# Patient Record
Sex: MALE | Race: BLACK OR AFRICAN AMERICAN | NOT HISPANIC OR LATINO | Employment: PART TIME | URBAN - METROPOLITAN AREA
[De-identification: names, ages, dates, MRNs, and addresses within clinical notes are randomized per-mention and may not be internally consistent; named-entity substitution may affect disease eponyms.]

---

## 2017-04-19 ENCOUNTER — TRANSFERRED RECORDS (OUTPATIENT)
Dept: HEALTH INFORMATION MANAGEMENT | Facility: CLINIC | Age: 47
End: 2017-04-19

## 2017-05-05 ENCOUNTER — TRANSFERRED RECORDS (OUTPATIENT)
Dept: HEALTH INFORMATION MANAGEMENT | Facility: CLINIC | Age: 47
End: 2017-05-05

## 2017-06-12 ENCOUNTER — TRANSFERRED RECORDS (OUTPATIENT)
Dept: HEALTH INFORMATION MANAGEMENT | Facility: CLINIC | Age: 47
End: 2017-06-12

## 2017-06-21 ENCOUNTER — TRANSFERRED RECORDS (OUTPATIENT)
Dept: HEALTH INFORMATION MANAGEMENT | Facility: CLINIC | Age: 47
End: 2017-06-21

## 2017-07-05 ENCOUNTER — TRANSFERRED RECORDS (OUTPATIENT)
Dept: HEALTH INFORMATION MANAGEMENT | Facility: CLINIC | Age: 47
End: 2017-07-05

## 2018-02-19 ENCOUNTER — CHARTING TRANS (OUTPATIENT)
Dept: OTHER | Age: 48
End: 2018-02-19

## 2018-02-19 ASSESSMENT — PAIN SCALES - GENERAL: PAINLEVEL_OUTOF10: 8

## 2018-04-11 ENCOUNTER — CHARTING TRANS (OUTPATIENT)
Dept: OTHER | Age: 48
End: 2018-04-11

## 2018-04-19 ENCOUNTER — IMAGING SERVICES (OUTPATIENT)
Dept: OTHER | Age: 48
End: 2018-04-19

## 2018-04-19 ENCOUNTER — LAB SERVICES (OUTPATIENT)
Dept: OTHER | Age: 48
End: 2018-04-19

## 2018-04-19 ENCOUNTER — CHARTING TRANS (OUTPATIENT)
Dept: OTHER | Age: 48
End: 2018-04-19

## 2018-04-19 LAB
ANALYZER ANC (IANC): ABNORMAL
ANALYZER ANC (IANC): ABNORMAL
ANION GAP SERPL CALC-SCNC: 14 MMOL/L (ref 10–20)
ANION GAP SERPL CALC-SCNC: 14 MMOL/L (ref 10–20)
BASOPHILS # BLD: 0.1 K/MCL (ref 0–0.3)
BASOPHILS # BLD: 0.1 THOUSAND/MCL (ref 0–0.3)
BASOPHILS NFR BLD: 0 %
BASOPHILS NFR BLD: 0 %
BUN SERPL-MCNC: 20 MG/DL (ref 6–20)
BUN SERPL-MCNC: 20 MG/DL (ref 6–20)
BUN/CREAT SERPL: 17 (ref 7–25)
BUN/CREAT SERPL: 17 (ref 7–25)
CALCIUM SERPL-MCNC: 9.6 MG/DL (ref 8.4–10.2)
CALCIUM SERPL-MCNC: 9.6 MG/DL (ref 8.4–10.2)
CHLORIDE SERPL-SCNC: 106 MMOL/L (ref 98–107)
CHLORIDE: 106 MMOL/L (ref 98–107)
CO2 SERPL-SCNC: 27 MMOL/L (ref 21–32)
CO2 SERPL-SCNC: 27 MMOL/L (ref 21–32)
CREAT SERPL-MCNC: 1.18 MG/DL (ref 0.67–1.17)
CREAT SERPL-MCNC: 1.18 MG/DL (ref 0.67–1.17)
D DIMER PPP FEU-MCNC: <0.19 MG/L (FEU)
D DIMER PPP FEU-MCNC: <0.19 MG/L FEU
DIFFERENTIAL METHOD BLD: ABNORMAL
DIFFERENTIAL METHOD BLD: ABNORMAL
EOSINOPHIL # BLD: 0.3 K/MCL (ref 0.1–0.5)
EOSINOPHIL # BLD: 0.3 THOUSAND/MCL (ref 0.1–0.5)
EOSINOPHIL NFR BLD: 2 %
EOSINOPHIL NFR BLD: 2 %
ERYTHROCYTE [DISTWIDTH] IN BLOOD: 14.6 % (ref 11–15)
ERYTHROCYTE [DISTWIDTH] IN BLOOD: 14.6 % (ref 11–15)
GLUCOSE SERPL-MCNC: 94 MG/DL (ref 65–99)
GLUCOSE SERPL-MCNC: 94 MG/DL (ref 65–99)
HEMATOCRIT: 49.3 % (ref 39–51)
HEMATOCRIT: 49.3 % (ref 39–51)
HEMOGLOBIN: 16.2 G/DL (ref 13–17)
HGB BLD-MCNC: 16.2 GM/DL (ref 13–17)
LYMPHOCYTES # BLD: 3.1 K/MCL (ref 1–4.8)
LYMPHOCYTES # BLD: 3.1 THOUSAND/MCL (ref 1–4.8)
LYMPHOCYTES NFR BLD: 23 %
LYMPHOCYTES NFR BLD: 23 %
MCH RBC QN AUTO: 28.4 PG (ref 26–34)
MCHC RBC AUTO-ENTMCNC: 32.9 GM/DL (ref 32–36.5)
MCV RBC AUTO: 86.3 FL (ref 78–100)
MEAN CORPUSCULAR HEMOGLOBIN: 28.4 PG (ref 26–34)
MEAN CORPUSCULAR HGB CONC: 32.9 G/DL (ref 32–36.5)
MEAN CORPUSCULAR VOLUME: 86.3 FL (ref 78–100)
MONOCYTES # BLD: 1.1 K/MCL (ref 0.3–0.9)
MONOCYTES # BLD: 1.1 THOUSAND/MCL (ref 0.3–0.9)
MONOCYTES NFR BLD: 8 %
MONOCYTES NFR BLD: 8 %
NEUTROPHILS # BLD: 9 K/MCL (ref 1.8–7.7)
NEUTROPHILS # BLD: 9 THOUSAND/MCL (ref 1.8–7.7)
NEUTROPHILS NFR BLD: 67 %
NEUTROPHILS NFR BLD: 67 %
NEUTS SEG NFR BLD: ABNORMAL
NEUTS SEG NFR BLD: ABNORMAL %
NRBC (NRBCRE): ABNORMAL
PERCENT NRBC: ABNORMAL
PLATELET # BLD: 295 THOUSAND/MCL (ref 140–450)
PLATELET COUNT: 295 K/MCL (ref 140–450)
POTASSIUM SERPL-SCNC: 4.7 MMOL/L (ref 3.4–5.1)
POTASSIUM SERPL-SCNC: 4.7 MMOL/L (ref 3.4–5.1)
RBC # BLD: 5.71 MILLION/MCL (ref 4.5–5.9)
RED CELL COUNT: 5.71 MIL/MCL (ref 4.5–5.9)
SODIUM SERPL-SCNC: 142 MMOL/L (ref 135–145)
SODIUM SERPL-SCNC: 142 MMOL/L (ref 135–145)
TROPONIN I SERPL HS-MCNC: <0.02 NG/ML
WBC # BLD: 13.5 THOUSAND/MCL (ref 4.2–11)
WHITE BLOOD COUNT: 13.5 K/MCL (ref 4.2–11)

## 2018-04-20 ENCOUNTER — HOSPITAL (OUTPATIENT)
Dept: OTHER | Age: 48
End: 2018-04-20
Attending: INTERNAL MEDICINE

## 2018-04-20 ENCOUNTER — IMAGING SERVICES (OUTPATIENT)
Dept: OTHER | Age: 48
End: 2018-04-20

## 2018-04-20 ENCOUNTER — DIAGNOSTIC TRANS (OUTPATIENT)
Dept: OTHER | Age: 48
End: 2018-04-20

## 2018-04-20 LAB
ALBUMIN SERPL-MCNC: 3.6 GM/DL (ref 3.6–5.1)
ALBUMIN/GLOB SERPL: 1 {RATIO} (ref 1–2.4)
ALP SERPL-CCNC: 60 UNIT/L (ref 45–117)
ALT SERPL-CCNC: 35 UNIT/L
ANALYZER ANC (IANC): ABNORMAL
ANION GAP SERPL CALC-SCNC: 16 MMOL/L (ref 10–20)
AST SERPL-CCNC: 13 UNIT/L
BASOPHILS # BLD: 0 THOUSAND/MCL (ref 0–0.3)
BASOPHILS NFR BLD: 0 %
BILIRUB SERPL-MCNC: 0.2 MG/DL (ref 0.2–1)
BUN SERPL-MCNC: 20 MG/DL (ref 6–20)
BUN/CREAT SERPL: 19 (ref 7–25)
CALCIUM SERPL-MCNC: 9.1 MG/DL (ref 8.4–10.2)
CHLORIDE: 105 MMOL/L (ref 98–107)
CO2 SERPL-SCNC: 24 MMOL/L (ref 21–32)
CREAT SERPL-MCNC: 1.03 MG/DL (ref 0.67–1.17)
DIFFERENTIAL METHOD BLD: ABNORMAL
EOSINOPHIL # BLD: 0 THOUSAND/MCL (ref 0.1–0.5)
EOSINOPHIL NFR BLD: 0 %
ERYTHROCYTE [DISTWIDTH] IN BLOOD: 14.7 % (ref 11–15)
GLOBULIN SER-MCNC: 3.5 GM/DL (ref 2–4)
GLUCOSE SERPL-MCNC: 221 MG/DL (ref 65–99)
HEMATOCRIT: 47 % (ref 39–51)
HGB BLD-MCNC: 15.3 GM/DL (ref 13–17)
LYMPHOCYTES # BLD: 0.7 THOUSAND/MCL (ref 1–4.8)
LYMPHOCYTES NFR BLD: 6 %
MCH RBC QN AUTO: 28 PG (ref 26–34)
MCHC RBC AUTO-ENTMCNC: 32.6 GM/DL (ref 32–36.5)
MCV RBC AUTO: 85.9 FL (ref 78–100)
MONOCYTES # BLD: 0.2 THOUSAND/MCL (ref 0.3–0.9)
MONOCYTES NFR BLD: 2 %
NEUTROPHILS # BLD: 10.2 THOUSAND/MCL (ref 1.8–7.7)
NEUTROPHILS NFR BLD: 92 %
NEUTS SEG NFR BLD: ABNORMAL %
PERCENT NRBC: ABNORMAL
PLATELET # BLD: 282 THOUSAND/MCL (ref 140–450)
POTASSIUM SERPL-SCNC: 4.2 MMOL/L (ref 3.4–5.1)
PROT SERPL-MCNC: 7.1 GM/DL (ref 6.4–8.2)
RBC # BLD: 5.47 MILLION/MCL (ref 4.5–5.9)
SODIUM SERPL-SCNC: 141 MMOL/L (ref 135–145)
TROPONIN I SERPL HS-MCNC: <0.02 NG/ML
WBC # BLD: 11.1 THOUSAND/MCL (ref 4.2–11)

## 2018-04-21 LAB
ANALYZER ANC (IANC): ABNORMAL
ANION GAP SERPL CALC-SCNC: 14 MMOL/L (ref 10–20)
BASOPHILS # BLD: 0 THOUSAND/MCL (ref 0–0.3)
BASOPHILS NFR BLD: 0 %
BUN SERPL-MCNC: 18 MG/DL (ref 6–20)
BUN/CREAT SERPL: 19 (ref 7–25)
CALCIUM SERPL-MCNC: 8.8 MG/DL (ref 8.4–10.2)
CHLORIDE: 106 MMOL/L (ref 98–107)
CO2 SERPL-SCNC: 24 MMOL/L (ref 21–32)
CREAT SERPL-MCNC: 0.95 MG/DL (ref 0.67–1.17)
DIFFERENTIAL METHOD BLD: ABNORMAL
EOSINOPHIL # BLD: 0 THOUSAND/MCL (ref 0.1–0.5)
EOSINOPHIL NFR BLD: 0 %
ERYTHROCYTE [DISTWIDTH] IN BLOOD: 14.7 % (ref 11–15)
GLUCOSE SERPL-MCNC: 288 MG/DL (ref 65–99)
HEMATOCRIT: 45.3 % (ref 39–51)
HGB BLD-MCNC: 15.1 GM/DL (ref 13–17)
LYMPHOCYTES # BLD: 0.8 THOUSAND/MCL (ref 1–4.8)
LYMPHOCYTES NFR BLD: 4 %
MCH RBC QN AUTO: 28.4 PG (ref 26–34)
MCHC RBC AUTO-ENTMCNC: 33.3 GM/DL (ref 32–36.5)
MCV RBC AUTO: 85.3 FL (ref 78–100)
MONOCYTES # BLD: 0.7 THOUSAND/MCL (ref 0.3–0.9)
MONOCYTES NFR BLD: 4 %
NEUTROPHILS # BLD: 17.8 THOUSAND/MCL (ref 1.8–7.7)
NEUTROPHILS NFR BLD: 92 %
NEUTS SEG NFR BLD: ABNORMAL %
PERCENT NRBC: ABNORMAL
PLATELET # BLD: 294 THOUSAND/MCL (ref 140–450)
POTASSIUM SERPL-SCNC: 4.3 MMOL/L (ref 3.4–5.1)
RBC # BLD: 5.31 MILLION/MCL (ref 4.5–5.9)
SODIUM SERPL-SCNC: 140 MMOL/L (ref 135–145)
WBC # BLD: 19.2 THOUSAND/MCL (ref 4.2–11)

## 2018-04-22 LAB
ANION GAP SERPL CALC-SCNC: 12 MMOL/L (ref 10–20)
BUN SERPL-MCNC: 17 MG/DL (ref 6–20)
BUN/CREAT SERPL: 19 (ref 7–25)
CALCIUM SERPL-MCNC: 8.9 MG/DL (ref 8.4–10.2)
CHLORIDE: 108 MMOL/L (ref 98–107)
CO2 SERPL-SCNC: 26 MMOL/L (ref 21–32)
CREAT SERPL-MCNC: 0.88 MG/DL (ref 0.67–1.17)
GLUCOSE BLDC GLUCOMTR-MCNC: 108 MG/DL (ref 65–99)
GLUCOSE BLDC GLUCOMTR-MCNC: 141 MG/DL (ref 65–99)
GLUCOSE BLDC GLUCOMTR-MCNC: 213 MG/DL (ref 65–99)
GLUCOSE SERPL-MCNC: 158 MG/DL (ref 65–99)
POTASSIUM SERPL-SCNC: 4.4 MMOL/L (ref 3.4–5.1)
SODIUM SERPL-SCNC: 142 MMOL/L (ref 135–145)

## 2018-05-24 ENCOUNTER — CHARTING TRANS (OUTPATIENT)
Dept: OTHER | Age: 48
End: 2018-05-24

## 2018-05-24 ASSESSMENT — PAIN SCALES - GENERAL: PAINLEVEL_OUTOF10: 7

## 2018-06-19 ENCOUNTER — CHARTING TRANS (OUTPATIENT)
Dept: OTHER | Age: 48
End: 2018-06-19

## 2018-06-19 ASSESSMENT — PAIN SCALES - GENERAL: PAINLEVEL_OUTOF10: 7

## 2018-07-31 ENCOUNTER — CHARTING TRANS (OUTPATIENT)
Dept: OTHER | Age: 48
End: 2018-07-31

## 2018-11-01 VITALS
BODY MASS INDEX: 41.04 KG/M2 | SYSTOLIC BLOOD PRESSURE: 126 MMHG | RESPIRATION RATE: 16 BRPM | WEIGHT: 261.46 LBS | TEMPERATURE: 97.6 F | HEART RATE: 66 BPM | DIASTOLIC BLOOD PRESSURE: 72 MMHG | HEIGHT: 67 IN | OXYGEN SATURATION: 96 %

## 2018-11-01 VITALS
TEMPERATURE: 98.1 F | DIASTOLIC BLOOD PRESSURE: 60 MMHG | SYSTOLIC BLOOD PRESSURE: 116 MMHG | WEIGHT: 259.48 LBS | HEART RATE: 70 BPM | RESPIRATION RATE: 16 BRPM | BODY MASS INDEX: 40.73 KG/M2 | HEIGHT: 67 IN

## 2018-11-01 VITALS
DIASTOLIC BLOOD PRESSURE: 64 MMHG | BODY MASS INDEX: 40.59 KG/M2 | SYSTOLIC BLOOD PRESSURE: 118 MMHG | TEMPERATURE: 97.8 F | RESPIRATION RATE: 16 BRPM | HEIGHT: 67 IN | HEART RATE: 74 BPM | WEIGHT: 258.6 LBS

## 2019-05-09 ENCOUNTER — TRANSFERRED RECORDS (OUTPATIENT)
Dept: HEALTH INFORMATION MANAGEMENT | Facility: CLINIC | Age: 49
End: 2019-05-09

## 2019-05-30 ENCOUNTER — DOCUMENTATION ONLY (OUTPATIENT)
Dept: CARE COORDINATION | Facility: CLINIC | Age: 49
End: 2019-05-30

## 2019-06-05 ENCOUNTER — PATIENT OUTREACH (OUTPATIENT)
Dept: CARE COORDINATION | Facility: CLINIC | Age: 49
End: 2019-06-05

## 2019-06-05 ENCOUNTER — OFFICE VISIT (OUTPATIENT)
Dept: INTERNAL MEDICINE | Facility: CLINIC | Age: 49
End: 2019-06-05

## 2019-06-05 VITALS
BODY MASS INDEX: 44.25 KG/M2 | SYSTOLIC BLOOD PRESSURE: 112 MMHG | OXYGEN SATURATION: 96 % | WEIGHT: 274 LBS | DIASTOLIC BLOOD PRESSURE: 77 MMHG | HEART RATE: 65 BPM

## 2019-06-05 DIAGNOSIS — M54.41 CHRONIC BILATERAL LOW BACK PAIN WITH RIGHT-SIDED SCIATICA: Primary | ICD-10-CM

## 2019-06-05 DIAGNOSIS — G89.29 CHRONIC BILATERAL LOW BACK PAIN WITH RIGHT-SIDED SCIATICA: Primary | ICD-10-CM

## 2019-06-05 DIAGNOSIS — M54.2 NECK PAIN: ICD-10-CM

## 2019-06-05 DIAGNOSIS — J41.0 SIMPLE CHRONIC BRONCHITIS (H): ICD-10-CM

## 2019-06-05 RX ORDER — ALBUTEROL SULFATE 90 UG/1
2 AEROSOL, METERED RESPIRATORY (INHALATION) EVERY 6 HOURS PRN
Qty: 8.5 G | Refills: 3 | Status: SHIPPED | OUTPATIENT
Start: 2019-06-05 | End: 2021-07-09

## 2019-06-05 RX ORDER — NAPROXEN 500 MG/1
500 TABLET ORAL 2 TIMES DAILY PRN
Qty: 60 TABLET | Refills: 1 | Status: SHIPPED | OUTPATIENT
Start: 2019-06-05 | End: 2020-03-02

## 2019-06-05 RX ORDER — ONDANSETRON 4 MG/1
TABLET, ORALLY DISINTEGRATING ORAL
COMMUNITY
Start: 2019-05-09 | End: 2019-11-20

## 2019-06-05 RX ORDER — ALBUTEROL SULFATE 1.25 MG/3ML
1.25 SOLUTION RESPIRATORY (INHALATION) EVERY 6 HOURS PRN
COMMUNITY
End: 2019-11-08

## 2019-06-05 RX ORDER — ALBUTEROL SULFATE 90 UG/1
2 AEROSOL, METERED RESPIRATORY (INHALATION)
COMMUNITY
Start: 2019-04-04 | End: 2019-06-05

## 2019-06-05 RX ORDER — ACETAMINOPHEN 500 MG
500-1000 TABLET ORAL EVERY 6 HOURS PRN
Qty: 90 TABLET | Refills: 1 | Status: SHIPPED | OUTPATIENT
Start: 2019-06-05 | End: 2020-03-17

## 2019-06-05 RX ORDER — FAMOTIDINE 20 MG/1
20 TABLET, FILM COATED ORAL
COMMUNITY
Start: 2019-05-09 | End: 2019-11-20

## 2019-06-05 ASSESSMENT — PAIN SCALES - GENERAL: PAINLEVEL: SEVERE PAIN (6)

## 2019-06-05 NOTE — PROGRESS NOTES
[facility]  Primary Care Center   JUNIOR Cabello CNP  06/05/2019      Chief Complaint:   No chief complaint on file.     History of Present Illness:   Bill Wisdom is a 49 year old male with a history of esophageal reflux who presents for establishment of primary care.    Other concerns discussed:  ***     Review of Systems:   Pertinent items are noted in HPI, remainder of complete ROS is negative.      Active Medications:     Current Outpatient Medications:      CRANBERRY, , Disp: , Rfl:      FISH OIL, , Disp: , Rfl:      LANsoprazole (PREVACID) 15 MG capsule, Take 1 capsule by mouth daily., Disp: 30 capsule, Rfl: 3     MILK THISTLE, , Disp: , Rfl:       Allergies:   Patient has no known allergies.      Past Medical History:  Low back pain  Spermatocele  Obesity  Esophageal reflux  Family history of diabetes     Past Surgical History:  Scrotal hydrocelectomy  Spermatoceletomy    Family History:   Mother - coronary artery disease with triple bypass  Father - hypertension, cerebrovascular disease  Sister - alcohol/drug abuse     Social History:   Presents to clinic ***  Tobacco Use: Former smoker only while drinking  Alcohol Use: No alcohol use.   PCP: Physician No Ref-Primary      Physical Exam:   There were no vitals taken for this visit.   ***      Assessment and Plan:  There are no diagnoses linked to this encounter.     Follow-up: No follow-ups on file.         Scribe Disclosure:  Mae LEIGH, am serving as a scribe to document services personally performed by JUNIOR Cabello CNP at this visit, based upon the provider's statements to me. All documentation has been reviewed by the aforementioned provider prior to being entered into the official medical record.    Scribe Preparation Attestation:  Mae LEIGH, a scribe, prepared the chart for today's encounter. ***     Portions of this medical record were completed by a scribe. UPON MY REVIEW AND AUTHENTICATION BY ELECTRONIC  SIGNATURE, this confirms (a) I performed the applicable clinical services, and (b) the record is accurate.

## 2019-06-05 NOTE — NURSING NOTE
Chief Complaint   Patient presents with     Establish Care     Pt is here establish a new PCP.      Dolly Corrigan LPN at 11:29 AM on 6/5/2019.

## 2019-06-05 NOTE — PROGRESS NOTES
"[facility]  Primary Care [place]   Amalia CORRALBro Hernandez, JUNIOR ROJAS  06/05/2019      Chief Complaint: Establish Care     History of Present Illness:   Bill Wisdom is a 49 year old male with a history of CHANDLER on CPAP and COPD who presents to hospitals with primary care. He is currently living at the [facility]. He is working with [employer] and the shelter regarding finding new housing, and has a form for work restrictions. He moved here from [place] and was previously being seen at the [facility].     COPD:  The patient endorses a history of COPD for which he has used nebs in the past. He does not currently have an inhaler. He reports intermittent wheezing and coughing, especially at night. He is not currently using his nebs or CPAP due to not wanting to keep his machines at the shelter, so his nebulizer equipment and CPAP machine are at his daughter's house. He denies current shortness of breath. He reports he is a former smoker.    Back pain:  The patient reports a history of neck pain radiating down his right arm. He also has lower back pain with occasional right-sided sciatica. He states his low back pain is worse than his neck. He requests to be connected with a pain specialist for continued C5-C6 and lower back injections. He requests Ibuprofen for pain, as this has been helpful in the past. He reports another pain medication that has been helpful his old doctor called a \"fancy Ibuprofen.\" He states Naproxen sounds right. His pain is exacerbated by sitting or standing for too long. He reports having completed some physical therapy in the past.     Spermatocele/hydrocele:  The patient reports a past surgery back in 2012 for a spermatocele/hydrocele. He reports that this has recurred in his left testicle. He describes this as fullness that is pushing on one side and it is uncomfortable. He is urinating and having bowel movements without difficulty.     Mental health:  The patient reports " intermittent depression, hopelessness, and anxiety, especially regarding his current living situation. He is working with [facility]. He denies known history of bipolar or schizophrenia.     Other concerns discussed:   The patient is not currently working. He worked in construction in the past. He notes being overweight and a prediabetic. He is trying to avoid fried foods. He plans to get a weight loss plan put together once he gets settled.      Review of Systems:   Pertinent items are noted in HPI, remainder of complete ROS is negative.      Active Medications:      albuterol (ACCUNEB) 1.25 MG/3ML neb solution, Take 1.25 mg by nebulization every 6 hours as needed for shortness of breath / dyspnea or wheezing, Disp: , Rfl:      albuterol (PROVENTIL HFA) 108 (90 Base) MCG/ACT inhaler, Inhale 2 puffs into the lungs, Disp: , Rfl:      CRANBERRY, , Disp: , Rfl:      famotidine (PEPCID) 20 MG tablet, Take 20 mg by mouth, Disp: , Rfl:      FISH OIL, , Disp: , Rfl:      MILK THISTLE, , Disp: , Rfl:      ondansetron (ZOFRAN ODT) 4 MG ODT tab, 1-2 tab dissolve on tongue 4 times daily as needed for nausea, max daily dose 4 tab, Disp: , Rfl:       Allergies:   Patient has no known allergies.      Past Medical History:  Family history of arteriosclerotic cardiovascular disease  Family history of diabetes mellitus  Low back pain  Spermatocele  Obesity  Esophageal reflux     Past Surgical History:  Hydrocelectomy scrotal  Spermatocelectomy    Family History:   Mother - coronary artery disease, triple bypass  Father - hypertension, cerebrovascular disease  Sister - alcohol/drug disease     Social History:   Presents to clinic alone.   Tobacco Use: Former smoker, quit 90 days ago  Alcohol Use: No alcohol use. Quit 90 days ago.   The patient recently moved back to [place] from [place]. The patient has 4 daughter and a son. Three of his daughters live in the [facility] [place] area. One daughter and son live in [place]. He  has 8 grandchildren. He is currently unemployed.      Physical Exam:   /77   Pulse 65   Wt 124.3 kg (274 lb)   SpO2 96%   BMI 44.25 kg/m       Constitutional: Alert, oriented, pleasant, no acute distress  Head: Normocephalic, atraumatic  Eyes: Extra-ocular movements intact, pupils equally round and reactive bilaterally, no scleral icterus  ENT: Oropharynx clear, moist mucus membranes, good dentition  Neck: Supple, no lymphadenopathy, no thyromegaly  Cardiovascular: Regular rate and rhythm, no murmurs, rubs or gallops  Respiratory: Good air movement bilaterally, lungs clear, no wheezes/rales/rhonchi  GI: Abdomen soft, bowel sounds present, nondistended, nontender, no organomegaly or masses, no rebound/guarding  Musculoskeletal: No edema, normal muscle tone, normal gait, tenderness over C7-T1 and lumbar spine, tender right lumbar paraspinal muscles, pain elicited wit spinal rotation to the left  Neurologic: Alert and oriented, cranial nerves grossly intact, strength 5/5 throughout, sensation to light touch intact, reflexes 2+ bilaterally  Psychiatric: normal mentation, affect and mood      Assessment and Plan:  Simple chronic bronchitis - Normal breathing on exam. Endorses history of COPD and CHANDLER. Not currently using neb or CPAP due to fear of having his machines in the shelter. He is working with housing assistance to find a place of his own. Refilled albuterol inhaler in the meantime, encouraged him to keep this in his pocket/with personal belongings to avoid having it stolen.    - albuterol (PROVENTIL HFA) 108 (90 Base) MCG/ACT inhaler  Dispense: 8.5 g; Refill: 3    Neck pain - Chronic. Reports he has had neck and low back injections in the past. Requests to see the pain clinic for further pain management. Started on Tylenol and Naproxen prn. Will request records for prior imaging studies.  - [facility] REFERRAL  - acetaminophen (TYLENOL) 500 MG tablet  Dispense: 90 tablet;  Refill: 1  - naproxen (NAPROSYN) 500 MG tablet  Dispense: 60 tablet; Refill: 1    Chronic bilateral low back pain with right-sided sciatica - See above.   - [facility] REFERRAL  - acetaminophen (TYLENOL) 500 MG tablet  Dispense: 90 tablet; Refill: 1  - naproxen (NAPROSYN) 500 MG tablet  Dispense: 60 tablet; Refill: 1    Follow-up: Return in about 1 month (around 7/3/2019). Discussed can review his form at that time.        Scribe Disclosure:  I, Mae Garcia, am serving as a scribe to document services personally performed by JUNIOR Cabello CNP at this visit, based upon the provider's statements to me. All documentation has been reviewed by the aforementioned provider prior to being entered into the official medical record.    Portions of this medical record were completed by a scribe. UPON MY REVIEW AND AUTHENTICATION BY ELECTRONIC SIGNATURE, this confirms (a) I performed the applicable clinical services, and (b) the record is accurate.     JUNIOR Cabello CNP

## 2019-06-05 NOTE — PATIENT INSTRUCTIONS
[facility] Medication Refill Request Information:  * Please contact your pharmacy regarding ANY request for medication refills.  ** [facility] Prescription Fax = [phone]  * Please allow 3 business days for routine medication refills.  * Please allow 5 business days for controlled substance medication refills.     [facility] Test Result notification information:  *You will be notified with in 7-10 days of your appointment day regarding the results of your test.  If you are on MyChart you will be notified as soon as the provider has reviewed the results and signed off on them.    [facility]: [phone]

## 2019-06-10 ENCOUNTER — MEDICAL CORRESPONDENCE (OUTPATIENT)
Dept: HEALTH INFORMATION MANAGEMENT | Facility: CLINIC | Age: 49
End: 2019-06-10

## 2019-06-12 ENCOUNTER — TELEPHONE (OUTPATIENT)
Dept: INTERNAL MEDICINE | Facility: CLINIC | Age: 49
End: 2019-06-12

## 2019-06-12 NOTE — TELEPHONE ENCOUNTER
[facility] Call Center    Phone Message    May a detailed message be left on voicemail: yes    Reason for Call: pt needs name and number of the  so that he can get give that name to his case management for [employer] So that they can all work together to help the pt. Pt saw Amalia on June 5, 2019.  Action Taken: Message routed to:  [facility] ([facility] primary care

## 2019-06-12 NOTE — TELEPHONE ENCOUNTER
Pt looking for social workers name and number he spoke with at visit. I informed pt of the clinic social workers contact information via a voicemail [phone] per their request. Miroslava Love CMA at 10:38 AM on 6/12/2019

## 2019-06-13 DIAGNOSIS — M54.2 NECK PAIN: ICD-10-CM

## 2019-06-13 DIAGNOSIS — G89.29 CHRONIC BILATERAL LOW BACK PAIN WITH RIGHT-SIDED SCIATICA: ICD-10-CM

## 2019-06-13 DIAGNOSIS — M54.41 CHRONIC BILATERAL LOW BACK PAIN WITH RIGHT-SIDED SCIATICA: ICD-10-CM

## 2019-06-13 NOTE — PROGRESS NOTES
Records from [facility] received and reviewed. Derek is prescribed Trazodone 100-200 mg at bedtime prn, Fluoxetine 20 mg daily, and Lorazepam 0.5 mg BID prn. All were prescribed on 5/22/19.

## 2019-06-26 NOTE — PROGRESS NOTES
Social Work Intervention  [facility]    Data/Intervention:    Patient Name:  Bill Wisdom  /Age:  1970 (49 year old)    Visit Type: in person  Referral Source: PCC  Reason for Referral:  Housing resources    Collaborated With:    -Patient    Patient Concerns/Issues:   Patient reported he is currently homeless and stays at [facility] in [facility]. Patient reported he currently has an income through [employer]. Patient reported he has had intakes at [employer], [employer], [employer], and [facility].     Intervention/Education/Resources Provided:   provided supportive listening and validation.  encouraged Patient to continue to work with the programs he has done intakes with.  also showed Patient, on the computer, how to find openings using [employer]. Patient asked  to email him the link for [employer] to [email or web address].  sent email.    Assessment/Plan:  Patient to continue to pursue housing through agencies he has already completed intakes with.  emailed [employer] information to Patient to use at his leisure.    Provided patient/family with contact information and availability.    Mae Bravo [facility]  Outpatient Specialty Clinics  Direct Phone: [phone]  Pager:  [phone]

## 2019-07-22 ENCOUNTER — HOSPITAL ENCOUNTER (EMERGENCY)
Facility: CLINIC | Age: 49
Discharge: HOME | End: 2019-07-22
Attending: FAMILY MEDICINE | Admitting: FAMILY MEDICINE
Payer: COMMERCIAL

## 2019-07-22 ENCOUNTER — APPOINTMENT (OUTPATIENT)
Dept: GENERAL RADIOLOGY | Facility: CLINIC | Age: 49
End: 2019-07-22
Attending: FAMILY MEDICINE
Payer: COMMERCIAL

## 2019-07-22 VITALS
RESPIRATION RATE: 16 BRPM | TEMPERATURE: 97.9 F | DIASTOLIC BLOOD PRESSURE: 77 MMHG | SYSTOLIC BLOOD PRESSURE: 121 MMHG | HEART RATE: 63 BPM | OXYGEN SATURATION: 100 %

## 2019-07-22 DIAGNOSIS — M10.071 ACUTE IDIOPATHIC GOUT INVOLVING TOE OF RIGHT FOOT: ICD-10-CM

## 2019-07-22 LAB
ANION GAP SERPL CALCULATED.3IONS-SCNC: 5 MMOL/L (ref 3–14)
BASOPHILS # BLD AUTO: 0 10E9/L (ref 0–0.2)
BASOPHILS NFR BLD AUTO: 0.2 %
BUN SERPL-MCNC: 12 MG/DL (ref 7–30)
CALCIUM SERPL-MCNC: 8.9 MG/DL (ref 8.5–10.1)
CHLORIDE SERPL-SCNC: 110 MMOL/L (ref 94–109)
CO2 SERPL-SCNC: 29 MMOL/L (ref 20–32)
CREAT SERPL-MCNC: 0.85 MG/DL (ref 0.66–1.25)
DIFFERENTIAL METHOD BLD: ABNORMAL
EOSINOPHIL # BLD AUTO: 0.4 10E9/L (ref 0–0.7)
EOSINOPHIL NFR BLD AUTO: 3.2 %
ERYTHROCYTE [DISTWIDTH] IN BLOOD BY AUTOMATED COUNT: 14 % (ref 10–15)
GFR SERPL CREATININE-BSD FRML MDRD: >90 ML/MIN/{1.73_M2}
GLUCOSE SERPL-MCNC: 88 MG/DL (ref 70–99)
HCT VFR BLD AUTO: 42.9 % (ref 40–53)
HGB BLD-MCNC: 13.6 G/DL (ref 13.3–17.7)
IMM GRANULOCYTES # BLD: 0.1 10E9/L (ref 0–0.4)
IMM GRANULOCYTES NFR BLD: 0.4 %
LYMPHOCYTES # BLD AUTO: 2.1 10E9/L (ref 0.8–5.3)
LYMPHOCYTES NFR BLD AUTO: 18.6 %
MCH RBC QN AUTO: 27.2 PG (ref 26.5–33)
MCHC RBC AUTO-ENTMCNC: 31.7 G/DL (ref 31.5–36.5)
MCV RBC AUTO: 86 FL (ref 78–100)
MONOCYTES # BLD AUTO: 1.1 10E9/L (ref 0–1.3)
MONOCYTES NFR BLD AUTO: 10 %
NEUTROPHILS # BLD AUTO: 7.6 10E9/L (ref 1.6–8.3)
NEUTROPHILS NFR BLD AUTO: 67.6 %
NRBC # BLD AUTO: 0 10*3/UL
NRBC BLD AUTO-RTO: 0 /100
PLATELET # BLD AUTO: 250 10E9/L (ref 150–450)
POTASSIUM SERPL-SCNC: 4.1 MMOL/L (ref 3.4–5.3)
RBC # BLD AUTO: 5 10E12/L (ref 4.4–5.9)
SODIUM SERPL-SCNC: 144 MMOL/L (ref 133–144)
URATE SERPL-MCNC: 7.5 MG/DL (ref 3.5–7.2)
WBC # BLD AUTO: 11.2 10E9/L (ref 4–11)

## 2019-07-22 PROCEDURE — 80048 BASIC METABOLIC PNL TOTAL CA: CPT | Performed by: FAMILY MEDICINE

## 2019-07-22 PROCEDURE — 73660 X-RAY EXAM OF TOE(S): CPT | Mod: RT

## 2019-07-22 PROCEDURE — 84550 ASSAY OF BLOOD/URIC ACID: CPT | Performed by: FAMILY MEDICINE

## 2019-07-22 PROCEDURE — 99284 EMERGENCY DEPT VISIT MOD MDM: CPT | Mod: Z6 | Performed by: FAMILY MEDICINE

## 2019-07-22 PROCEDURE — 85025 COMPLETE CBC W/AUTO DIFF WBC: CPT | Performed by: FAMILY MEDICINE

## 2019-07-22 PROCEDURE — 99284 EMERGENCY DEPT VISIT MOD MDM: CPT | Performed by: FAMILY MEDICINE

## 2019-07-22 PROCEDURE — 25000132 ZZH RX MED GY IP 250 OP 250 PS 637: Performed by: FAMILY MEDICINE

## 2019-07-22 PROCEDURE — 36415 COLL VENOUS BLD VENIPUNCTURE: CPT | Performed by: FAMILY MEDICINE

## 2019-07-22 RX ORDER — IBUPROFEN 600 MG/1
600 TABLET, FILM COATED ORAL ONCE
Status: COMPLETED | OUTPATIENT
Start: 2019-07-22 | End: 2019-07-22

## 2019-07-22 RX ORDER — INDOMETHACIN 50 MG/1
50 CAPSULE ORAL 3 TIMES DAILY
Qty: 20 CAPSULE | Refills: 0 | Status: SHIPPED | OUTPATIENT
Start: 2019-07-22 | End: 2019-09-16

## 2019-07-22 RX ADMIN — IBUPROFEN 600 MG: 600 TABLET ORAL at 14:11

## 2019-07-22 ASSESSMENT — ENCOUNTER SYMPTOMS
BACK PAIN: 1
ABDOMINAL PAIN: 0
SHORTNESS OF BREATH: 0
FEVER: 0

## 2019-07-22 NOTE — ED PROVIDER NOTES
"    [facility] EMERGENCY DEPARTMENT ([facility])    7/22/19       History     Chief Complaint   Patient presents with     Toe Pain     R great toe pain and swelling for past few weeks, feels like toe is \"turning dark\"     The history is provided by the patient.     Bill Wisdom is a 49 year old male with a medical history significant for COPD and CHANDLER (on CPAP) who presents to the Emergency Department for evaluation of right great toe pain and swelling.  The patient reports that he first began having pain a week ago.  He has tried different kinds of socks and even bought new shoes, but he has not had improvement of the pain.  He reports that the pain starts in his right great toe and does radiate into the foot and into the ankle.  He has began noticing swelling in the right great toe as well.  He does not believe that he has had any traumas or injuries to the toe.  He does report that he is doing work with [employer] and he is on his feet a lot of the day.  The patient denies any alcohol use.  He denies any history of gout.  The patient does note a history of chronic back pain for which he receives steroid injections, but he denies any change in this back pain and denies any new back pain today.    I have reviewed the Medications, Allergies, Past Medical and Surgical History, and Social History in the [facility] system.    Past Medical History:   Diagnosis Date     COPD (chronic obstructive pulmonary disease) (H)      Gastro-oesophageal reflux disease      History of tobacco abuse      Prediabetes        Past Surgical History:   Procedure Laterality Date     HYDROCELECTOMY SCROTAL  2006     SPERMATOCELECTOMY  3/20/2012    Procedure:SPERMATOCELECTOMY; Left Spermatocelectomy; Surgeon:DELLA CROW; Location:[facility] OR       Family History   Problem Relation Age of Onset     C.A.D. Mother         triple bypass     Hypertension Father      Cerebrovascular Disease Father      Alcohol/Drug Sister        Social History "     Tobacco Use     Smoking status: Former Smoker     Types: Cigarettes     Smokeless tobacco: Never Used   Substance Use Topics     Alcohol use: No       No current facility-administered medications for this encounter.      Current Outpatient Medications   Medication     indomethacin (INDOCIN) 50 MG capsule     acetaminophen (TYLENOL) 500 MG tablet     albuterol (ACCUNEB) 1.25 MG/3ML neb solution     albuterol (PROVENTIL HFA) 108 (90 Base) MCG/ACT inhaler     CRANBERRY     famotidine (PEPCID) 20 MG tablet     FISH OIL     MILK THISTLE     naproxen (NAPROSYN) 500 MG tablet     ondansetron (ZOFRAN ODT) 4 MG ODT tab      No Known Allergies      Review of Systems   Constitutional: Negative for fever.   Respiratory: Negative for shortness of breath.    Cardiovascular: Negative for chest pain.   Gastrointestinal: Negative for abdominal pain.   Musculoskeletal: Positive for back pain (chronic; unchanged).        Positive for right great toe pain with radiation into the foot/ankle  Positive for right great toe swelling   All other systems reviewed and are negative.      Physical Exam   BP: 124/66  Pulse: 61  Temp: 97  F (36.1  C)  Resp: 16  SpO2: 95 %      Physical Exam   Constitutional: No distress.   HENT:   Head: Atraumatic.   Mouth/Throat: Oropharynx is clear and moist.   Eyes: Pupils are equal, round, and reactive to light. No scleral icterus.   Cardiovascular: Normal heart sounds and intact distal pulses.   Pulmonary/Chest: Breath sounds normal. No respiratory distress.   Abdominal: Soft. Bowel sounds are normal. There is no tenderness.   Musculoskeletal: He exhibits no edema.        Right foot: There is decreased range of motion, tenderness and swelling.        Feet:    No evidence of trauma and pedal pulses normal.  Capillary refill normal in the toe.   Skin: Skin is warm. No rash noted. He is not diaphoretic.       ED Course   2:00 PM  The patient was seen and examined by Nathan Valdes MD in Room ED20.         Procedures             Critical Care time:  none             Labs Ordered and Resulted from Time of ED Arrival Up to the Time of Departure from the ED   CBC WITH PLATELETS DIFFERENTIAL - Abnormal; Notable for the following components:       Result Value    WBC 11.2 (*)     All other components within normal limits   BASIC METABOLIC PANEL - Abnormal; Notable for the following components:    Chloride 110 (*)     All other components within normal limits   URIC ACID - Abnormal; Notable for the following components:    Uric Acid 7.5 (*)     All other components within normal limits            Assessments & Plan (with Medical Decision Making)   Patient presented with nontraumatic swelling and pain of the right great toe IP and MTP joints.  Differential diagnosis includes septic joint, gout, pseudogout, autoimmune inflammatory arthritis such as rheumatoid arthritis, hemarthrosis, inflammatory effusion or pain due to osteoarthritis, viral synovitis, bursitis, tendinitis.  On exam vitals normal.  There is warmth and tenderness of the joints but no definite effusion or cellulitis.  There is no sign of septic joint.  Neurovascular examination of the foot normal.  X-ray unremarkable.  Labs reveal elevated uric acid otherwise normal.  Clinically this scenario is most consistent with gout.  Will treat for acute gout with close outpatient follow-up.  We discussed the indications for emergency department return and follow-up.  Stable for discharge.      I have reviewed the nursing notes.    I have reviewed the findings, diagnosis, plan and need for follow up with the patient.       Medication List      Started    indomethacin 50 MG capsule  Commonly known as:  INDOCIN  50 mg, Oral, 3 TIMES DAILY, 1 cap three times daily for 3 days, then 1 cap two times daily for two days, them 1 cap daily for 2 day, then stop            Final diagnoses:   Acute idiopathic gout involving toe of right foot     IJustin, am serving as a  trained medical scribe to document services personally performed by Nathan Valdes MD, based on the provider's statements to me.   I, Nathan Valdes MD, was physically present and have reviewed and verified the accuracy of this note documented by Justin Bullard.    7/22/2019   [facility] [place], EMERGENCY DEPARTMENT     Nathan Valdes MD  07/22/19 [id number]

## 2019-07-22 NOTE — ED AVS SNAPSHOT
[facility], [place], Emergency Department  [address]  [facility] [place] [address]  Phone:  [phone]  Fax:  [phone]                                    Bill Wisdom   MRN: [id number]    Department:  [facility], Emergency Department   Date of Visit:  7/22/2019           After Visit Summary Signature Page    I have received my discharge instructions, and my questions have been answered. I have discussed any challenges I see with this plan with the nurse or doctor.    ..........................................................................................................................................  Patient/Patient Representative Signature      ..........................................................................................................................................  Patient Representative Print Name and Relationship to Patient    ..................................................               ................................................  Date                                   Time    ..........................................................................................................................................  Reviewed by Signature/Title    ...................................................              ..............................................  Date                                               Time          22EPIC Rev 08/18

## 2019-07-22 NOTE — DISCHARGE INSTRUCTIONS
Thank you for choosing [facility].     Please closely monitor for further symptoms. Return to the Emergency Department if you develop any new or worsening signs or symptoms.    If you received any opiate pain medications or sedatives during your visit, please do not drive for at least 8 hours.     Labs, cultures or final xray interpretations may still need to be reviewed.  We will call you if your plan of care needs to be changed.    Please follow up with your primary care physician or clinic in 7 to 10 days for recheck.

## 2019-07-22 NOTE — ED NOTES
Patient alert/oriented, stable on feet. VSS on RA. AVS reviewed, prescription provided for indocin. Patient safe to discharge home.

## 2019-08-05 NOTE — PROGRESS NOTES
I had the pleasure of meeting Mr. Bill Wisdom on 8/6/2019 in [facility] in consult for Dr. Hernandez with regards to his ***  Subjective:  The patient is a 49 year old male with past medical history of GERD, CHANDLER, COPD, chronic neck and back pain who presents for evaluation of ***.  He reports pain in his ***.  The patient's pain began *** following *** and has been *** since that time.  He reports that his pain is located primarily in *** and radiates to the ***.  The patient describes the pain as ***.  He reports that the pain is made worse by ***.  His pain is improved with ***.  In addition, he reports *** associated with the pain.  ***He denies any new problems with falls or balance, any new numbness or weakness of the arms or legs, any new bowel or bladder incontinence, or any sudden or unexpected weight loss.  The patient's pain is most severe during ***.  He rates his average pain score at ***/10, but it can be as low as ***/10 or as severe as ***/10.   He has previously tried *** which reduced the pain.  He has also tried *** which did not provide significant relief of pain.    Bill Wisdom {[id number]} been seen at a pain clinic in the past.  ***    Location:  Quality:  Severity:  Timing:  Duration:  Context:  Modifying Factors:  Associated Signs/Symptoms:    Current treatments include:  ***    Previous medication treatments included:  Anti-convulsants: ***  Muscle relaxors: ***  Anti-depressants: ***  NSAIDs: ***  Acetaminophen: ***  Topicals: ***  Headache abortives: ***  Headache prophylactics: ***  Opioids: ***    Other treatments have included:  Physical therapy: ***  Occupational therapy: ***  Chiropractic: ***  Acupuncture: ***  TENs Unit: ***  Injections: ***  Surgeries: ***    Past Medical History:  Past medical history reviewed with patient. ***  Past Medical History:   Diagnosis Date     COPD (chronic obstructive pulmonary disease) (H)      Gastro-oesophageal reflux disease       History of tobacco abuse      Prediabetes       Patient Active Problem List   Diagnosis     CARDIOVASCULAR SCREENING; LDL GOAL LESS THAN 160     Family history of arteriosclerotic cardiovascular disease     Family history of diabetes mellitus     Chronic bilateral low back pain with right-sided sciatica     Spermatocele     Obesity     Esophageal reflux     Simple chronic bronchitis (H)     Neck pain       Past Surgical History:  past surgical history reviewed with patient. ***  Past Surgical History:   Procedure Laterality Date     HYDROCELECTOMY SCROTAL  2006     SPERMATOCELECTOMY  3/20/2012    Procedure:SPERMATOCELECTOMY; Left Spermatocelectomy; Surgeon:DELLA CROW; Location:[facility]      Past Surgical History:   Procedure Laterality Date     HYDROCELECTOMY SCROTAL  2006     SPERMATOCELECTOMY  3/20/2012    Procedure:SPERMATOCELECTOMY; Left Spermatocelectomy; Surgeon:DELLA CROW; Location:[facility]       Medications: ***  Current Outpatient Medications   Medication     acetaminophen (TYLENOL) 500 MG tablet     albuterol (ACCUNEB) 1.25 MG/3ML neb solution     albuterol (PROVENTIL HFA) 108 (90 Base) MCG/ACT inhaler     CRANBERRY     famotidine (PEPCID) 20 MG tablet     FISH OIL     indomethacin (INDOCIN) 50 MG capsule     MILK THISTLE     naproxen (NAPROSYN) 500 MG tablet     ondansetron (ZOFRAN ODT) 4 MG ODT tab     No current facility-administered medications for this visit.        [place] and [place] Prescription Monitoring Program reviewed ***    Allergies:   No Known Allergies    Family History:  family history includes Alcohol/Drug in his sister; C.A.D. in his mother; Cerebrovascular Disease in his father; Hypertension in his father.    Social history:   Social History     Socioeconomic History     Marital status: Single     Spouse name: Not on file     Number of children: Not on file     Years of education: Not on file     Highest education level: Not on file   Occupational History     Not on file   Social Needs      Financial resource strain: Not on file     Food insecurity:     Worry: Not on file     Inability: Not on file     Transportation needs:     Medical: Not on file     Non-medical: Not on file   Tobacco Use     Smoking status: Former Smoker     Types: Cigarettes     Smokeless tobacco: Never Used   Substance and Sexual Activity     Alcohol use: No     Drug use: No     Sexual activity: Not Currently     Partners: Female     Comment: Past history of cocaine use   Lifestyle     Physical activity:     Days per week: Not on file     Minutes per session: Not on file     Stress: Not on file   Relationships     Social connections:     Talks on phone: Not on file     Gets together: Not on file     Attends Christianity service: Not on file     Active member of club or organization: Not on file     Attends meetings of clubs or organizations: Not on file     Relationship status: Not on file     Intimate partner violence:     Fear of current or ex partner: Not on file     Emotionally abused: Not on file     Physically abused: Not on file     Forced sexual activity: Not on file   Other Topics Concern     Parent/sibling w/ CABG, MI or angioplasty before 65F 55M? No   Social History Narrative     Not on file     he lives in ***. he {IS/NOT:[id number]} currently working. He work***.  Smoking: ***. Alcohol: ***. Street drugs: ***.     Review of Systems:  The 14 system ROS was reviewed from the intake questionnaire; results listed at end of note.    Physical Exam:  There were no vitals taken for this visit.  There is no height or weight on file to calculate BMI.  Physical Exam   Constitutional: he is oriented to person, place, and time.  he appears well-developed and well-nourished. No distress.   HENT:   Head: Normocephalic and atraumatic.   Eyes: Pupils are equal, round, and reactive to light. EOM are normal. No scleral icterus.   Neck: Normal range of motion. Neck supple.   Cardiovascular: Normal rate and regular rhythm.   Pulmonary/Chest:  Effort normal and breath sounds normal. No respiratory distress.   Abdominal: deferred  Genitourinary: deferred  Neuromuscular: See below  Neurological: he is alert and oriented to person, place, and time. CN II-XII grossly intact, coordination grossly normal.  Romberg test negative.  Skin: Skin is warm and dry. he is not diaphoretic.   Psychiatric: he has a normal mood and affect. his behavior is normal. Judgment and thought content normal.    Neuromuscular: ***    Imaging:  None available    EMG:  ***    Laboratory Results:  ***    Assessment:    The patient is a 49 year old male with PMHx of GERD, CHANDLER, COPD, and chronic neck and back pain who was referred by Dr. Hernandez for evaluation of his chronic cervical and lumbar axial pain.    Plan:  1) ***:    Work up:    -***  Medications:    -***  Therapies:    -***  Interventions:    -***    2) ***:    Work up:    -***  Medications:    -***  Therapies:    -***  Interventions:    -***    3) ***:    Work up:    -***  Medications:    -***  Therapies:    -***  Interventions:    -***    Follow up: ***    Thank you for the consult.    Sina Rojas MD    Department of Anesthesiology  Pain Management Division

## 2019-08-06 ENCOUNTER — TELEPHONE (OUTPATIENT)
Dept: ANESTHESIOLOGY | Facility: CLINIC | Age: 49
End: 2019-08-06

## 2019-08-06 ENCOUNTER — OFFICE VISIT (OUTPATIENT)
Dept: ANESTHESIOLOGY | Facility: CLINIC | Age: 49
End: 2019-08-06
Attending: NURSE PRACTITIONER

## 2019-08-06 ENCOUNTER — HOSPITAL ENCOUNTER (OUTPATIENT)
Facility: AMBULATORY SURGERY CENTER | Age: 49
End: 2019-08-06
Attending: ANESTHESIOLOGY

## 2019-08-06 ENCOUNTER — PATIENT OUTREACH (OUTPATIENT)
Dept: CARE COORDINATION | Facility: CLINIC | Age: 49
End: 2019-08-06

## 2019-08-06 VITALS
DIASTOLIC BLOOD PRESSURE: 71 MMHG | HEART RATE: 87 BPM | RESPIRATION RATE: 16 BRPM | HEIGHT: 67 IN | BODY MASS INDEX: 43.56 KG/M2 | SYSTOLIC BLOOD PRESSURE: 115 MMHG

## 2019-08-06 DIAGNOSIS — M53.3 SACROILIAC JOINT DYSFUNCTION OF RIGHT SIDE: ICD-10-CM

## 2019-08-06 DIAGNOSIS — M54.41 CHRONIC BILATERAL LOW BACK PAIN WITH RIGHT-SIDED SCIATICA: Primary | ICD-10-CM

## 2019-08-06 DIAGNOSIS — G89.29 CHRONIC BILATERAL LOW BACK PAIN WITH RIGHT-SIDED SCIATICA: Primary | ICD-10-CM

## 2019-08-06 RX ORDER — DULOXETIN HYDROCHLORIDE 30 MG/1
30 CAPSULE, DELAYED RELEASE ORAL DAILY
Qty: 30 CAPSULE | Refills: 1 | Status: SHIPPED | OUTPATIENT
Start: 2019-08-06 | End: 2019-11-20

## 2019-08-06 RX ORDER — TIZANIDINE HYDROCHLORIDE 4 MG/1
4 CAPSULE, GELATIN COATED ORAL 2 TIMES DAILY PRN
Qty: 30 CAPSULE | Refills: 1 | Status: SHIPPED | OUTPATIENT
Start: 2019-08-06 | End: 2019-11-08

## 2019-08-06 RX ORDER — IBUPROFEN 600 MG/1
600 TABLET, FILM COATED ORAL 4 TIMES DAILY PRN
Qty: 120 TABLET | Refills: 1 | Status: SHIPPED | OUTPATIENT
Start: 2019-08-06 | End: 2020-01-09

## 2019-08-06 ASSESSMENT — ENCOUNTER SYMPTOMS
DEPRESSION: 1
STIFFNESS: 1
NERVOUS/ANXIOUS: 1
ARTHRALGIAS: 1
TROUBLE SWALLOWING: 0
SMELL DISTURBANCE: 0
MUSCLE WEAKNESS: 1
SINUS CONGESTION: 1
EYE PAIN: 0
NECK PAIN: 1
NECK MASS: 0
PANIC: 0
HEMATURIA: 0
HOARSE VOICE: 0
EYE WATERING: 1
SINUS PAIN: 1
SORE THROAT: 1
FLANK PAIN: 1
DYSURIA: 1
MUSCLE CRAMPS: 1
DECREASED CONCENTRATION: 0
BACK PAIN: 1
JOINT SWELLING: 0
DOUBLE VISION: 0
INSOMNIA: 1
TASTE DISTURBANCE: 0
MYALGIAS: 1

## 2019-08-06 ASSESSMENT — ANXIETY QUESTIONNAIRES
6. BECOMING EASILY ANNOYED OR IRRITABLE: SEVERAL DAYS
2. NOT BEING ABLE TO STOP OR CONTROL WORRYING: SEVERAL DAYS
1. FEELING NERVOUS, ANXIOUS, OR ON EDGE: SEVERAL DAYS
GAD7 TOTAL SCORE: 7
GAD7 TOTAL SCORE: 7
4. TROUBLE RELAXING: MORE THAN HALF THE DAYS
3. WORRYING TOO MUCH ABOUT DIFFERENT THINGS: SEVERAL DAYS
7. FEELING AFRAID AS IF SOMETHING AWFUL MIGHT HAPPEN: NOT AT ALL
7. FEELING AFRAID AS IF SOMETHING AWFUL MIGHT HAPPEN: NOT AT ALL
5. BEING SO RESTLESS THAT IT IS HARD TO SIT STILL: SEVERAL DAYS

## 2019-08-06 ASSESSMENT — PAIN SCALES - GENERAL: PAINLEVEL: SEVERE PAIN (7)

## 2019-08-06 NOTE — TELEPHONE ENCOUNTER
Spoke with: Bill     Date Scheduled: 8/19/19 @ 7AM with 6AM arrival     Provider: Dr. Julian     : yes     F/U Appointment: 9/18/19     Patient was educated on pre-op nurse call: yes      Direct procedure: no

## 2019-08-06 NOTE — PATIENT INSTRUCTIONS
1. Start taking tizanidine 4mg twice daily as needed.    2. Start taking duloxetine 30mg once daily.      3. Referral to physical therapy.  Please call [phone] to make an appointment.     4. Release of information for the clinic you were seen at in [place].     5. Schedule procedure.     Follow up: 6 to 8 weeks         To speak with a nurse, schedule/reschedule/cancel a clinic appointment, or request a medication refill call: [phone]     You can also reach us by Rising: [email or web address]    For refills, please call on Monday, 1 week before your medication runs out. The doctors are not always in clinic, so this gives us time to get your prescriptions ready.  Please let us know the name of the medication you are requesting a refill of.                 When calling to schedule your procedure appointment, also make your follow up clinic appointment 4 weeks after the procedure.    Please call [phone] to schedule, reschedule, or cancel your procedure appointment.   Phones are answered Monday - Friday from 7:30 - 4:00pm.  Leave a voicemail with your name, birth date, and phone number if no one is available to take your call.     Your procedure: Right sacroiliac joint injetion     On the day of the procedure  1. Arrive 1 hour earlier than your scheduled time, [facility]  Address: [address] [place] [id number]  2. Check in on the 5th floor for your procedure    If you must reschedule your procedure more than two times, you must follow up in clinic before rescheduling again.        Preparing for your procedure    CAUTION - FAILURE TO FOLLOW THESE PRE-PROCEDURE INSTRUCTIONS WILL RESULT IN YOUR PROCEDURE BEING RESCHEDULED.            You must have a  take you home after your procedure. Transportation by taxi or para-transit is okay as long as you have a responsible adult accompany you. You must provide your 's full name and contact number  at time of check in.     Fasting Protocol You may have NOTHING SOLID TO EAT 8 HOURS prior to arrival at the procedure area.     You may have CLEAR LIQUIDS UP TO 2 HOURS prior to arrival.    Broth and candy are considered solid food and require an eight hour fast.     Clear liquids include water, clear fruit juice (no pulp), carbonated beverages, ice, black coffee, black tea, clear jello. No alcohol containing beverages.   Medications If you take any medications, DO NOT STOP. Take your medications as usual the day of your procedure with a sip of water AT LEAST 2 HOURS PRIOR TO ARRIVAL.    Antibiotics If you are currently taking antibiotics, you must complete the entire dose 7 days prior to your scheduled procedure. You must be clear of any signs or symptoms of infection. If you begin antibiotics, please contact our clinic for instructions.     Fever, Chills, or Rash If you experience a fever of higher than 100 degrees, chills, rash, or open wounds during the one week before your procedure, please call the clinic to see if you may proceed with your procedure.      Medication Hold List  **Patients under Cardiology/Neurology care should consult their provider prior to the pain procedure to verify pre-procedure medication instructions. The information below contains general guidelines.**    Blood Thinners If you are taking daily ASPIRIN, PLAVIX, OR OTHER BLOOD THINNERS SUCH AS COUMADIN/WARFARIN, we will need your prescribing doctor to sign a release permitting you to stop these medications. Once approved by your prescribing doctor - STOP ALL BLOOD THINNERS BASED ON THE TIME TABLE BELOW PRIOR TO YOUR PROCEDURE. If you have been instructed to stop WARFARIN(COUMADIN), you must have an INR lab drawn the day before your procedure. . Your INR must be within normal limits before we can perform your injection. MEDICATIONS CAN BE RESTARTED AFTER YOUR PROCEDURE.    14 DAY HOLD  Ticlid (ticlopidine)    10 DAY HOLD  Effient  (Prasugel)    3 DAY HOLD  Xarelto (rivaroxaban) 7 DAY HOLD  Anacin, Bufferin, Ecotrin, Excedrin, Aggrenox (Aspirin)  Brilinta (ticagrelor)  Coumadin (Warfarin)  Pradexa (Dabigatran)  Elmiron (Pentosan)  Plavix (Clopidogrel Bisulfate)  Pletal (Cilostazol)    24 HOUR HOLD  Lovenox (enoxaparin)  Agrylin (Anagrelide)                To speak with a nurse, schedule/reschedule/cancel a clinic appointment, or request a medication refill call: [phone]     You can also reach us by [employer]: [email or web address]

## 2019-08-06 NOTE — PROGRESS NOTES
"I had the pleasure of meeting Mr. Bill Wisdom on 8/6/2019 in the [facility] in consult for Dr. Hernandez with regards to his multiple site pain  Subjective:  The patient is a 49 year old male with past medical history of GERD, CHANDLER, COPD, chronic neck and back pain who presents for evaluation of low back, neck, and shoulder pain.  He reports pain in his low back primarily radiating down the legs.  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  It acutely worsened after a particularly hard day and has been fluctuating, but has not significantly improved since that time.  He reports that his pain is located primarily in the low lumbar region worse on the right and radiates to the upper buttocks and lateral thighs.  The patient describes the pain as burning and aching.  he reports that the pain is made worse by going up and down stairs, prolonged sitting, and prolonged standing.  His pain is improved with rest.  In addition, he reports intermittent subjective leg weakness associated with the pain.  He denies any new problems with falls or balance, any new numbness or weakness of the arms or legs, any new bowel or bladder incontinence, or any sudden or unexpected weight loss.  The patient's pain is progressively worse throughout the day.  He rates his average pain score at 8/10, but it can be as low as 7/10 or as severe as 9/10.   He also has burning pain in the right foot associated with his recently diagnosed gout.  He does report that he feels good with walking, but only after he has been for a walk.    Bill Wisdom has been seen at a pain clinic in the past.  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  Modifying Factors: most activities  Associated Signs/Symptoms: subjective " weakness    Current treatments include:  Acetaminophen    Previous medication treatments included:  Anti-convulsants: Gabapentin  Muscle relaxors: Flexeril  Anti-depressants: Duloxetine  NSAIDs: ibuprofen, some benefit  Acetaminophen: some benefit  Topicals: Not tried    Other treatments have included:  Physical therapy: reports multiple courses  Occupational therapy: not tried  Chiropractic: not tried  Acupuncture: not tried  TENs Unit: not tried  Injections: Unspecified type  Surgeries: not tried    Past Medical History:  Past medical history reviewed with patient.   Past Medical History:   Diagnosis Date     COPD (chronic obstructive pulmonary disease) (H)      Gastro-oesophageal reflux disease      History of tobacco abuse      Prediabetes       Patient Active Problem List   Diagnosis     CARDIOVASCULAR SCREENING; LDL GOAL LESS THAN 160     Family history of arteriosclerotic cardiovascular disease     Family history of diabetes mellitus     Chronic bilateral low back pain with right-sided sciatica     Spermatocele     Obesity     Esophageal reflux     Simple chronic bronchitis (H)     Neck pain       Past Surgical History:  past surgical history reviewed with patient.   Past Surgical History:   Procedure Laterality Date     HYDROCELECTOMY SCROTAL  2006     SPERMATOCELECTOMY  3/20/2012    Procedure:SPERMATOCELECTOMY; Left Spermatocelectomy; Surgeon:DELLA CROW; [facility]      Past Surgical History:   Procedure Laterality Date     HYDROCELECTOMY SCROTAL  2006     SPERMATOCELECTOMY  3/20/2012    Procedure:SPERMATOCELECTOMY; Left Spermatocelectomy; Surgeon:DELLA CROW; Location:[facility]       Medications:   Current Outpatient Medications   Medication     acetaminophen (TYLENOL) 500 MG tablet     albuterol (ACCUNEB) 1.25 MG/3ML neb solution     albuterol (PROVENTIL HFA) 108 (90 Base) MCG/ACT inhaler     CRANBERRY     famotidine (PEPCID) 20 MG tablet     FISH OIL     MILK THISTLE     naproxen (NAPROSYN) 500  MG tablet     ondansetron (ZOFRAN ODT) 4 MG ODT tab     indomethacin (INDOCIN) 50 MG capsule     No current facility-administered medications for this visit.        [place] and [place] Prescription Monitoring Program reviewed     Allergies:   No Known Allergies    Family History:  family history includes Alcohol/Drug in his sister; C.A.D. in his mother; Cerebrovascular Disease in his father; Hypertension in his father.    Social history:   Social History     Socioeconomic History     Marital status: Single     Spouse name: Not on file     Number of children: Not on file     Years of education: Not on file     Highest education level: Not on file   Occupational History     Not on file   Social Needs     Financial resource strain: Not on file     Food insecurity:     Worry: Not on file     Inability: Not on file     Transportation needs:     Medical: Not on file     Non-medical: Not on file   Tobacco Use     Smoking status: Former Smoker     Types: Cigarettes     Smokeless tobacco: Never Used   Substance and Sexual Activity     Alcohol use: No     Drug use: No     Sexual activity: Not Currently     Partners: Female     Comment: Past history of cocaine use   Lifestyle     Physical activity:     Days per week: Not on file     Minutes per session: Not on file     Stress: Not on file   Relationships     Social connections:     Talks on phone: Not on file     Gets together: Not on file     Attends Alevism service: Not on file     Active member of club or organization: Not on file     Attends meetings of clubs or organizations: Not on file     Relationship status: Not on file     Intimate partner violence:     Fear of current or ex partner: Not on file     Emotionally abused: Not on file     Physically abused: Not on file     Forced sexual activity: Not on file   Other Topics Concern     Parent/sibling w/ CABG, MI or angioplasty before 65F 55M? No   Social History Narrative     Not on file     He lives in [place]. He is  "currently working. 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  Smoking: former smoker Alcohol: denies Street drugs: denies     Review of Systems:  The 14 system ROS was reviewed from the intake questionnaire; results listed at end of note.    Physical Exam:  /71   Pulse 87   Resp 16   Ht 1.689 m (5' 6.5\")   BMI 43.56 kg/m    Body mass index is 43.56 kg/m .  Physical Exam   Constitutional: he is oriented to person, place, and time.  he appears well-developed and well-nourished. No distress.   HENT:   Head: Normocephalic and atraumatic.   Eyes: Pupils are equal, round, and reactive to light. EOM are normal. No scleral icterus.   Neck: Normal range of motion. Neck supple.   Cardiovascular: Normal rate and regular rhythm.   Pulmonary/Chest: Effort normal and breath sounds normal. No respiratory distress.   Abdominal: deferred  Genitourinary: deferred  Neuromuscular: Gait antalgic  Neurological: he is alert and oriented to person, place, and time. CN II-XII grossly intact, coordination grossly normal.  Romberg test negative.  Skin: Skin is warm and dry. he is not diaphoretic.   Psychiatric: he has a normal mood and affect. his behavior is normal. Judgment and thought content normal.    Lumbar Spine Exam:    There are tender points identified in the lumbar paraspinal musculature  There are not trigger points identified in the lumbar paraspinal musculature    Range of Motion Flexion: normal     Extension: reduced     Rotation: normal     Side-bending: normal    There IS exacerbation of the patient's typical pain with rotation and extension of the lumbar spine to the right side       Left  Right  Straight leg raise Negative Negative  Stinchfield test  Negative Positive (for low back pain)    Lower Extremity Manual Muscle Testing    Motor (0 to 5 scale):        Right     Left    Hip flexion (L1/2)            5       5    Knee extension (L2,3,4)  5              5    Ankle dorsiflexion (L4/5)             5      "         5    Long toe extension (L5)              5              5    Ankle plantar flexion(S1)            5              5    Reflexes (0 to 4 scale):    Right    Left    Patellar (L4)              1            1    Achilles (S1)              1      1    Sensation:    Light touch: intact    Upper Extremity Manual Muscle Testing    Motor (0 to 5 scale):       Right  Left      Shoulder Abd (C5/6)       5                  5    Elbow flexion (C5/6)      5                  5    Wrist extension (C6)      5                  5    Finger flexion (C8/T1)    5                  5    Finger ABduction (C8/T1)  5             5                                                                   Reflexes (0 to 4 scale):   Right      Left      Biceps (C5)               1            1    Brachioradialis (C6)     1                     1    Tricep (C7)               1  1    Sensation:    Light touch: intact    SI Joint Exam:    There IS: IS tenderness over his right  PSIS/sacroiliac joint    SIJ Maneuvers  LEFT   RIGHT      IRNA  Negative  Positive    Distraction  Negative  Negative    Compression Negative  Negative    Sacral Thrust Negative  Positive    Yeoman's Test Negative  Positive    Imaging:  None available    Assessment:    The patient is a 49 year old male with PMHx of GERD, CHANDLER, COPD, chronic neck and back pain who was referred by Dr. Hernandez for evaluation of chronic neck, shoulder, low back and buttock pain.  He likely has multifactorial neck and back pain without evidence of central spinal canal or neuroforaminal impingement.  On exam, he appears to have components of myofascial pain in both his cervical and lumbar regions.   He also has right sided SIJ pain on exam, as well as likely pain related to the piriformis muscles and possible right deep hip flexors.  He will likely require continued therapy with multiple modalities to make significant improvements    Plan:  1) Low back pain:  Multifactorial in origin with  components of myofascial pain, SI joint dysfunction and possible facet joint pain.  Will start with PT to target myofascial pain and SI joint injection for right SIJ dysfunction.  Depending on the result of these interventions, will consider lumbar MBB in the future.  Request sent for outside records, however if records are not obtained, will order lumbar spine XR.    Work up:    - Patient will sign KASANDRA for imaging and records from his previous pain clinic  Medications:    - Trial Cymbalta 30mg daily    - Tizanidine 4mg BID PRN #30    - Ibuprofen 600mg QID PRN  Therapies:    -  Referral PT for low back myofascial pain  Interventions:    -  Right SIJ    2) Neck and shoulder pain:  Likely multifactorial with components of myofascial pain and possibly cervical facet pain.  Will defer interventions until outside records are obtained.  If outside records cannot be obtained, will order cervical spine Xray.    Work up:    -  KASANDRA signed, await records  Medications:    -  Medication trials as above  Therapies:    -  PT as above  Interventions:    -  Deferred until outside records obtained    Follow up: 6-8 weeks    Thank you for the consult.    Sina Rojas MD    Department of Anesthesiology  Pain Management Division    Answers for HPI/ROS submitted by the patient on 8/6/2019   MATTHEW 7 TOTAL SCORE: 7  General Symptoms: No  Skin Symptoms: No  HENT Symptoms: Yes  EYE SYMPTOMS: Yes  HEART SYMPTOMS: No  LUNG SYMPTOMS: No  INTESTINAL SYMPTOMS: No  URINARY SYMPTOMS: Yes  REPRODUCTIVE SYMPTOMS: No  SKELETAL SYMPTOMS: Yes  BLOOD SYMPTOMS: No  NERVOUS SYSTEM SYMPTOMS: No  MENTAL HEALTH SYMPTOMS: Yes  Ear pain: No  Ear discharge: No  Hearing loss: No  Tinnitus: No  Nosebleeds: No  Congestion: Yes  Sinus pain: Yes  Trouble swallowing: No   Voice hoarseness: No  Mouth sores: No  Sore throat: Yes  Tooth pain: Yes  Gum tenderness: Yes  Bleeding gums: Yes  Change in taste: No  Change in sense of smell: No  Dry mouth:  Yes  Hearing aid used: No  Neck lump: No  Eye pain: No  Vision loss: No  Dry eyes: No  Watery eyes: Yes  Eye bulging: No  Double vision: No  Flashing of lights: No  Spots: No  Trouble holding urine or incontinence: Yes  Pain or burning: Yes  Trouble starting or stopping: No  Increased frequency of urination: Yes  Blood in urine: No  Decreased frequency of urination: No  Frequent nighttime urination: Yes  Flank pain: Yes  Back pain: Yes  Muscle aches: Yes  Neck pain: Yes  Swollen joints: No  Joint pain: Yes  Bone pain: Yes  Muscle cramps: Yes  Muscle weakness: Yes  Joint stiffness: Yes  Bone fracture: No  Nervous or Anxious: Yes  Depression: Yes  Trouble sleeping: Yes  Trouble thinking or concentrating: No  Mood changes: Yes  Panic attacks: No

## 2019-08-06 NOTE — LETTER
8/6/2019       RE: Bill Wisdom  [address]  [place] [address]     Dear Colleague,    Thank you for referring your patient, Bill Wisdom, to the [facility] at [facility]. Please see a copy of my visit note below.    I had the pleasure of meeting Mr. Bill Wisdom on 8/6/2019 in the [facility] in consult for Dr. Hernandez with regards to his multiple site pain    Subjective:  The patient is a 49 year old male with past medical history of GERD, CHANDLER, COPD, chronic neck and back pain  who presents for evaluation of low back, neck, and shoulder pain.  He reports pain in his low back primarily radiating down the legs.  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  It acutely worsened after a particularly hard day and has been fluctuating, but has not significantly improved since that time.  He reports that his pain is located primarily in the low lumbar region worse on the right and radiates to the upper buttocks and lateral thighs.  The patient describes the pain as burning and aching.  he reports that the pain is made worse by going up and down stairs, prolonged sitting, and prolonged standing.  His pain is improved with rest.  In addition, he reports intermittent subjective leg weakness associated with the pain.  He denies any new problems with falls or balance, any new numbness or weakness of the arms or legs, any new bowel or bladder incontinence, or any sudden or unexpected weight loss.  The patient's pain is progressively worse throughout the day.  He rates his average pain score at 8/10, but it can be as low as 7/10 or as severe as 9/10.   He also has burning pain in the right foot associated with his recently diagnosed gout.  He does report that he feels good with walking, but only after he has been for a walk.    Bill Wisdom has been seen at a pain clinic in the past.  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  Modifying Factors: most activities  Associated Signs/Symptoms: subjective weakness    Current treatments include:  Acetaminophen    Previous medication treatments included:  Anti-convulsants: Gabapentin  Muscle relaxors: Flexeril  Anti-depressants: Duloxetine  NSAIDs: ibuprofen, some benefit  Acetaminophen: some benefit  Topicals: Not tried    Other treatments have included:  Physical therapy: reports multiple courses  Occupational therapy: not tried  Chiropractic: not tried  Acupuncture: not tried  TENs Unit: not tried  Injections: Unspecified type  Surgeries: not tried    Past Medical History:  Past medical history reviewed with patient.   Past Medical History:   Diagnosis Date     COPD (chronic obstructive pulmonary disease) (H)      Gastro-oesophageal reflux disease      History of tobacco abuse      Prediabetes       Patient Active Problem List   Diagnosis     CARDIOVASCULAR SCREENING; LDL GOAL LESS THAN 160     Family history of arteriosclerotic cardiovascular disease     Family history of diabetes mellitus     Chronic bilateral low back pain with right-sided sciatica     Spermatocele     Obesity     Esophageal reflux     Simple chronic bronchitis (H)     Neck pain       Past Surgical History:  past surgical history reviewed with patient.   Past Surgical History:   Procedure Laterality Date     HYDROCELECTOMY SCROTAL  2006     SPERMATOCELECTOMY  3/20/2012    Procedure:SPERMATOCELECTOMY; Left Spermatocelectomy; Surgeon:DELLA CROW; [facility]      Past Surgical History:   Procedure Laterality Date     HYDROCELECTOMY SCROTAL  2006     SPERMATOCELECTOMY  3/20/2012    Procedure:SPERMATOCELECTOMY; Left Spermatocelectomy; Surgeon:DELLA CROW; Location[facility] "       Medications:   Current Outpatient Medications   Medication     acetaminophen (TYLENOL) 500 MG tablet     albuterol (ACCUNEB) 1.25 MG/3ML neb solution     albuterol (PROVENTIL HFA) 108 (90 Base) MCG/ACT inhaler     CRANBERRY     famotidine (PEPCID) 20 MG tablet     FISH OIL     MILK THISTLE     naproxen (NAPROSYN) 500 MG tablet     ondansetron (ZOFRAN ODT) 4 MG ODT tab     indomethacin (INDOCIN) 50 MG capsule     No current facility-administered medications for this visit.        [place] and [place] Prescription Monitoring Program reviewed     Allergies:   No Known Allergies    Family History:  family history includes Alcohol/Drug in his sister; C.A.D. in his mother; Cerebrovascular Disease in his father; Hypertension in his father.    Social history:   Social History     Socioeconomic History     Marital status: Single     Spouse name: Not on file     Number of children: Not on file     Years of education: Not on file     Highest education level: Not on file   Occupational History     Not on file   Social Needs     Financial resource strain: Not on file     Food insecurity:     Worry: Not on file     Inability: Not on file     Transportation needs:     Medical: Not on file     Non-medical: Not on file   Tobacco Use     Smoking status: Former Smoker     Types: Cigarettes     Smokeless tobacco: Never Used   Substance and Sexual Activity     Alcohol use: No     Drug use: No     Sexual activity: Not Currently     Partners: Female     Comment: Past history of cocaine use   Lifestyle     Physical activity:     Days per week: Not on file     Minutes per session: Not on file     Stress: Not on file   Relationships     Social connections:     Talks on phone: Not on file     Gets together: Not on file     Attends Spiritism service: Not on file     Active member of club or organization: Not on file     Attends meetings of clubs or organizations: Not on file     Relationship status: Not on file     Intimate partner violence:      "Fear of current or ex partner: Not on file     Emotionally abused: Not on file     Physically abused: Not on file     Forced sexual activity: Not on file   Other Topics Concern     Parent/sibling w/ CABG, MI or angioplasty before 65F 55M? No   Social History Narrative     Not on file     He lives in [place]. He is currently working. 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  Smoking: former smoker Alcohol: denies Street drugs: denies     Review of Systems:  The 14 system ROS was reviewed from the intake questionnaire; results listed at end of note.    Physical Exam:  /71   Pulse 87   Resp 16   Ht 1.689 m (5' 6.5\")   BMI 43.56 kg/m     Body mass index is 43.56 kg/m .  Physical Exam   Constitutional: he is oriented to person, place, and time.  he appears well-developed and well-nourished. No distress.   HENT:   Head: Normocephalic and atraumatic.   Eyes: Pupils are equal, round, and reactive to light. EOM are normal. No scleral icterus.   Neck: Normal range of motion. Neck supple.   Cardiovascular: Normal rate and regular rhythm.   Pulmonary/Chest: Effort normal and breath sounds normal. No respiratory distress.   Abdominal: deferred  Genitourinary: deferred  Neuromuscular: Gait antalgic  Neurological: he is alert and oriented to person, place, and time. CN II-XII grossly intact, coordination grossly normal.  Romberg test negative.  Skin: Skin is warm and dry. he is not diaphoretic.   Psychiatric: he has a normal mood and affect. his behavior is normal. Judgment and thought content normal.    Lumbar Spine Exam:    There are tender points identified in the lumbar paraspinal musculature  There are not trigger points identified in the lumbar paraspinal musculature    Range of Motion Flexion: normal     Extension: reduced     Rotation: normal     Side-bending: normal    There IS exacerbation of the patient's typical pain with rotation and extension of the lumbar spine to the right " side       Left  Right  Straight leg raise Negative Negative  Stinchfield test  Negative Positive (for low back pain)    Lower Extremity Manual Muscle Testing    Motor (0 to 5 scale):        Right     Left    Hip flexion (L1/2)            5       5    Knee extension (L2,3,4)  5              5    Ankle dorsiflexion (L4/5)             5              5    Long toe extension (L5)              5              5    Ankle plantar flexion(S1)            5              5    Reflexes (0 to 4 scale):    Right    Left    Patellar (L4)              1            1    Achilles (S1)              1      1    Sensation:    Light touch: intact    Upper Extremity Manual Muscle Testing    Motor (0 to 5 scale):       Right  Left      Shoulder Abd (C5/6)       5                  5    Elbow flexion (C5/6)      5                  5    Wrist extension (C6)      5                  5    Finger flexion (C8/T1)    5                  5    Finger ABduction (C8/T1)  5             5                                                                   Reflexes (0 to 4 scale):   Right      Left      Biceps (C5)               1            1    Brachioradialis (C6)     1                     1    Tricep (C7)               1  1    Sensation:    Light touch: intact    SI Joint Exam:    There IS: IS tenderness over his right  PSIS/sacroiliac joint    SIJ Maneuvers  LEFT   RIGHT      RINA  Negative  Positive    Distraction  Negative  Negative    Compression Negative  Negative    Sacral Thrust Negative  Positive    Yeoman's Test Negative  Positive    Imaging:  None available    Assessment:    The patient is a 49 year old male with PMHx of GERD, CHANDLER, COPD, chronic neck and back pain who was referred by Dr. Hernandez for evaluation of chronic neck, shoulder, low back and buttock pain.  He likely has multifactorial neck and back pain without evidence of central spinal canal or neuroforaminal impingement.  On exam, he appears to have components of myofascial pain in  both his cervical and lumbar regions.   He also has right sided SIJ pain on exam, as well as likely pain related to the piriformis muscles and possible right deep hip flexors.  He will likely require continued therapy with multiple modalities to make significant improvements    Plan:  1) Low back pain:  Multifactorial in origin with components of myofascial pain, SI joint dysfunction and possible facet joint pain.  Will start with PT to target myofascial pain and SI joint injection for right SIJ dysfunction.  Depending on the result of these interventions, will consider lumbar MBB in the future.  Request sent for outside records, however if records are not obtained, will order lumbar spine XR.    Work up:    - Patient will sign KASANDRA for imaging and records from his previous pain clinic  Medications:    - Trial Cymbalta 30mg daily    - Tizanidine 4mg BID PRN #30    - Ibuprofen 600mg QID PRN  Therapies:    -  Referral PT for low back myofascial pain  Interventions:    -  Right SIJ    2) Neck and shoulder pain:  Likely multifactorial with components of myofascial pain and possibly cervical facet pain.  Will defer interventions until outside records are obtained.  If outside records cannot be obtained, will order cervical spine Xray.    Work up:    -  KASANDRA signed, await records  Medications:    -  Medication trials as above  Therapies:    -  PT as above  Interventions:    -  Deferred until outside records obtained    Follow up: 6-8 weeks    Thank you for the consult.    Sina Rojas MD    Department of Anesthesiology  Pain Management Division

## 2019-08-06 NOTE — NURSING NOTE
AVS reviewed with pt and given copy.  Pre-procedure instructions reviewed, including NPO orders,  needed, and medications to hold.  Pt verbalized understanding and declined having questions at this time.     RNCC requested procedure  contact pt for assistance with appointments.     Ellen Nicholas, RN, BSN

## 2019-08-07 ASSESSMENT — ANXIETY QUESTIONNAIRES: GAD7 TOTAL SCORE: 7

## 2019-08-16 ENCOUNTER — TELEPHONE (OUTPATIENT)
Dept: INTERNAL MEDICINE | Facility: CLINIC | Age: 49
End: 2019-08-16

## 2019-08-16 DIAGNOSIS — R26.81 UNSTABLE GAIT: Primary | ICD-10-CM

## 2019-08-16 DIAGNOSIS — G89.29 CHRONIC BILATERAL LOW BACK PAIN WITH RIGHT-SIDED SCIATICA: ICD-10-CM

## 2019-08-16 DIAGNOSIS — M54.41 CHRONIC BILATERAL LOW BACK PAIN WITH RIGHT-SIDED SCIATICA: ICD-10-CM

## 2019-08-16 NOTE — TELEPHONE ENCOUNTER
[facility] Call Center    Phone Message    May a detailed message be left on voicemail: yes    Reason for Call: Other: Pt states he broke his walking cane, and wanted to ask if he could get orders for a new one. Insurance will cover the cost, per pt. Please call to discuss, pt not sure where he should  the cane/where clinic should send orders.    Action Taken: Message routed to:  [facility] SALMA

## 2019-08-27 ENCOUNTER — TELEPHONE (OUTPATIENT)
Dept: ANESTHESIOLOGY | Facility: CLINIC | Age: 49
End: 2019-08-27

## 2019-08-27 NOTE — TELEPHONE ENCOUNTER
Spoke with: Bill     Date Scheduled: 9/16/19    Procedure Time: 8:10    Arrival Time: 7:10     Provider: Dr. Laboy     : Yes     F/U Appointment: n/a     Patient was educated on pre-op nurse call: yes      Direct procedure:  no

## 2019-08-27 NOTE — TELEPHONE ENCOUNTER
I called Bill to update regarding the signed order for a cane. There was no answer an no option to leave a message.    Sujey Walsh RN

## 2019-08-28 NOTE — TELEPHONE ENCOUNTER
[facility] Call Center    Phone Message    May a detailed message be left on voicemail: yes    Reason for Call: Other: Patient will be coming to the clinic to  his prescription/order for a new cane.  He did not know of a home supply company to send it to.  He will probably come 8/29/19       Action Taken: Message routed to:  [facility] Primary Care

## 2019-08-29 ENCOUNTER — TELEPHONE (OUTPATIENT)
Dept: INTERNAL MEDICINE | Facility: CLINIC | Age: 49
End: 2019-08-29

## 2019-08-29 NOTE — TELEPHONE ENCOUNTER
Looked as patient came to  DME, RN had it and it was delivered by staff. Nadine Cary Paramedic on 8/29/2019 at 10:30 AM

## 2019-09-16 ENCOUNTER — HOSPITAL ENCOUNTER (OUTPATIENT)
Facility: AMBULATORY SURGERY CENTER | Age: 49
End: 2019-09-16
Attending: ANESTHESIOLOGY
Payer: COMMERCIAL

## 2019-09-16 ENCOUNTER — ANCILLARY PROCEDURE (OUTPATIENT)
Dept: RADIOLOGY | Facility: AMBULATORY SURGERY CENTER | Age: 49
End: 2019-09-16
Attending: ANESTHESIOLOGY
Payer: COMMERCIAL

## 2019-09-16 VITALS
HEART RATE: 62 BPM | RESPIRATION RATE: 16 BRPM | SYSTOLIC BLOOD PRESSURE: 116 MMHG | HEIGHT: 67 IN | OXYGEN SATURATION: 96 % | BODY MASS INDEX: 42.38 KG/M2 | DIASTOLIC BLOOD PRESSURE: 72 MMHG | WEIGHT: 270 LBS | TEMPERATURE: 97.7 F

## 2019-09-16 DIAGNOSIS — R52 PAIN: ICD-10-CM

## 2019-09-16 RX ORDER — METHYLPREDNISOLONE ACETATE 40 MG/ML
INJECTION, SUSPENSION INTRA-ARTICULAR; INTRALESIONAL; INTRAMUSCULAR; SOFT TISSUE PRN
Status: DISCONTINUED | OUTPATIENT
Start: 2019-09-16 | End: 2019-09-16 | Stop reason: HOSPADM

## 2019-09-16 RX ORDER — IOPAMIDOL 408 MG/ML
INJECTION, SOLUTION INTRAVASCULAR PRN
Status: DISCONTINUED | OUTPATIENT
Start: 2019-09-16 | End: 2019-09-16 | Stop reason: HOSPADM

## 2019-09-16 RX ORDER — LIDOCAINE HYDROCHLORIDE 10 MG/ML
INJECTION, SOLUTION EPIDURAL; INFILTRATION; INTRACAUDAL; PERINEURAL PRN
Status: DISCONTINUED | OUTPATIENT
Start: 2019-09-16 | End: 2019-09-16 | Stop reason: HOSPADM

## 2019-09-16 RX ORDER — BUPIVACAINE HYDROCHLORIDE 2.5 MG/ML
INJECTION, SOLUTION EPIDURAL; INFILTRATION; INTRACAUDAL PRN
Status: DISCONTINUED | OUTPATIENT
Start: 2019-09-16 | End: 2019-09-16 | Stop reason: HOSPADM

## 2019-09-16 ASSESSMENT — MIFFLIN-ST. JEOR: SCORE: 2038.34

## 2019-09-16 NOTE — DISCHARGE INSTRUCTIONS
Home Care Instructions after a Sacroiliac Joint Injection        Activity  -You may resume most normal activity levels with the exception of strenuous activity. It is important for us to know if your pain with normal activity is relieved after this injection.  -DO NOT shower for 24 hours  -DO NOT remove bandaid for 24 hours    Pain  -You may experience soreness at the injection site for one or two days  -You may use an ice pack for 20 minutes every 2 hours for the first 24 hours  -You may use a heating pad after the first 24 hours  -You may use Tylenol (acetaminophen) every 4 hours or other pain medicines as directed by your physician    You may experience numbness radiating into your legs or arms (depending on the procedure location). This numbness may last several hours. Until sensation returns to normal; please use caution in walking, climbing stairs, and stepping out of your vehicle, etc.    DID YOU RECEIVE SEDATION TODAY?  No      DID YOU RECEIVE STEROIDS TODAY?  Yes    Common side effects of steroids:  Not everyone will experience corticosteroid side effects. If side effects are experienced, they will gradually subside in the 7-10 day period following an injection. Most common side effects include:  -Flushed face and/or chest  -Feeling of warmth, particularly in the face but could be an overall feeling of warmth  -Increased blood sugar in diabetic patients  -Menstrual irregularities my occur. If taking hormone-based birth control an alternate method of birth control is recommended  -Sleep disturbances and/or mood swings are possible  -Leg cramps      PLEASE KEEP TRACK OF YOUR SYMPTOMS AND NOTE YOUR IMPROVEMENT FOR YOUR DOCTOR.     Please contact us if you have:  -Severe pain  -Fever more than 101.5 degrees Fahrenheit  -Signs of infection at the injection site (redness, swelling, or drainage)    If you have questions, please contact our office at [phone] between the hours of 7:00 am and 3:00 pm Monday  through Friday. After office hours you can contact the on call provider by dialing [phone]. If you need immediate attention, we recommend that you go to a hospital emergency room or dial [phone].

## 2019-09-16 NOTE — H&P
"Abbreviated History and Physical    Patient Name: Bill Wisdom  YOB: 1970    Reason for Procedure: Right sacroiliac joint pain    History: Bill Wisdom is a 49 year old year old male who presents today for right sacroiliac joint injection with steroid.  He has a history of right sided low back and buttock pain for which the aforementioned procedure will likely provide significant therapeutic benefit and/or diagnostic value.  We discussed the procedure at length, including the risks, benefits, and alternatives, and he wishes to proceed with the procedure.  he denies any recent anticoagulant use, recent steroid exposure, recent or upcoming surgeries, or recent signs of infection or systemic illness.    Past Medical History:   Diagnosis Date     COPD (chronic obstructive pulmonary disease) (H)      Gastro-oesophageal reflux disease      History of tobacco abuse      Obese      Prediabetes        History of obstructive sleep apnea? NA    History of problems with sedation? NA    Physical exam:  /72   Pulse 62   Temp 97.7  F (36.5  C) (Temporal)   Resp 16   Ht 1.702 m (5' 7\")   Wt 122.5 kg (270 lb)   SpO2 96%   BMI 42.29 kg/m    General: in no apparent distress   Neuro: At least antigravity strength noted in all 4 extremities  Respiratory: Clear to ausculation bilaterally   Cardiac: Regular rate and rhythm  Skin: No rashes or lesions on exposed areas of skin    Medications:   Current Outpatient Medications   Medication     acetaminophen (TYLENOL) 500 MG tablet     albuterol (ACCUNEB) 1.25 MG/3ML neb solution     albuterol (PROVENTIL HFA) 108 (90 Base) MCG/ACT inhaler     CRANBERRY     FISH OIL     MILK THISTLE     naproxen (NAPROSYN) 500 MG tablet     DULoxetine (CYMBALTA) 30 MG capsule     famotidine (PEPCID) 20 MG tablet     ibuprofen (ADVIL/MOTRIN) 600 MG tablet     ondansetron (ZOFRAN ODT) 4 MG ODT tab     order for DME     No current facility-administered medications for this encounter.  "       Allergies:   No Known Allergies    ASA Classification: 2    OK for local anesthetic use.     Sina Rojas MD    Department of Anesthesiology  Pain Management Division

## 2019-09-16 NOTE — PROCEDURES
Patient: Bill Wisdom Age: 49 year old   MRN: [id number] Attending: Dr. Rojas     Date of Visit: September 16, 2019    PAIN MEDICINE CLINIC PROCEDURE NOTE    ATTENDING CLINICIAN:    Sina Rojas MD    ASSISTANT CLINICIAN:  Roman Isaac MD    PREPROCEDURE DIAGNOSES:  1.  right low back pain   2.  Sacroiliitis - right    POSTPROCEDURE DIAGONSES:  Same as above    PROCEDURE(S) PERFORMED:  1.  right sacroiliac joint injection  2.  Fluoroscopic guidance for the above-named procedure(s)    ANESTHESIA:  Local    MEDICATIONS ADMINISTERED:  Depomedrol 40mg/mL : 1 mL  Bupivacaine 0.25% : 1mL    COMPLICATIONS:  None.    INDICATIONS:  Bill Wisdom is a 49 year old male with a history of chronic low back pain secondary to sacroiliitis .  The patient stated that he was in his usual state of health and denied recent anticoagulant use or recent infections.  Therefore, the plan is to perform above mentioned procedures.     Procedure Details:  Written informed consent was obtained and saved in the electronic medical record, after the risks, benefits, and alternatives were discussed with the patient.  All of the patient s questions were answered.  The patient was brought to the procedure room, where he was identified by name, medical record number and date of birth.      The patient was placed in the prone position on the procedure room table.  All pressure points were checked and comfortably padded.  Routine monitors were placed.  Vital signs were stable.  A formal time-out procedure was performed, as per protocol, including patient name, title of procedure, and site of procedure, and all in the room concurred.    A chlorhexidine prep was completed followed by sterile draping per standard procedure.     AP fluoroscopic guidance was used to identify the right SI joint, with slight contralateral oblique tilt, until the joint was maximally visualized.   After 1% lidocaine infiltration using a 25 gauge 1.5 inch needle, a 3.5 inch  spinal needle was introduced and advanced through the anesthetized plane and into the joint space.  After negative aspiration for heme, we injected 0.5-1 ml of Isovue contrast into SI joint.  Dye spread was equivocal with regard to location within the SI joint.  A lateral fluoroscopic image was taken to confirm depth and appropriate dye spread..    After negative aspiration for heme, 2 mL of a treatment mixture containing containing the above listed injection solution was injected into the SI joint, The needle was then removed.  A contralateral procedure WAS NOT performed.    Light pressure was held at the puncture site(s) to prevent ecchymosis and oozing.  The patient's skin was cleansed, and hemostasis was confirmed.  Band-aids were applied to the needle injection site(s).    Condition:    The patient remained awake and alert throughout the procedure.  The patient tolerated the procedure well and was monitored for approximately 15 minutes afterward in the post procedure area.  There were no immediate post procedure complications noted.  The patient was then discharged to home as per protocol.      Pre-procedure pain score: 9/10  Post-procedure pain score: 4/10    Sina Rojas MD    Department of Anesthesiology  Pain Management Division

## 2019-09-23 ENCOUNTER — TELEPHONE (OUTPATIENT)
Dept: INTERNAL MEDICINE | Facility: CLINIC | Age: 49
End: 2019-09-23

## 2019-09-23 DIAGNOSIS — E66.9 OBESITY, UNSPECIFIED CLASSIFICATION, UNSPECIFIED OBESITY TYPE, UNSPECIFIED WHETHER SERIOUS COMORBIDITY PRESENT: Primary | ICD-10-CM

## 2019-09-23 NOTE — TELEPHONE ENCOUNTER
Patient would like a referral to Weight Management. Referral pended for provider. Reena Enrique LPN 9/23/2019 10:51 AM

## 2019-09-23 NOTE — TELEPHONE ENCOUNTER
[facility] Call Center    Phone Message    May a detailed message be left on voicemail: yes    Reason for Call: Other: PT requesting a referral to the weight loss program at the .  Please follow up.      Action Taken: Message routed to:  [facility] [place]

## 2019-09-24 NOTE — TELEPHONE ENCOUNTER
Tried calling patient, no answered. Left message with Weight clinic number for patient to call to schedule referral.

## 2019-09-30 ENCOUNTER — TELEPHONE (OUTPATIENT)
Dept: SURGERY | Facility: CLINIC | Age: 49
End: 2019-09-30

## 2019-09-30 NOTE — TELEPHONE ENCOUNTER
Patient interested in weight loss surgery     Bill Wisdom         Instructed to check with insurance company regarding exclusions prior to first appt.     Scheduled to see Anna Marie Bejarano PA-C at 7:00am, followed by an appt with a dietitian Bee Walsh at 8:00am.     Talked to pt regarding appointment on 10/02/2019.     Can find information on bariatrix products at: [email or web address]    Instructed to view seminar and complete [email or web address]     [phone] contact center phone number

## 2019-10-02 ENCOUNTER — OFFICE VISIT (OUTPATIENT)
Dept: SURGERY | Facility: CLINIC | Age: 49
End: 2019-10-02
Payer: COMMERCIAL

## 2019-10-02 ENCOUNTER — TELEPHONE (OUTPATIENT)
Dept: SURGERY | Facility: CLINIC | Age: 49
End: 2019-10-02

## 2019-10-02 VITALS
TEMPERATURE: 97.8 F | OXYGEN SATURATION: 97 % | BODY MASS INDEX: 45.96 KG/M2 | HEART RATE: 58 BPM | HEIGHT: 67 IN | DIASTOLIC BLOOD PRESSURE: 71 MMHG | SYSTOLIC BLOOD PRESSURE: 130 MMHG | WEIGHT: 292.8 LBS

## 2019-10-02 DIAGNOSIS — R73.03 PREDIABETES: ICD-10-CM

## 2019-10-02 DIAGNOSIS — G47.30 SLEEP APNEA, UNSPECIFIED TYPE: ICD-10-CM

## 2019-10-02 DIAGNOSIS — Z87.891 HISTORY OF TOBACCO ABUSE: ICD-10-CM

## 2019-10-02 DIAGNOSIS — E66.01 MORBID OBESITY (H): ICD-10-CM

## 2019-10-02 DIAGNOSIS — E66.01 MORBID OBESITY (H): Primary | ICD-10-CM

## 2019-10-02 LAB
ALBUMIN SERPL-MCNC: 3.9 G/DL (ref 3.4–5)
ALP SERPL-CCNC: 77 U/L (ref 40–150)
ALT SERPL W P-5'-P-CCNC: 55 U/L (ref 0–70)
ANION GAP SERPL CALCULATED.3IONS-SCNC: 4 MMOL/L (ref 3–14)
AST SERPL W P-5'-P-CCNC: 21 U/L (ref 0–45)
BILIRUB SERPL-MCNC: 0.4 MG/DL (ref 0.2–1.3)
BUN SERPL-MCNC: 15 MG/DL (ref 7–30)
CALCIUM SERPL-MCNC: 9 MG/DL (ref 8.5–10.1)
CHLORIDE SERPL-SCNC: 106 MMOL/L (ref 94–109)
CHOLEST SERPL-MCNC: 203 MG/DL
CO2 SERPL-SCNC: 30 MMOL/L (ref 20–32)
CREAT SERPL-MCNC: 0.84 MG/DL (ref 0.66–1.25)
DEPRECATED CALCIDIOL+CALCIFEROL SERPL-MC: 8 UG/L (ref 20–75)
ERYTHROCYTE [DISTWIDTH] IN BLOOD BY AUTOMATED COUNT: 14.5 % (ref 10–15)
GFR SERPL CREATININE-BSD FRML MDRD: >90 ML/MIN/{1.73_M2}
GLUCOSE SERPL-MCNC: 94 MG/DL (ref 70–99)
HBA1C MFR BLD: 6.5 % (ref 0–5.6)
HCT VFR BLD AUTO: 46.2 % (ref 40–53)
HDLC SERPL-MCNC: 45 MG/DL
HGB BLD-MCNC: 14.2 G/DL (ref 13.3–17.7)
LDLC SERPL CALC-MCNC: 139 MG/DL
MCH RBC QN AUTO: 26.9 PG (ref 26.5–33)
MCHC RBC AUTO-ENTMCNC: 30.7 G/DL (ref 31.5–36.5)
MCV RBC AUTO: 88 FL (ref 78–100)
NONHDLC SERPL-MCNC: 158 MG/DL
PLATELET # BLD AUTO: 261 10E9/L (ref 150–450)
POTASSIUM SERPL-SCNC: 4 MMOL/L (ref 3.4–5.3)
PROT SERPL-MCNC: 7.8 G/DL (ref 6.8–8.8)
PTH-INTACT SERPL-MCNC: 53 PG/ML (ref 18–80)
RBC # BLD AUTO: 5.28 10E12/L (ref 4.4–5.9)
SODIUM SERPL-SCNC: 140 MMOL/L (ref 133–144)
TRIGL SERPL-MCNC: 93 MG/DL
WBC # BLD AUTO: 9.8 10E9/L (ref 4–11)

## 2019-10-02 RX ORDER — TOPIRAMATE 25 MG/1
TABLET, FILM COATED ORAL
Qty: 90 TABLET | Refills: 3 | Status: SHIPPED | OUTPATIENT
Start: 2019-10-02 | End: 2019-10-02

## 2019-10-02 RX ORDER — BIOTIN 10 MG
1 TABLET ORAL DAILY
Qty: 60 TABLET | Refills: 11 | Status: SHIPPED | OUTPATIENT
Start: 2019-10-02 | End: 2020-05-26

## 2019-10-02 RX ORDER — TOPIRAMATE 25 MG/1
TABLET, FILM COATED ORAL
Qty: 90 TABLET | Refills: 3 | Status: SHIPPED | OUTPATIENT
Start: 2019-10-02 | End: 2019-11-04

## 2019-10-02 ASSESSMENT — MIFFLIN-ST. JEOR: SCORE: 2141.76

## 2019-10-02 ASSESSMENT — PAIN SCALES - GENERAL: PAINLEVEL: EXTREME PAIN (8)

## 2019-10-02 NOTE — LETTER
We received and reviewed your test results done on 09/30/2019.  You may receive more than one letter if we receive the results on multiple days.  Please share all lab and test results with your primary care provider and keep a copy for your own records.        Your test results are normal except for the following addressed below:  Your  hemoglobin A1C is > or = 6.5 which suggests diabetes mellitus.    If you have any questions, feel free contact us at the Call Center [phone].      Resulted Orders   Lipid panel reflex to direct LDL Fasting   Result Value Ref Range    Cholesterol 203 (H) <200 mg/dL      Comment:      Desirable:       <200 mg/dl    Triglycerides 93 <150 mg/dL    HDL Cholesterol 45 >39 mg/dL    LDL Cholesterol Calculated 139 (H) <100 mg/dL      Comment:      Above desirable:  100-129 mg/dl  Borderline High:  130-159 mg/dL  High:             160-189 mg/dL  Very high:       >189 mg/dl      Non HDL Cholesterol 158 (H) <130 mg/dL      Comment:      Above Desirable:  130-159 mg/dl  Borderline high:  160-189 mg/dl  High:             190-219 mg/dl  Very high:       >219 mg/dl     Parathyroid Hormone Intact   Result Value Ref Range    Parathyroid Hormone Intact 53 18 - 80 pg/mL   Vitamin D Deficiency   Result Value Ref Range    Vitamin D Deficiency screening 8 (L) 20 - 75 ug/L      Comment:      Season, race, dietary intake, and treatment affect the concentration of   25-hydroxy-Vitamin D. Values may decrease during winter months and increase   during summer months. Values 20-29 ug/L may indicate Vitamin D insufficiency   and values <20 ug/L may indicate Vitamin D deficiency.  Vitamin D determination is routinely performed by an immunoassay specific for   25 hydroxyvitamin D3.  If an individual is on vitamin D2 (ergocalciferol)   supplementation, please specify 25 OH vitamin D2 and D3 level determination by   LCMSMS test VITD23.     Hemoglobin A1c   Result Value Ref Range    Hemoglobin A1C 6.5 (H) 0 - 5.6  %      Comment:      Normal <5.7% Prediabetes 5.7-6.4%  Diabetes 6.5% or higher - adopted from ADA   consensus guidelines.     Comprehensive metabolic panel   Result Value Ref Range    Sodium 140 133 - 144 mmol/L    Potassium 4.0 3.4 - 5.3 mmol/L    Chloride 106 94 - 109 mmol/L    Carbon Dioxide 30 20 - 32 mmol/L    Anion Gap 4 3 - 14 mmol/L    Glucose 94 70 - 99 mg/dL    Urea Nitrogen 15 7 - 30 mg/dL    Creatinine 0.84 0.66 - 1.25 mg/dL    GFR Estimate >90 >60 mL/min/[1.73_m2]      Comment:      Non  GFR Calc  Starting 12/18/2018, serum creatinine based estimated GFR (eGFR) will be   calculated using the Chronic Kidney Disease Epidemiology Collaboration   (CKD-EPI) equation.      GFR Estimate If Black >90 >60 mL/min/[1.73_m2]      Comment:       GFR Calc  Starting 12/18/2018, serum creatinine based estimated GFR (eGFR) will be   calculated using the Chronic Kidney Disease Epidemiology Collaboration   (CKD-EPI) equation.      Calcium 9.0 8.5 - 10.1 mg/dL    Bilirubin Total 0.4 0.2 - 1.3 mg/dL    Albumin 3.9 3.4 - 5.0 g/dL    Protein Total 7.8 6.8 - 8.8 g/dL    Alkaline Phosphatase 77 40 - 150 U/L    ALT 55 0 - 70 U/L    AST 21 0 - 45 U/L   CBC with platelets   Result Value Ref Range    WBC 9.8 4.0 - 11.0 10e9/L    RBC Count 5.28 4.4 - 5.9 10e12/L    Hemoglobin 14.2 13.3 - 17.7 g/dL    Hematocrit 46.2 40.0 - 53.0 %    MCV 88 78 - 100 fl    MCH 26.9 26.5 - 33.0 pg    MCHC 30.7 (L) 31.5 - 36.5 g/dL    RDW 14.5 10.0 - 15.0 %    Platelet Count 261 150 - 450 10e9/L     October 4, 2019      TO: Bill Wisdom  [address]  [place] [address]         Dear Mr. Bill Wisdom,    Here are the lab results you have requested.      Sincerely,    Jose Luis Cruz RN

## 2019-10-02 NOTE — NURSING NOTE
"Chief Complaint   Patient presents with     Consult     New bariatric consult.       Vitals:    10/02/19 0720   BP: 130/71   BP Location: Left arm   Patient Position: Sitting   Cuff Size: Adult Large   Pulse: 58   Temp: 97.8  F (36.6  C)   TempSrc: Oral   SpO2: 97%   Weight: 132.8 kg (292 lb 12.8 oz)   Height: 1.702 m (5' 7\")       Body mass index is 45.86 kg/m .             Brittany Stoner CMA    "

## 2019-10-02 NOTE — PATIENT INSTRUCTIONS
"Nutrition Goals  Relating To Eating:  Eat slowly (20-30 minutes per meal), chewing foods well (25 chews per bite/applesauce consistency)  9\" Plate method (1/2 plate non-starchy vegetables/fruit, 1/4 plate lean protein, 1/4 plate whole grain starch - no more than 1/2 cup carb/meal)  Eat 3 meals per day  Avoid snacking    Relating to beverages:  Separate fluids from meals by 30 minutes before, during, and after eating  Avoid calorie-containing beverages  Drink 48-64 ounces of fluid per day    Relating to dietary supplements:  Start a multivitamin containing iron daily    Relating to activity:  Increase activity as able    Relating to cravings:  Practice alternatives to emotion eating    Follow-up with RD in one month.    Lucretia Garay, ALTAF, LD  If you would like to schedule or reschedule an appointment with the RD, please call [phone]    "

## 2019-10-02 NOTE — PROGRESS NOTES
"New Bariatric Nutrition Consultation Note    Reason For Visit: Nutrition Assessment    Bill Wisdom is a 49 year old presenting today for new bariatric nutrition consult.   Pt is interested in laparoscopic sleeve gastrectomy with Dr. Billy expected surgery in Jan 2020.  Patient is accompanied by self.  This is pt's first of first of 3 required nutrition visits prior to surgery.     Pt referred by MEGA Guerra on October 2, 2019.  Patient with Co-morbidities of obesity including:   Dyslipidemia yes  Joint pain yes  Back pain yes  GERD yes     Support System Reviewed With Patient 10/2/2019   Who do you have in your support network that can be available to help you for the first 2 weeks after surgery? girlfriend   Who can you count on for support throughout your weight loss surgery journey? girlfriend   Reason for wanting weight loss surgery: \"Wants to be able to have energy, and exercise/move without pain\"    ANTHROPOMETRICS:  Estimated body mass index is 45.86 kg/m  as calculated from the following:    Height as of an earlier encounter on 10/2/19: 1.702 m (5' 7\").    Weight as of an earlier encounter on 10/2/19: 132.8 kg (292 lb 12.8 oz).    Required weight loss goal pre-op: 10 lbs from initial consult weight (goal weight 282 lbs or less before surgery)       10/2/2019   I have tried the following methods to lose weight Exercise, Atkins type diet (low carb/high protein), Physician directed program       Weight Loss Questions Reviewed With Patient 10/2/2019   How long have you been overweight? Since late 20's to early 40's       SUPPLEMENT INFORMATION:  Pt states he is not taking any vitamins/minerals at this time.     NUTRITION HISTORY:  Recall Diet Questions Reviewed With Patient 10/2/2019   Describe what you typically consume for breakfast (typical or most recent): Grits, hash browns,Turkey sausage,eggs, banana; cold cereal    Describe what you typically consume for lunch (typical or most recent): Hamburger; " "meat and potato/chips;   Describe what you typically consume for supper (typical or most recent): Chicken + potato;    Describe what you typically consume as snacks (typical or most recent): Chips, cookies - all day   How many ounces of water, or other low calorie drinks, do you drink daily (8 oz=1 glass)? 24 oz - water   How many ounces of caffeine (coffee, tea, pop) do you drink daily (8 oz=1 glass)? 8 oz   How many ounces of carbonated (pop, beer, sparkling water) drinks do you drinky daily (8 oz=1 glass)? 8 oz   How many ounces of juice, pop, sweet tea, sports drinks, protein drinks, other sweetened drinks, do you drink daily (8 oz=1 glass)? 8 oz    How many ounces of milk do you drink daily (8 oz=1 glass) 8 oz - 16oz   Please indicate the type of milk: 2% or skim   How often do you drink alcohol? Never       Eating Habits 10/2/2019   Do you have any dietary restrictions? No   Do you currently binge eat (eat a large amount of food in a short time)? Yes   Are you an emotional eater? Yes   Do you get up to eat after falling asleep? Yes   What foods do you crave? none       ADDITIONAL INFORMATION:  - 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 Since then pt has gained 50 lbs. Pt finds it difficult to go to grocery store and avoid fried foods in deli. Pt recognizes that he has developed emotional eating habits. Eating to find pleasure, and not necessarily hungry.   - Can receive \"Mom's meals,\" home-delivered meals.   - Works for [employer] - snacks abundant at work.   - Was trying to drink green smoothie with PB in the morning -  But felt like it wasn't enough.     Dining Out History Reviewed With Patient 10/2/2019   How often do you dine out? Nearly every day.   Where do you dine out? (select all that apply) fast food chains- [employer], [employer], chinese buffet   What types of food do you order when you dine out? chicken       Physical Activity Reviewed With Patient 10/2/2019   How often do " "you exercise? Less than 1 time per week   What is the duration of your exercise (in minutes)? 10 Minutes    What types of exercise do you do? walking, gym membership   What keeps you from being more active?  I am as active as I can possbily be, Pain, Shortness of breath, Lack of Time, Too tired     MALNUTRITION  % Intake: No decreased intake noted  % Weight Loss: None noted  Subcutaneous Fat Loss: None observed  Muscle Loss: None observed  Fluid Accumulation/Edema: None noted  Malnutrition Diagnosis: Patient does not meet two of the above criteria necessary for diagnosing malnutrition    NUTRITION DIAGNOSIS:  Obesity r/t long history of self-monitoring deficit and excessive energy intake aeb BMI >30 kg/m2.    INTERVENTION:  Intervention Provided/Education Provided on post-op diet guidelines, vitamins/minerals essential post-operatively, GI anatomy of bariatric surgeries, ways to help prepare for post-op diet guidelines pre-operatively, portion/calorie-control, mindful eating and sources of protein. Reviewed that surgery is a tool, and long-term success requires life-long dietary/behavioral changes. Pt is very eager to make positive changes. Provided pt with list of goals RD contact information.      Questions Reviewed With Patient 10/2/2019   How ready are you to make changes regarding your weight? Number 1 = Not ready at all to make changes up to 10 = very ready. 10   How confident are you that you can change? 1 = Not confident that you will be successful making changes up to 10 = very confident. 10       Patient Understanding: good  Expected Compliance: good    GOALS:  Relating To Eating:  - Eat slowly (20-30 minutes per meal), chewing foods well (25 chews per bite/applesauce consistency)  - Eat 3 meals per day. Use 9\" Plate method (1/2 plate non-starchy vegetables/fruit, 1/4 plate lean protein, 1/4 plate whole grain starch - no more than 1/2 cup carb/meal)  - Avoid snacking    Relating to beverages:  - Separate " fluids from meals by 30 minutes before, during, and after eating  - Avoid calorie-containing beverages  - Drink 48-64 ounces of fluid per day    Relating to dietary supplements:  - Start a multivitamin containing iron daily    Relating to activity:  - Increase activity as able    Relating to cravings:  - Practice alternatives to emotion eating      Time spent with patient: 30 minutes.  Lucretia Garay RD, LD

## 2019-10-02 NOTE — TELEPHONE ENCOUNTER
Patient called requesting an appointment with Dr. Major for psychological evaluation prior to bariatric surgery.   Scheduled Thursday, December 19 at 9 am. Instructed to take elevator to the 3rd floor of the clinic building and to check in at any of the freestanding check-in areas. They will direct him to the room where he will take the MMPI and when he is done with that Dr. Major will come in to discuss with him. I did tell him to plan on 2 hours for the visit.

## 2019-10-02 NOTE — PROGRESS NOTES
"New Bariatric Surgery Consultation Note    2019    RE: Bill Wisdom  MR#: [id number]  : 1970      Referring provider:       10/2/2019   Who referred you? my Doctor       Chief Complaint/Reason for visit: evaluation for possible weight loss surgery    Dear Mary, Amalia Greer, JUNIOR CNP (General),    I had the pleasure of seeing your patient, Bill Wisdom, to evaluate his obesity and consider him for possible weight loss surgery. As you know, Bill Wisdom is 49 year old.  He has a height of 5' 7\", a weight of 292 lbs 12.8 oz, and calculated Body mass index is 45.86 kg/m .    Injured back in  at work.  He just recently went back to work this year 4 hrs per day as a , [facility]    Saw Dr Billy for possible weight loss surgery but then moved to [place]. He moved back to [place] 5 months ago because family lives here.  Lived in a shelter for 2 months when he moved back to [place] 5 months ago.  Wants to lose weight to help with his back pain.   New PCP Amalia Hernandez  [place] [facility] [place] [facility]  COPD, quit smoking 2019  Quit drinking 2019  Hx of drug abuse cocaine over the years intermittently. Also stopped on his own completely 2019. No hx of requiring drug or alcohol treatment in the past      HISTORY OF PRESENT ILLNESS:  Weight Loss History Reviewed with Patient 10/2/2019   How long have you been overweight? Since late 20's to early 40's   What is the most that you have ever weighed? 293   What is the most weight you have lost? 20   I have tried the following methods to lose weight Exercise, Atkins type diet (low carb/high protein), Physician directed program   I have tried the following weight loss medications? (Check all that apply) None   Have you ever had weight loss surgery? No       CO-MORBIDITIES OF OBESITY INCLUDE:     10/2/2019   I have the following co-morbidities associated with obesity: Sleep Apnea, Weight Bearing Joint Pain   Do you use a CPAP? Yes   Tries to use " CPAP regularly but pulls it off in his sleep.  Hasn't been to sleep clinic since moving back to [place]  Heartburn with certain types of foods    PAST MEDICAL HISTORY:  Past Medical History:   Diagnosis Date     COPD (chronic obstructive pulmonary disease) (H)      Gastro-oesophageal reflux disease      History of tobacco abuse      Obese      Prediabetes        PAST SURGICAL HISTORY:  Past Surgical History:   Procedure Laterality Date     HYDROCELECTOMY SCROTAL  2006     INJECT SACROILIAC JOINT Right 9/16/2019    Procedure: Right Sacroiliac Joint Injection;  Surgeon: Sina Laboy MD;  Location: [facility] OR     SPERMATOCELECTOMY  3/20/2012    Procedure:SPERMATOCELECTOMY; Left Spermatocelectomy; Surgeon:DELLA CROW; Location:[facility] OR       FAMILY HISTORY:   Family History   Problem Relation Age of Onset     C.A.D. Mother         triple bypass     Hypertension Father      Cerebrovascular Disease Father      Alcohol/Drug Sister        SOCIAL HISTORY:   Social History Questions Reviewed With Patient 10/2/2019   Which best describes your employment status (select all that apply) I work part-time, I work evenings, I am disabled   If you work, what is your occupation?    Which best describes your marital status: in a relationship   Do you have children? Yes   Who do you have in your support network that can be available to help you for the first 2 weeks after surgery? girlfriend   Who can you count on for support throughout your weight loss surgery journey? girlfriend   Can you afford 3 meals a day?  Yes   Can you afford 50-60 dollars a month for vitamins? No       HABITS:     10/2/2019   How often do you drink alcohol? Never   Have you ever used any of the following nicotine products? Cigarettes   If you previously used any of these products, what year did you quit? 2019   Have you or are you currently using street drugs or prescription strength medication for which you do not have a prescription for? No   Do  you have a history of chemical dependency (alcohol or drug abuse)? No   Quit smoking, alcohol and cocaine in Jan 2019    PSYCHOLOGICAL HISTORY:   Psychological History Reviewed With Patient 10/2/2019   Have you ever attempted suicide? Never.   Have you had thoughts of suicide in the past year? No   Have you ever been hospitalized for mental illness or a suicide attempt? Never.   Do you have a history of chronic pain? Yes   Have you ever been diagnosed with fibromyalgia? No   Are you currently being treated for any of the following? (select all that apply) Depression, Anxiety   Are you currently seeing a therapist or counselor?  Yes   Are you currently seeing a psychiatrist? Yes       ROS:     10/2/2019   Skin:  Skin fold rashes (groin or other folds)   HEENT: Teeth, dentures, or bridges needing repairs   Musculoskeletal: Joint Pain, Back pain, Arthritis   Cardiovascular: Shortness of breath with activity   Pulmonary: Shortness of breath at rest, Shortness of breath with activity, Snoring   Gastrointestinal: Heartburn, Reflux   Genitourinary: Stress incontinence (losing urine when coughing, sneezing, etc.)   Hematological: None of the above   Neurological: None of the above       EATING BEHAVIORS:     10/2/2019   Have you or anyone else thought that you had an eating disorder? No   Do you currently binge eat (eat a large amount of food in a short time)? Yes   Are you an emotional eater? Yes   Do you get up to eat after falling asleep? Yes       EXERCISE:     10/2/2019   How often do you exercise? Less than 1 time per week   What is the duration of your exercise (in minutes)? 10 Minutes   What types of exercise do you do? walking, gym membership   What keeps you from being more active?  I am as active as I can possbily be, Pain, Shortness of breath, Lack of Time, Too tired       MEDICATIONS:  Current Outpatient Medications   Medication Sig Dispense Refill     acetaminophen (TYLENOL) 500 MG tablet Take 1-2 tablets  "(500-1,000 mg) by mouth every 6 hours as needed for mild pain or fever 90 tablet 1     albuterol (ACCUNEB) 1.25 MG/3ML neb solution Take 1.25 mg by nebulization every 6 hours as needed for shortness of breath / dyspnea or wheezing       albuterol (PROVENTIL HFA) 108 (90 Base) MCG/ACT inhaler Inhale 2 puffs into the lungs every 6 hours as needed for shortness of breath / dyspnea or wheezing 8.5 g 3     CRANBERRY        famotidine (PEPCID) 20 MG tablet Take 20 mg by mouth       FISH OIL        ibuprofen (ADVIL/MOTRIN) 600 MG tablet Take 1 tablet (600 mg) by mouth 4 times daily as needed for moderate pain 120 tablet 1     MILK THISTLE        naproxen (NAPROSYN) 500 MG tablet Take 1 tablet (500 mg) by mouth 2 times daily as needed for moderate pain 60 tablet 1     ondansetron (ZOFRAN ODT) 4 MG ODT tab 1-2 tab dissolve on tongue 4 times daily as needed for nausea, max daily dose 4 tab       order for DME Equipment being ordered: Cane 1 Device 0     DULoxetine (CYMBALTA) 30 MG capsule Take 1 capsule (30 mg) by mouth daily 30 capsule 1       ALLERGIES:  No Known Allergies    LABS/IMAGING/MEDICAL RECORDS REVIEW:         PHYSICAL EXAM:  /71 (BP Location: Left arm, Patient Position: Sitting, Cuff Size: Adult Large)   Pulse 58   Temp 97.8  F (36.6  C) (Oral)   Ht 1.702 m (5' 7\")   Wt 132.8 kg (292 lb 12.8 oz)   SpO2 97%   BMI 45.86 kg/m    General: NAD  Neurologic: A & O x 3, gait normal  Head: normocephalic, atraumatic  HEENT: PERRL, EOMI.   Respiratory: respirations unlabored  Abdomen: Obese, Soft NT ND   Extremities: No LE swelling   Skin: warm and dry.  No rashes on exposed skin  Psychiatric: Mentation and Affect appear normal      In summary, Bill Wisdom has Class III obesity with a body mass index of Body mass index is 45.86 kg/m . kg/m2 and the comorbidities stated above. He completed an informational seminar and is a candidate for the laparoscopic gastric sleeve.  He has a history of tobacco, alcohol and " drug abuse and sober completely since Jan 2019. We discussed concerns about reoccurance after surgery and plan to wait for 1 full year of sobriety (Jan 2020) prior to weight loss surgery. We will need to make sure that PCP, therapist, psychiatrist and psych evaluator are all supportive of him having weight loss surgery after Jan 2020 prior to going forward. He now has a support system in place of mental health and medical providers now that he is back living in [place].     He will have to complete the following pre-requisites:  If you have not already watched our online seminar please go to [email or web address]    See dietitian in 1 month and monthly for 3 months    See [facility] pharmacist in 1 month for follow up topiramate (same day as dietitian)    See Anna Marie in 3 months to follow up on preop weight loss and weight loss medications    Bariatric labs today first floor    See Dr Billy in 1 month for PBS meet and greet to discuss possible surgery     Schedule bariatric psych eval as soon as possible, call to schedule from list given today. Call [phone] to help with scheduling if needed.    Interested in studies?  Yes,  AA study, message sent to Jazlyn    Is patient currently taking narcotic/opioid medications? NO    Letter from therapist [facility] Domonique Ramos [facility]    Letter from mental health NP Juju Piper PMP    Bariatric Task List  Status:  Is patient a candidate for bariatric surgery?:  patient is a candidate for bariatric surgery -     Cleared to schedule surgeon consult?:    -     Status:  surgery evaluation in process -     Surgeon: Mariajose -     Tentative surgery month/year: Jan 2020 -        Insurance: Insurance:  [facility] [place] -        Patient Info: Initial Weight:  292 -     Date of Initial Weight/Height:  10/2/2019 -     Goal Weight (lbs):  282 -     Required Weight Loss:  10 -     Surgery Type:  sleeve gastrectomy -        Dietician Visits: Structured weight loss required by  "insurance?:  structured weight loss required -     Dietician Visit 1:  Completed -     Dietician Visit 2:  Needed -     Dietician Visit 3:  Needed -        Psychological Evaluation: Psych eval:  Needed -     Therapist letter of support:  Needed -     Psychiatrist letter of support:  Needed -        Lab Work: Complete Blood Count:  Needed -     Comprehensive Metabolic Panel:  Needed -     Vitamin D:  Needed -     Hgb A1c:  Needed -     PTH:  Needed -     Nicotine Testing:  Needed -        Consults/ Clearance: Sleep Medicine:  Needed -  Referral entered today   Medical Weight Management: Needed - started topiramate with Anna Marie 10/2/19 ramp to 75mg   Physical Therapy/Exercise:  Needed -        Testing:    PCP: Establish care with PCP:  Completed -     PCP letter of support:  Needed -        Smoking: Quit tobacco use (3 months smoke free)?:  Completed -     Quit date:  1/1/2019 -        Patient Education:  Information Session:  Completed -     Given \"Making your decision\" handout?:  Given \"\"Making your decision\"\" handout? -     Given support group information?:  support group contact information provided -     Attended support group?:    -     Support plan in place?:  Completed -     Research consents signed?:  research consent signed -        Final Tasks:  Before surgery online class:  Needed -     Before surgery online class website link:  [email or web address]   After surgery online class:  Needed -     After surgery online class website link:  [email or web address]   Nurse visit for weigh-in and information:  Needed -     Pre-assessment clinic visit with anesthesia team for H&P:  Needed -     Final labs (Hgb, plt, T&S, UA):  Needed -        Notes:   -         Today in the office we discussed gastric sleeve surgery. Preoperative, perioperative, and postoperative processes, management, and follow up were addressed.  Risks and benefits were outlined including the risk of death, staple " line leak (1-2%), PE, DVT, ulcer, worsening GERD, N/V, stricture, hernia, wound infection, weight regain, and vitamin deficiencies. I emphasized exercise and activity along with appropriate food choice as the main foundation for weight loss with surgery providing surgical reinforcement of this.  All questions were answered.  A goal sheet and support group handout were given to the patient.        Once the patient has completed the requirements in their task list and there are no further recommendations, the pt will be allowed to see the surgeon of her choice for consultation on the laparoscopic gastric sleeve surgery. Patient verbalizes understanding of the process to surgery and expectations for the postoperative period including the need for lifelong lifestyle changes, vitamin supplementation, and laboratory monitoring.      Sincerely,     Anna Marie Bejarano PA-C    I spent a total of 45 minutes face to face with the patient during today's office visit. Over 50% of this time was spent counseling the patient and/or coordinating care.

## 2019-10-02 NOTE — LETTER
"10/2/2019       RE: Bill Wisdom  [address]  [facility] [address]     Dear Colleague,    Thank you for referring your patient, Bill Wisdom, to the [facility] at [facility]. Please see a copy of my visit note below.    New Bariatric Nutrition Consultation Note    Reason For Visit: Nutrition Assessment    Bill Wisdom is a 49 year old presenting today for new bariatric nutrition consult.   Pt is interested in laparoscopic sleeve gastrectomy with Dr. Billy expected surgery in Jan 2020.  Patient is accompanied by self.  This is pt's first of first of 3 required nutrition visits prior to surgery.     Pt referred by MEGA Guerra on October 2, 2019.  Patient with Co-morbidities of obesity including:   Dyslipidemia yes  Joint pain yes  Back pain yes  GERD yes     Support System Reviewed With Patient 10/2/2019   Who do you have in your support network that can be available to help you for the first 2 weeks after surgery? girlfriend   Who can you count on for support throughout your weight loss surgery journey? girlfriend   Reason for wanting weight loss surgery: \"Wants to be able to have energy, and exercise/move without pain\"    ANTHROPOMETRICS:  Estimated body mass index is 45.86 kg/m  as calculated from the following:    Height as of an earlier encounter on 10/2/19: 1.702 m (5' 7\").    Weight as of an earlier encounter on 10/2/19: 132.8 kg (292 lb 12.8 oz).    Required weight loss goal pre-op: 10 lbs from initial consult weight (goal weight 282 lbs or less before surgery)       10/2/2019   I have tried the following methods to lose weight Exercise, Atkins type diet (low carb/high protein), Physician directed program       Weight Loss Questions Reviewed With Patient 10/2/2019   How long have you been overweight? Since late 20's to early 40's       SUPPLEMENT INFORMATION:  Pt states he is not taking any vitamins/minerals at this time. " "    NUTRITION HISTORY:  Recall Diet Questions Reviewed With Patient 10/2/2019   Describe what you typically consume for breakfast (typical or most recent): Grits, hash browns,Turkey sausage,eggs, banana; cold cereal    Describe what you typically consume for lunch (typical or most recent): Hamburger; meat and potato/chips;   Describe what you typically consume for supper (typical or most recent): Chicken + potato;    Describe what you typically consume as snacks (typical or most recent): Chips, cookies - all day   How many ounces of water, or other low calorie drinks, do you drink daily (8 oz=1 glass)? 24 oz - water   How many ounces of caffeine (coffee, tea, pop) do you drink daily (8 oz=1 glass)? 8 oz   How many ounces of carbonated (pop, beer, sparkling water) drinks do you drinky daily (8 oz=1 glass)? 8 oz   How many ounces of juice, pop, sweet tea, sports drinks, protein drinks, other sweetened drinks, do you drink daily (8 oz=1 glass)? 8 oz    How many ounces of milk do you drink daily (8 oz=1 glass) 8 oz - 16oz   Please indicate the type of milk: 2% or skim   How often do you drink alcohol? Never       Eating Habits 10/2/2019   Do you have any dietary restrictions? No   Do you currently binge eat (eat a large amount of food in a short time)? Yes   Are you an emotional eater? Yes   Do you get up to eat after falling asleep? Yes   What foods do you crave? none       ADDITIONAL INFORMATION:  - 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 Since then pt has gained 50 lbs. Pt finds it difficult to go to grocery store and avoid fried foods in deli. Pt recognizes that he has developed emotional eating habits. Eating to find pleasure, and not necessarily hungry.   - Can receive \"Mom's meals,\" home-delivered meals.   - Works for [employer] - snacks abundant at work.   - Was trying to drink green smoothie with PB in the morning -  But felt like it wasn't enough.     Dining Out History " Reviewed With Patient 10/2/2019   How often do you dine out? Nearly every day.   Where do you dine out? (select all that apply) fast food chains- [employer], [employer], chinese buffet   What types of food do you order when you dine out? chicken       Physical Activity Reviewed With Patient 10/2/2019   How often do you exercise? Less than 1 time per week   What is the duration of your exercise (in minutes)? 10 Minutes    What types of exercise do you do? walking, gym membership   What keeps you from being more active?  I am as active as I can possbily be, Pain, Shortness of breath, Lack of Time, Too tired     MALNUTRITION  % Intake: No decreased intake noted  % Weight Loss: None noted  Subcutaneous Fat Loss: None observed  Muscle Loss: None observed  Fluid Accumulation/Edema: None noted  Malnutrition Diagnosis: Patient does not meet two of the above criteria necessary for diagnosing malnutrition    NUTRITION DIAGNOSIS:  Obesity r/t long history of self-monitoring deficit and excessive energy intake aeb BMI >30 kg/m2.    INTERVENTION:  Intervention Provided/Education Provided on post-op diet guidelines, vitamins/minerals essential post-operatively, GI anatomy of bariatric surgeries, ways to help prepare for post-op diet guidelines pre-operatively, portion/calorie-control, mindful eating and sources of protein. Reviewed that surgery is a tool, and long-term success requires life-long dietary/behavioral changes. Pt is very eager to make positive changes. Provided pt with list of goals RD contact information.      Questions Reviewed With Patient 10/2/2019   How ready are you to make changes regarding your weight? Number 1 = Not ready at all to make changes up to 10 = very ready. 10   How confident are you that you can change? 1 = Not confident that you will be successful making changes up to 10 = very confident. 10       Patient Understanding: good  Expected Compliance: good    GOALS:  Relating To Eating:  - Eat slowly (20-30  "minutes per meal), chewing foods well (25 chews per bite/applesauce consistency)  - Eat 3 meals per day. Use 9\" Plate method (1/2 plate non-starchy vegetables/fruit, 1/4 plate lean protein, 1/4 plate whole grain starch - no more than 1/2 cup carb/meal)  - Avoid snacking    Relating to beverages:  - Separate fluids from meals by 30 minutes before, during, and after eating  - Avoid calorie-containing beverages  - Drink 48-64 ounces of fluid per day    Relating to dietary supplements:  - Start a multivitamin containing iron daily    Relating to activity:  - Increase activity as able    Relating to cravings:  - Practice alternatives to emotion eating    Time spent with patient: 30 minutes.  Lucretia Garay RD, LD    Again, thank you for allowing me to participate in the care of your patient.      Sincerely,    Bee Walsh RD        "

## 2019-10-02 NOTE — LETTER
"10/2/2019       RE: Bill Wisdom  [address]  [facility] [address]     Dear Colleague,    Thank you for referring your patient, Bill Wisdom, to the [facility] SURGICAL WEIGHT MANAGEMENT at [facility]. Please see a copy of my visit note below.    New Bariatric Surgery Consultation Note    2019    RE: Bill Wisdom  MR#: [id number]  : 1970      Referring provider:       10/2/2019   Who referred you? my Doctor       Chief Complaint/Reason for visit: evaluation for possible weight loss surgery    Dear Mary, Amalia Greer, JUNIOR CNP (General),    I had the pleasure of seeing your patient, Bill Wisdom, to evaluate his obesity and consider him for possible weight loss surgery. As you know, Bill Wisdom is 49 year old.  He has a height of 5' 7\", a weight of 292 lbs 12.8 oz, and calculated Body mass index is 45.86 kg/m .    Injured back in  at work.  He just recently went back to work this year 4 hrs per day as a , also gets SSDI    Saw Dr Billy for possible weight loss surgery but then moved to [place]. He moved back to [place] 5 months ago because family lives here.  Lived in a shelter for 2 months when he moved back to [place] 5 months ago.  Wants to lose weight to help with his back pain.   New PCP Amalia Hernandez  Sees Pain clinic at [facility]  COPD, quit smoking 2019  Quit drinking 2019  Hx of drug abuse cocaine over the years intermittently. Also stopped on his own completely 2019. No hx of requiring drug or alcohol treatment in the past      HISTORY OF PRESENT ILLNESS:  Weight Loss History Reviewed with Patient 10/2/2019   How long have you been overweight? Since late 20's to early 40's   What is the most that you have ever weighed? 293   What is the most weight you have lost? 20   I have tried the following methods to lose weight Exercise, Atkins type diet (low carb/high protein), Physician directed program   I have tried the " following weight loss medications? (Check all that apply) None   Have you ever had weight loss surgery? No       CO-MORBIDITIES OF OBESITY INCLUDE:     10/2/2019   I have the following co-morbidities associated with obesity: Sleep Apnea, Weight Bearing Joint Pain   Do you use a CPAP? Yes   Tries to use CPAP regularly but pulls it off in his sleep.  Hasn't been to sleep clinic since moving back to [place]  Heartburn with certain types of foods    PAST MEDICAL HISTORY:  Past Medical History:   Diagnosis Date     COPD (chronic obstructive pulmonary disease) (H)      Gastro-oesophageal reflux disease      History of tobacco abuse      Obese      Prediabetes        PAST SURGICAL HISTORY:  Past Surgical History:   Procedure Laterality Date     HYDROCELECTOMY SCROTAL  2006     INJECT SACROILIAC JOINT Right 9/16/2019    Procedure: Right Sacroiliac Joint Injection;  Surgeon: Sina Laboy MD;  Location: [facility] OR     SPERMATOCELECTOMY  3/20/2012    Procedure:SPERMATOCELECTOMY; Left Spermatocelectomy; Surgeon:DELLA CROW; Location: OR       FAMILY HISTORY:   Family History   Problem Relation Age of Onset     C.A.D. Mother         triple bypass     Hypertension Father      Cerebrovascular Disease Father      Alcohol/Drug Sister        SOCIAL HISTORY:   Social History Questions Reviewed With Patient 10/2/2019   Which best describes your employment status (select all that apply) I work part-time, I work evenings, I am disabled   If you work, what is your occupation?    Which best describes your marital status: in a relationship   Do you have children? Yes   Who do you have in your support network that can be available to help you for the first 2 weeks after surgery? girlfriend   Who can you count on for support throughout your weight loss surgery journey? girlfriend   Can you afford 3 meals a day?  Yes   Can you afford 50-60 dollars a month for vitamins? No       HABITS:     10/2/2019   How often do you drink  alcohol? Never   Have you ever used any of the following nicotine products? Cigarettes   If you previously used any of these products, what year did you quit? 2019   Have you or are you currently using street drugs or prescription strength medication for which you do not have a prescription for? No   Do you have a history of chemical dependency (alcohol or drug abuse)? No   Quit smoking, alcohol and cocaine in Jan 2019    PSYCHOLOGICAL HISTORY:   Psychological History Reviewed With Patient 10/2/2019   Have you ever attempted suicide? Never.   Have you had thoughts of suicide in the past year? No   Have you ever been hospitalized for mental illness or a suicide attempt? Never.   Do you have a history of chronic pain? Yes   Have you ever been diagnosed with fibromyalgia? No   Are you currently being treated for any of the following? (select all that apply) Depression, Anxiety   Are you currently seeing a therapist or counselor?  Yes   Are you currently seeing a psychiatrist? Yes       ROS:     10/2/2019   Skin:  Skin fold rashes (groin or other folds)   HEENT: Teeth, dentures, or bridges needing repairs   Musculoskeletal: Joint Pain, Back pain, Arthritis   Cardiovascular: Shortness of breath with activity   Pulmonary: Shortness of breath at rest, Shortness of breath with activity, Snoring   Gastrointestinal: Heartburn, Reflux   Genitourinary: Stress incontinence (losing urine when coughing, sneezing, etc.)   Hematological: None of the above   Neurological: None of the above       EATING BEHAVIORS:     10/2/2019   Have you or anyone else thought that you had an eating disorder? No   Do you currently binge eat (eat a large amount of food in a short time)? Yes   Are you an emotional eater? Yes   Do you get up to eat after falling asleep? Yes       EXERCISE:     10/2/2019   How often do you exercise? Less than 1 time per week   What is the duration of your exercise (in minutes)? 10 Minutes   What types of exercise do you  "do? walking, gym membership   What keeps you from being more active?  I am as active as I can possbily be, Pain, Shortness of breath, Lack of Time, Too tired       MEDICATIONS:  Current Outpatient Medications   Medication Sig Dispense Refill     acetaminophen (TYLENOL) 500 MG tablet Take 1-2 tablets (500-1,000 mg) by mouth every 6 hours as needed for mild pain or fever 90 tablet 1     albuterol (ACCUNEB) 1.25 MG/3ML neb solution Take 1.25 mg by nebulization every 6 hours as needed for shortness of breath / dyspnea or wheezing       albuterol (PROVENTIL HFA) 108 (90 Base) MCG/ACT inhaler Inhale 2 puffs into the lungs every 6 hours as needed for shortness of breath / dyspnea or wheezing 8.5 g 3     CRANBERRY        famotidine (PEPCID) 20 MG tablet Take 20 mg by mouth       FISH OIL        ibuprofen (ADVIL/MOTRIN) 600 MG tablet Take 1 tablet (600 mg) by mouth 4 times daily as needed for moderate pain 120 tablet 1     MILK THISTLE        naproxen (NAPROSYN) 500 MG tablet Take 1 tablet (500 mg) by mouth 2 times daily as needed for moderate pain 60 tablet 1     ondansetron (ZOFRAN ODT) 4 MG ODT tab 1-2 tab dissolve on tongue 4 times daily as needed for nausea, max daily dose 4 tab       order for DME Equipment being ordered: Cane 1 Device 0     DULoxetine (CYMBALTA) 30 MG capsule Take 1 capsule (30 mg) by mouth daily 30 capsule 1       ALLERGIES:  No Known Allergies    LABS/IMAGING/MEDICAL RECORDS REVIEW:         PHYSICAL EXAM:  /71 (BP Location: Left arm, Patient Position: Sitting, Cuff Size: Adult Large)   Pulse 58   Temp 97.8  F (36.6  C) (Oral)   Ht 1.702 m (5' 7\")   Wt 132.8 kg (292 lb 12.8 oz)   SpO2 97%   BMI 45.86 kg/m     General: NAD  Neurologic: A & O x 3, gait normal  Head: normocephalic, atraumatic  HEENT: PERRL, EOMI.   Respiratory: respirations unlabored  Abdomen: Obese, Soft NT ND   Extremities: No LE swelling   Skin: warm and dry.  No rashes on exposed skin  Psychiatric: Mentation and Affect " appear normal      In summary, Bill Wisdom has Class III obesity with a body mass index of Body mass index is 45.86 kg/m . kg/m2 and the comorbidities stated above. He completed an informational seminar and is a candidate for the laparoscopic gastric sleeve.  He has a history of tobacco, alcohol and drug abuse and sober completely since Jan 2019. We discussed concerns about reoccurance after surgery and plan to wait for 1 full year of sobriety (Jan 2020) prior to weight loss surgery. We will need to make sure that PCP, therapist, psychiatrist and psych evaluator are all supportive of him having weight loss surgery after Jan 2020 prior to going forward. He now has a support system in place of mental health and medical providers now that he is back living in [place].     He will have to complete the following pre-requisites:  If you have not already watched our online seminar please go to [email or web address]    See dietitian in 1 month and monthly for 3 months    See [facility]TM pharmacist in 1 month for follow up topiramate (same day as dietitian)    See Anna Marie in 3 months to follow up on preop weight loss and weight loss medications    Bariatric labs today first floor    See Dr Billy in 1 month for PBS meet and greet to discuss possible surgery     Schedule bariatric psych eval as soon as possible, call to schedule from list given today. Call [phone] to help with scheduling if needed.    Interested in studies?  Yes,  AA study, message sent to Jazlyn    Is patient currently taking narcotic/opioid medications? NO    Letter from therapist [facility] Domonique Ramos [facility]    Letter from mental health NP Juju Piper PMP    Bariatric Task List  Status:  Is patient a candidate for bariatric surgery?:  patient is a candidate for bariatric surgery -     Cleared to schedule surgeon consult?:    -     Status:  surgery evaluation in process -     Surgeon: Mariajose -     Tentative surgery month/year: Jan 2020 -    "     Insurance: Insurance:  [facility] [place] -        Patient Info: Initial Weight:  292 -     Date of Initial Weight/Height:  10/2/2019 -     Goal Weight (lbs):  282 -     Required Weight Loss:  10 -     Surgery Type:  sleeve gastrectomy -        Dietician Visits: Structured weight loss required by insurance?:  structured weight loss required -     Dietician Visit 1:  Completed -     Dietician Visit 2:  Needed -     Dietician Visit 3:  Needed -        Psychological Evaluation: Psych eval:  Needed -     Therapist letter of support:  Needed -     Psychiatrist letter of support:  Needed -        Lab Work: Complete Blood Count:  Needed -     Comprehensive Metabolic Panel:  Needed -     Vitamin D:  Needed -     Hgb A1c:  Needed -     PTH:  Needed -     Nicotine Testing:  Needed -        Consults/ Clearance: Sleep Medicine:  Needed -  Referral entered today   Medical Weight Management: Needed - started topiramate with Anna Marie 10/2/19 ramp to 75mg   Physical Therapy/Exercise:  Needed -        Testing:    PCP: Establish care with PCP:  Completed -     PCP letter of support:  Needed -        Smoking: Quit tobacco use (3 months smoke free)?:  Completed -     Quit date:  1/1/2019 -        Patient Education:  Information Session:  Completed -     Given \"Making your decision\" handout?:  Given \"\"Making your decision\"\" handout? -     Given support group information?:  support group contact information provided -     Attended support group?:    -     Support plan in place?:  Completed -     Research consents signed?:  research consent signed -        Final Tasks:  Before surgery online class:  Needed -     Before surgery online class website link:  [email or web address]   After surgery online class:  Needed -     After surgery online class website link:  [email or web address]   Nurse visit for weigh-in and information:  Needed -     Pre-assessment clinic visit with anesthesia team for H&P:  Needed -   "   Final labs (Hgb, plt, T&S, UA):  Needed -        Notes:   -         Today in the office we discussed gastric sleeve surgery. Preoperative, perioperative, and postoperative processes, management, and follow up were addressed.  Risks and benefits were outlined including the risk of death, staple line leak (1-2%), PE, DVT, ulcer, worsening GERD, N/V, stricture, hernia, wound infection, weight regain, and vitamin deficiencies. I emphasized exercise and activity along with appropriate food choice as the main foundation for weight loss with surgery providing surgical reinforcement of this.  All questions were answered.  A goal sheet and support group handout were given to the patient.      Once the patient has completed the requirements in their task list and there are no further recommendations, the pt will be allowed to see the surgeon of her choice for consultation on the laparoscopic gastric sleeve surgery. Patient verbalizes understanding of the process to surgery and expectations for the postoperative period including the need for lifelong lifestyle changes, vitamin supplementation, and laboratory monitoring.    Sincerely,     Anna Marie Bejarano PA-C    I spent a total of 45 minutes face to face with the patient during today's office visit. Over 50% of this time was spent counseling the patient and/or coordinating care.

## 2019-10-02 NOTE — PATIENT INSTRUCTIONS
If you have not already watched our online seminar please go to [email or web address]    See dietitian in 1 month and monthly for 3 months    See [facility] pharmacist in 1 month for follow up topiramate (same day as dietitian)    See Anna Marie in 3 months to follow up on preop weight loss and weight loss medications    See Dr Billy in 1 month for PBS meet and greet to discuss possible surgery     Bariatric labs today first floor    Schedule bariatric psych eval as soon as possible, call to schedule from list given today. Call [phone] to help with scheduling if needed.    Letter of support from therapist [facility] Domonique Ramos [facility]    Letter of support from mental health NP Juju Piper PMHNP    Bariatric Task List  Status:  Is patient a candidate for bariatric surgery?:  patient is a candidate for bariatric surgery -     Cleared to schedule surgeon consult?:    -     Status:  surgery evaluation in process -     Surgeon: Mariajose -     Tentative surgery month/year: Jan 2020 -        Insurance: Insurance:  [facility] [place] -        Patient Info: Initial Weight:  292 -     Date of Initial Weight/Height:  10/2/2019 -     Goal Weight (lbs):  282 -     Required Weight Loss:  10 -     Surgery Type:  sleeve gastrectomy -        Dietician Visits: Structured weight loss required by insurance?:  structured weight loss required -     Dietician Visit 1:  Completed -     Dietician Visit 2:  Needed -     Dietician Visit 3:  Needed -        Psychological Evaluation: Psych eval:  Needed -     Therapist letter of support:  Needed -     Psychiatrist letter of support:  Needed -        Lab Work: Complete Blood Count:  Needed -     Comprehensive Metabolic Panel:  Needed -     Vitamin D:  Needed -     Hgb A1c:  Needed -     PTH:  Needed -     Nicotine Testing:  Needed -        Consults/ Clearance: Sleep Medicine:  Needed -     Medical Weight Management: Needed - started topiramate with Anna Marie 10/2/19 ramp to 75mg   Physical  "Therapy/Exercise:  Needed -        Testing:    PCP: Establish care with PCP:  Completed -     PCP letter of support:  Needed -        Smoking: Quit tobacco use (3 months smoke free)?:  Completed -     Quit date:  1/1/2019 -        Patient Education:  Information Session:  Completed -     Given \"Making your decision\" handout?:  Given \"\"Making your decision\"\" handout? -     Given support group information?:  support group contact information provided -     Attended support group?:    -     Support plan in place?:  Completed -     Research consents signed?:  research consent signed -        Final Tasks:  Before surgery online class:  Needed -     Before surgery online class website link:  [email or web address]   After surgery online class:  Needed -     After surgery online class website link:  [email or web address]   Nurse visit for weigh-in and information:  Needed -     Pre-assessment clinic visit with anesthesia team for H&P:  Needed -     Final labs (Hgb, plt, T&S, UA):  Needed -        Notes:   -         MEDICATION STARTED AT THIS APPOINTMENT  We are starting topiramate at bedtime.  Start one tab, 25 mg, for a week. Go up to 50 mg (2 tabs) for the next week. At the third week, take   3 tabs (75 mg).  Stay at 3 tabs until you are seen again. Call the nurse at [phone] if you have any questions or concerns. (Do not stop taking it if you don't think it's working. For some people it works even though they do not feel much different.)    Topiramate (Topamax) is a medication that is used most often to treat migraine headaches or for seizures. It has also been found to help with weight loss. Although it's not currently FDA approved for weight loss, it has been used safely for a number of years to help people who are carrying extra weight.     Just how topiramate helps with weight loss has not been exactly determined. However it seems to work on areas of the brain to quiet down " signals related to eating.      Topiramate may make you:    >feel less interest in eating in between meals   >think less about food and eating   >find it easier to push the plate away   >find giving up pop easier    >have an easier time eating less    For some of our patients, the pills work right away. They feel and think quite differently about food. Other patients don't feel much of a change but find in fact they have lost weight! Like all weight loss medications, topiramate works best when you help it work.  This means:    1) Have less tempting high calorie (fattening) food around the house or office    2) Have lower calorie food (fruits, vegetables,low fat meats and dairy) for snacks    3) Eat out only one time or less each week.   4) Eat your meals at a table with the TV or computer off.    Side-effects. Topiramate is generally well tolerated. The main side-effects we see are:   Tingling in hands,feet, or face (usually not very troublesome)   Mental confusion and word finding trouble (about 10% of patients have this.)     Feeling sleepy or a bit dopey- this goes away very soon after starting.    One of the dangers of topiramate is the possibility of birth defects--if you get pregnant when you are on it, there is the risk that your baby will be born with a cleft lip or palate.  If you are on topiramate and of child bearing age, you need to be on a reliable form of birth control or refrain from sexual intercourse.     Please refer to the pharmacy insert for more information on side-effects. Since many pharmacists are not familiar with the use of topiramate in weight loss, calling the clinic will get you the most accurate information on the use of this medication for weight loss.     In order to get refills of this or any medication we prescribe you must be seen in the medical weight mgmt clinic every 2-3 months. Please have your pharmacy fax a refill request to [phone].

## 2019-10-02 NOTE — TELEPHONE ENCOUNTER
Called patient and left message to inform him of A1C result. Advised that Anna Marie Bejarano PA-C would like to add cardiology clearance to task list. Gave number to schedule and direct number back with questions. Sent clearance paperwork in the mail.

## 2019-10-04 NOTE — PROGRESS NOTES
Contacted patient via phone and relayed patient tests results. A1C came back in the diabetic range. Patient has hx of pre diabetes. He was told to continue to work on weight loss and when he meets with the pharmacist in 1 month both can discuss medications to address the diabetes and the desire to lose weight at that time. He was told that his lipids were elevated and he needed to follow up with his PCP on this results. He was also told that his Vitamin D level cam back at 8 and we wanted him to be between 20-75. It was recommended that he  Vitamin D3 5000 international units and he begin taking that daily. Patient asked for this information to be emailed to him as well as he was not in a position to write things down but wanted to make sure he picked up the right Vitmain D. These results along with the recommendations were emailed to his [employer] email after verification with patient. He was given my direct number to call in case he had questions or didn't receive the email.

## 2019-10-08 ENCOUNTER — APPOINTMENT (OUTPATIENT)
Dept: CT IMAGING | Facility: CLINIC | Age: 49
End: 2019-10-08
Attending: EMERGENCY MEDICINE
Payer: COMMERCIAL

## 2019-10-08 ENCOUNTER — HOSPITAL ENCOUNTER (EMERGENCY)
Facility: CLINIC | Age: 49
Discharge: HOME | End: 2019-10-08
Attending: EMERGENCY MEDICINE | Admitting: EMERGENCY MEDICINE
Payer: COMMERCIAL

## 2019-10-08 VITALS
TEMPERATURE: 98.1 F | RESPIRATION RATE: 16 BRPM | DIASTOLIC BLOOD PRESSURE: 88 MMHG | SYSTOLIC BLOOD PRESSURE: 148 MMHG | HEART RATE: 56 BPM | BODY MASS INDEX: 45.31 KG/M2 | OXYGEN SATURATION: 98 % | WEIGHT: 288.7 LBS | HEIGHT: 67 IN

## 2019-10-08 DIAGNOSIS — S09.90XA CLOSED HEAD INJURY, INITIAL ENCOUNTER: ICD-10-CM

## 2019-10-08 LAB — GLUCOSE BLDC GLUCOMTR-MCNC: 77 MG/DL (ref 70–99)

## 2019-10-08 PROCEDURE — 72125 CT NECK SPINE W/O DYE: CPT

## 2019-10-08 PROCEDURE — 99283 EMERGENCY DEPT VISIT LOW MDM: CPT | Mod: Z6 | Performed by: EMERGENCY MEDICINE

## 2019-10-08 PROCEDURE — 99284 EMERGENCY DEPT VISIT MOD MDM: CPT | Mod: 25 | Performed by: EMERGENCY MEDICINE

## 2019-10-08 PROCEDURE — 00000146 ZZHCL STATISTIC GLUCOSE BY METER IP

## 2019-10-08 PROCEDURE — 70450 CT HEAD/BRAIN W/O DYE: CPT

## 2019-10-08 RX ORDER — TRAZODONE HYDROCHLORIDE 100 MG/1
100 TABLET ORAL AT BEDTIME
COMMUNITY

## 2019-10-08 ASSESSMENT — ENCOUNTER SYMPTOMS
WEAKNESS: 0
FEVER: 0
DIZZINESS: 0
DIFFICULTY URINATING: 0
NECK PAIN: 1
COLOR CHANGE: 0
HEADACHES: 1
NUMBNESS: 0
LIGHT-HEADEDNESS: 0
PHOTOPHOBIA: 0
SHORTNESS OF BREATH: 0
EYE REDNESS: 0
CONFUSION: 0
SPEECH DIFFICULTY: 0
ARTHRALGIAS: 0
NECK STIFFNESS: 1
ABDOMINAL PAIN: 0

## 2019-10-08 ASSESSMENT — MIFFLIN-ST. JEOR: SCORE: 2123.16

## 2019-10-08 NOTE — ED PROVIDER NOTES
"  History     Chief Complaint   Patient presents with     Head Injury     Hit back head on wall last evening, does not remember blacking out, but had to stand there for awhile. Has been feeling dizzy and difficulty concentrating since. Newly diagnosed DM.      49-year-old male with history of newly diagnosed diabetes mellitus type 2, COPD, presents the emergency department for complaint of head injury.  He states he was in a rocking chair last night, he reclined too far in the chair tipped over.  He struck the back of his head on the wall and floor.  He denies any loss of consciousness.  He has since been having a diffuse headache that is worse in the posterior aspect of the head where it struck the ground.  Is also having diffuse neck pain.  He does comment that he has chronic neck pain and has had injections in his C5-C6 area in the past.  Symptoms have been constant since onset.  He rates the pain a 5 out of 10.  He presents the emergency department to make sure \"nothing is wrong\".  He denies any loss of consciousness, vision change, chest pain, shortness of breath, hallucinations, numbness/tingling, muscle weakness, and has no other medical complaints.            I have reviewed the Medications, Allergies, Past Medical and Surgical History, and Social History in the Epic system.    Review of Systems   Constitutional: Negative for fever.   HENT: Negative for congestion.    Eyes: Negative for photophobia, redness and visual disturbance.   Respiratory: Negative for shortness of breath.    Cardiovascular: Negative for chest pain.   Gastrointestinal: Negative for abdominal pain.   Genitourinary: Negative for difficulty urinating.   Musculoskeletal: Positive for neck pain and neck stiffness. Negative for arthralgias.   Skin: Negative for color change.   Neurological: Positive for headaches. Negative for dizziness, speech difficulty, weakness, light-headedness and numbness.   Psychiatric/Behavioral: Negative for " "confusion.       Physical Exam   BP: 137/77  Pulse: 56  Heart Rate: 58  Temp: 97.1  F (36.2  C)  Resp: 16  Height: 170.2 cm (5' 7\")  Weight: 131 kg (288 lb 11.2 oz)  SpO2: 97 %      Physical Exam  Constitutional:       General: He is not in acute distress.     Appearance: Normal appearance. He is obese. He is not ill-appearing, toxic-appearing or diaphoretic.   HENT:      Head: Normocephalic and atraumatic.      Right Ear: Tympanic membrane normal.      Mouth/Throat:      Mouth: Mucous membranes are moist.   Eyes:      General: No scleral icterus.     Pupils: Pupils are equal, round, and reactive to light.   Neck:      Musculoskeletal: Normal range of motion and neck supple. Muscular tenderness present.      Comments: Diffuse tenderness in the bilateral paraspinal musculature from C to to C6.  No obvious deformities.  No midline tenderness.  Does have full range of motion.  Cardiovascular:      Rate and Rhythm: Normal rate.      Heart sounds: Normal heart sounds.   Pulmonary:      Effort: No respiratory distress.      Breath sounds: Normal breath sounds.   Abdominal:      General: Bowel sounds are normal.      Palpations: Abdomen is soft.      Tenderness: There is no tenderness.   Musculoskeletal:         General: No tenderness.   Skin:     General: Skin is warm.      Findings: No rash.   Neurological:      General: No focal deficit present.      Mental Status: He is alert. He is disoriented.      Cranial Nerves: No cranial nerve deficit.      Sensory: No sensory deficit.      Motor: No weakness.   Psychiatric:         Mood and Affect: Mood normal.         Behavior: Behavior normal.         ED Course        Procedures               Labs Ordered and Resulted from Time of ED Arrival Up to the Time of Departure from the ED   GLUCOSE MONITOR NURSING POCT   GLUCOSE BY METER            Assessments & Plan (with Medical Decision Making)   Patient presented for evaluation of a head injury.  Is on a rocking chair last night, " "fell backwards and hit the back of his head and neck on the wall and floor.  On arrival, he has normal vital signs.  Is not particularly distressed.  His neurological exam is unremarkable.  He is having significant pain in the posterior of his head radiating down into his neck.  We obtain imaging of his head and C-spine which are negative.  His physical exam is not very impressive, I have a low suspicion for ligamentous injury requiring any further imaging.  He just wanted to \"make sure everything is okay\".  He also newly diagnosed diabetic so we did check his blood sugar which is in an acceptable range.  He is to follow-up with his PCP for management of his diabetes and will return to the emergency department if he develops any worsening head and neck symptoms.  The official reads of the CT scans will be followed up by the incoming provider who will disposition the patient accordingly.    I have reviewed the nursing notes.    I have reviewed the findings, diagnosis, plan and need for follow up with the patient.    Discharge Medication List as of 10/8/2019  9:07 PM          Final diagnoses:   Closed head injury, initial encounter       10/8/2019   [facility], [facility], [facility]     Jay Phillips,   10/09/19 0829    "

## 2019-10-08 NOTE — ED AVS SNAPSHOT
[facility], [place], Emergency Department  [address]  [place] [address]  Phone:  [phone]  Fax:  [phone]                                    Bill Wisdom   MRN: [id number]    Department:  [facility], Emergency Department   Date of Visit:  10/8/2019           After Visit Summary Signature Page    I have received my discharge instructions, and my questions have been answered. I have discussed any challenges I see with this plan with the nurse or doctor.    ..........................................................................................................................................  Patient/Patient Representative Signature      ..........................................................................................................................................  Patient Representative Print Name and Relationship to Patient    ..................................................               ................................................  Date                                   Time    ..........................................................................................................................................  Reviewed by Signature/Title    ...................................................              ..............................................  Date                                               Time          22EPIC Rev 08/18

## 2019-10-08 NOTE — ED TRIAGE NOTES
Hit back head on wall last evening, does not remember blacking out, but had to stand there for awhile. Has been feeling dizzy and difficulty concentrating since. Newly diagnosed DM.

## 2019-10-09 NOTE — DISCHARGE INSTRUCTIONS
Imaging negative for neck fracture or head injury.  Please return to the ER for new or worsening symptoms.

## 2019-10-09 NOTE — ED NOTES
SIGN-OUT:  - Assumed care of this patient from Dr. Goltz  - Pending at shift change: final reads and head CT and C spine CT  - Tentative plan from original EM Physician: discharge once final reads are back as long as they are negative (prelim negative)    RE-EVALUATION / REASSESSMENT:  - Results: Final reads negative for acute intracranial pathology and negative for acute cervical spine pathology  - No acute issues or interventions necessary while still in the emergency department.    DISPOSITION:  - Patient carried on with their original disposition plan.    MD Rafael Turcios, Jerrod Rider MD  10/08/19 6055

## 2019-10-10 ENCOUNTER — TELEPHONE (OUTPATIENT)
Dept: INTERNAL MEDICINE | Facility: CLINIC | Age: 49
End: 2019-10-10

## 2019-10-10 NOTE — TELEPHONE ENCOUNTER
[facility] Call [facility]    Phone Message    May a detailed message be left on voicemail: yes    Reason for Call: Other: the patient has been falling and went to the ER yesterday, he s having pain in his back and the pain is at 9 or 10 he s also not using his cpap machine please call Rylee to address the patients concerns      Action Taken: Message routed to:  [facility] pcc

## 2019-10-11 ENCOUNTER — TELEPHONE (OUTPATIENT)
Dept: INTERNAL MEDICINE | Facility: CLINIC | Age: 49
End: 2019-10-11

## 2019-10-11 NOTE — TELEPHONE ENCOUNTER
[facility]    Phone Message    May a detailed message be left on voicemail: yes    Reason for Call: Other: .  megan is recommending we contact pt to schedule appt due to some issues - cpap not working - recent fall (has already been in ER for that) - suspected gout - COPD but not taking anything for that.     Action Taken: Message routed to:  [facility] david

## 2019-10-11 NOTE — TELEPHONE ENCOUNTER
Left a message for Rylee that the patient should follow up with the pain clinic for his back pain. Also advised that the patient should follow up in clinic with PCP as we have only seen the patient for an establish care visit on 6/5/19 and patient has been in the ED two times since last visit. Recommended that the patient could try to contact past sleep clinic to see if they would be able to take a look at patients CPAP if he is having problems with the machine, or he would need to come to clinic to be evaluated. Referral for Sleep clinic has been placed for patient on 10/2/19 by NATALIYA Fairchild. Reena Enrique LPN 10/11/2019 8:42 AM

## 2019-10-14 NOTE — TELEPHONE ENCOUNTER
Left a message for Estela that she would need to recommend to the patient to schedule an appointment in clinic being that the patient reached out to her office to relay these concerns. Relayed number for scheduling an appointment, as well as the number for the sleep clinic to get CPAP concerns addressed. Reena Enrique LPN 10/14/2019 2:46 PM

## 2019-10-21 ENCOUNTER — THERAPY VISIT (OUTPATIENT)
Dept: PHYSICAL THERAPY | Facility: CLINIC | Age: 49
End: 2019-10-21
Payer: COMMERCIAL

## 2019-10-21 DIAGNOSIS — M54.41 RIGHT-SIDED LOW BACK PAIN WITH RIGHT-SIDED SCIATICA: ICD-10-CM

## 2019-10-21 DIAGNOSIS — M54.42 RIGHT-SIDED LOW BACK PAIN WITH LEFT-SIDED SCIATICA: ICD-10-CM

## 2019-10-21 DIAGNOSIS — E66.01 MORBID OBESITY (H): ICD-10-CM

## 2019-10-21 PROCEDURE — 97140 MANUAL THERAPY 1/> REGIONS: CPT | Mod: GP | Performed by: PHYSICAL THERAPIST

## 2019-10-21 PROCEDURE — 97161 PT EVAL LOW COMPLEX 20 MIN: CPT | Mod: GP | Performed by: PHYSICAL THERAPIST

## 2019-10-21 PROCEDURE — 97112 NEUROMUSCULAR REEDUCATION: CPT | Mod: GP | Performed by: PHYSICAL THERAPIST

## 2019-10-21 NOTE — PROGRESS NOTES
[facility] Initial Evaluation  Subjective:  The history is provided by the patient. No  was used.   Type of problem:  Lumbar (right low back pain , October 28., 2014)   Occurance: grating gravel  This is a chronic condition    Patient reports pain:  Lumbar spine right. Radiates to: (B) lateral thigh, (B) buttock  Associated symptoms:  Loss of motion and loss of strength. Symptoms are exacerbated by standing, sitting, bending and lifting Relieved by: none.      and reported as 9/10 on pain scale. General health as reported by patient is fair. Pertinent medical history includes:  Diabetes (overweight, sleep disorder/apnea).           Pain is described as burning and aching and is constant. Pain is worse during the day. Since onset symptoms are unchanged. Imaging testing: none. Past treatment: none. Improved with treatment: na.   Patient is - 4 hours per day- 20 hours a week and on disability . Restrictions include:  Working in normal job with restrictions.                            Objective:  System         Lumbar/SI Evaluation  ROM:    AROM Lumbar:   Flexion:          50% with right LBP with pulling (B) HS  Ext:                    10% with right low back pain    Side Bend:        Left:  10% with right low back pain     Right:  25% with right low back pain   Rotation:           Left:     Right:   Side Glide:        Left:     Right:           Lumbar Myotomes:    T12-L3 (Hip Flex):  Left: 5    Right: 5  L2-4 (Quads):  Left:  5    Right:  4  L4 (Ankle DF):  Left:  5    Right:  5  L5 (Great Toe Ext): Left: 5    Right: 4+   S1 (Toe Raise):  Left: 3-    Right: 3-  Lumbar DTR's:  not assessed      Cord Signs:  not assessed    Lumbar Dermtomes:  not assessed                Neural Tension/Mobility:  Lumbar:  Not assessed  Thoracic:  Not assessed                                                         Cortisone injection: 3 weeks ago and did not help. Gait: FWB with cane with  shortened stride length, right trunk side bend  and ganesh, poo posture, poor abdominal tone, faulty abdominal recruitment with  compensatory abdominal bulging of upper and lower abdominals, right lumbar erector eretor spinae muscle tightness with palpation   General     ROS    Assessment/Plan:    Patient is a 49 year old male with lumbar complaints.    Patient has the following significant findings with corresponding treatment plan.                Diagnosis 1:   Right low back pain with radiculopathy  Pain -  hot/cold therapy, manual therapy, self management, education, directional preference exercise and home program  Decreased ROM/flexibility - manual therapy, therapeutic exercise, therapeutic activity and home program  Decreased strength - therapeutic exercise, therapeutic activities and home program  Impaired muscle performance - neuro re-education and home program  Decreased function - therapeutic activities and home program  Diagnosis 2:  morboid obesity   Pain -  hot/cold therapy, manual therapy, self management, education, directional preference exercise and home program  Decreased ROM/flexibility - manual therapy, therapeutic exercise, therapeutic activity and home program  Decreased strength - therapeutic exercise, therapeutic activities and home program  Impaired muscle performance - neuro re-education and home program  Decreased function - therapeutic activities and home program      Therapy Evaluation Codes:   1) Clinical presentation characteristics are:   Stable/Uncomplicated.  2) Decision-Making    Low complexity using standardized patient assessment instrument and/or measureable assessment of functional outcome.  Cumulative Therapy Evaluation is: Low complexity.    Previous and current functional limitations:  (See Goal Flow Sheet for this information)    Short term and Long term goals: (See Goal Flow Sheet for this information)     Communication ability:  Patient appears to be able to clearly  communicate and understand verbal and written communication and follow directions correctly.  Treatment Explanation - The following has been discussed with the patient:   RX ordered/plan of care  Anticipated outcomes  Possible risks and side effects  This patient would benefit from PT intervention to resume normal activities.   Rehab potential is excellent.    Frequency:  1 X week, once daily  Duration:  for 6 weeks  Discharge Plan:  Achieve all LTG.  Independent in home treatment program.  Reach maximal therapeutic benefit.    Please refer to the daily flowsheet for treatment today, total treatment time and time spent performing 1:1 timed codes.

## 2019-10-29 NOTE — TELEPHONE ENCOUNTER
RECORDS RECEIVED FROM:   DATE RECEIVED:   NOTES STATUS DETAILS   OFFICE NOTE from referring provider    Internal 10-2-19 Carlee   OFFICE NOTE from other cardiologist    N/A    DISCHARGE SUMMARY from hospital    N/A    DISCHARGE REPORT from the ER   N/A    OPERATIVE REPORT    N/A    MEDICATION LIST   Internal    LABS     BMP   Internal 7-22-19   CBC   Internal 10-2-19   CMP   Internal 10-2-19   Lipids   Internal 10-2-19   TSH   Care Everywhere 5-9-19   DIAGNOSTIC PROCEDURES     EKG   In process    Monitor Reports   N/A    IMAGING (DISC & REPORT)      Echo   N/A    Stress Tests   N/A    Cath   N/A    MRI/MRA   N/A    CT/CTA   N/A      Action    Action Taken 10-29: Requested ekg from he  11-4: Received EKG and sent to scanning

## 2019-11-04 ENCOUNTER — OFFICE VISIT (OUTPATIENT)
Dept: SURGERY | Facility: CLINIC | Age: 49
End: 2019-11-04
Payer: COMMERCIAL

## 2019-11-04 ENCOUNTER — OFFICE VISIT (OUTPATIENT)
Dept: PHARMACY | Facility: CLINIC | Age: 49
End: 2019-11-04
Payer: COMMERCIAL

## 2019-11-04 VITALS — WEIGHT: 295.2 LBS | BODY MASS INDEX: 46.23 KG/M2

## 2019-11-04 DIAGNOSIS — Z71.3 NUTRITIONAL COUNSELING: Primary | ICD-10-CM

## 2019-11-04 DIAGNOSIS — E66.01 MORBID OBESITY (H): ICD-10-CM

## 2019-11-04 DIAGNOSIS — Z87.891 HISTORY OF TOBACCO ABUSE: ICD-10-CM

## 2019-11-04 DIAGNOSIS — E11.69 TYPE 2 DIABETES MELLITUS WITH OTHER SPECIFIED COMPLICATION, UNSPECIFIED WHETHER LONG TERM INSULIN USE (H): Primary | ICD-10-CM

## 2019-11-04 PROCEDURE — 99207 ZZC NO CHARGE LOS: CPT | Performed by: PHARMACIST

## 2019-11-04 RX ORDER — TOPIRAMATE 25 MG/1
TABLET, FILM COATED ORAL
Qty: 90 TABLET | Refills: 2 | Status: SHIPPED | OUTPATIENT
Start: 2019-11-04 | End: 2019-12-04

## 2019-11-04 NOTE — PATIENT INSTRUCTIONS
"GOALS:  Relating To Eating:  - Eat slowly (20-30 minutes per meal), chewing foods well (25 chews per bite/applesauce consistency)  - Eat 3 meals per day. Use 9\" Plate method (1/2 plate non-starchy vegetables/fruit, 1/4 plate lean protein, 1/4 plate whole grain starch - no more than 1/2 cup carb/meal)  - Avoid snacking    *Protein Shake Criteria: no more than 210 Calories, at least 20 grams of protein, and less than 10 grams of sugar     Meal Replacement Shake Options:   [facility] smoothie (160 Calories, 20 g protein)   Premier Protein (160 Calories, 30 g protein)  Slim Fast Advanced Nutrition (180 Calories, 20 g protein)  Muscle Milk, lactose-free, 17 oz bottle (210 Calories, 30 g protein)  Integrated Supplements, no artificial sugars (110 Calories, 20 g protein)    Meal Replacement Bar Options:  [facility] Protein Shake (160 Calories, 15 g protein)  Quest Protein Bars (190 Calories, 20 g protein)  Built Bar (170 Calories, 15-20 g protein)  One Protein Bar (210 calories, 20 g protein)  [place] Signature Protein Bar ([employer]) (190 Calories, 21 g protein)  Pure Protein Bars (180 Calories, 21 g protein)    Relating to beverages:  - Separate fluids from meals by 30 minutes before, during, and after eating  - Avoid calorie-containing beverages - Avoid juice and 2% milk  - Drink 48-64 ounces of fluid per day    Relating to dietary supplements:  - Start a multivitamin containing iron daily    Relating to activity:  - Increase activity as able    Relating to cravings:  - Practice alternatives to emotion eating    Follow up with RD in one month    Gayathri Chicas MS, RD, LD  If you need to schedule or reschedule with a dietitian please call [phone].  "

## 2019-11-04 NOTE — Clinical Note
Ricky Holland, I saw Bill today for a consult to discuss weight loss medications, seen at the [facility] or weight loss and potential bariatric surgery. He has recent diagnosis of T2DM (A1c 6.5%), started him on Ozempic + topiramate (this started last month) for more weight loss perspective as A1c is in goal. You see him later this week. Please let me know if you have questions or concerns. Sending as FYI. Thank you! Cira HUA

## 2019-11-04 NOTE — PATIENT INSTRUCTIONS
Recommendations from today's MTM visit:                                                      1. Restart taking topiramate 25 mg tablet: 2 tablets daily for 3 days, then increase to 3 tablets daily. Try taking when you get home from work around dinnertime.     2. Can try meal replacement shake that is available at [facility] or wherever else you would like to get it. If you want to get the ready made meal replacement shakes, look on the back on the label and make sure that the protein states at least 20 gram of protein.     3. Can try the Ozempic to see if this will also help with weight loss. Start by injecting 0.25 mg weekly for 4 weeks, then increase to 0.5 mg weekly. When you get the prescription, put the box in the refrigerator until it is time to take your first dose. Once you have given yourself the first dose, the pen is good at room temperature for 6 weeks. [facility]     It was great to speak with you today.  I value your experience and would be very thankful for your time with providing feedback on our clinic survey. You may receive a survey via email or text message in the next few days.     Next MTM visit: 1 month    To schedule another MTM appointment, please call the clinic directly or you may call the MTM scheduling line at [phone] or toll-free at [phone].     My Clinical Pharmacist's contact information:                                                      It was a pleasure talking with you today!  Please feel free to contact me with any questions or concerns you have.      Lauren Bloch, PharmD  Medication Therapy Management Pharmacist   [facility]   Phone: [phone]

## 2019-11-04 NOTE — PROGRESS NOTES
Therapy Management:                                                    Bill Wisdom is a 49 year old male coming in for a therapy management visit.  He was referred to me from Anna Marie Bejarano PA-C.     *Patient was thought to not be covered for [facility] services, but secondary insurance was discovered that patient is covered with secondary insurance.     Reason for Consult: topiramate start and education.     Obesity: was taking Topiramate 75 mg once daily at bedtime, but ran out so no longer taking. He was unaware that prescription was sent with refills to pharmacy. Followed by Anna Marie Bjearano PA-C, first seen 10/2/19 for Bariatric Consult. Patient is experiencing the follow side effects: None when he was taking topiramate. He is unsure if it was effective when he was using. Not sleeping well, had CPAP machine but was stolen and has appt with sleep medicine on 11/20/19. He states that he is able to fall asleep but wakes up in middle of night (despite utilization of trazodone), will snack at times during that time when he wakes up. No personal or family history of medullary thyroid carcinoma. No history of pancreatitis.   Weight History: Previously seen by Dr. Billy for possible weight loss surgery, but moved to [place]. He moved back to [place] 5 months ago. Lived in shelter for 2 months when just moved back to [place]. History of cocaine abuse, intermittent but stopped in Jan 2019, no requirement of alcohol/drug treatment previously.   Reason for wanting weight loss surgery: wanting to lose weight to be around for grandchildren and due to back pain.  Diet/Eating Habits: Patient reports his income is limited, so therefore his food choices are limited. He does find that he does eat fast food at times due to ease and cost, Has been working with dietitian, 2nd visit today of 3 required. Quit smoking and drinking Jan 2019.   Exercise: Patient reports activity is limited, walks with cane.     Weight today: 295 lb 3.2 oz   Initial  Consult Weight 10/2/19: 292 lb 12.8 oz  Weight loss goal prior to surgery: 282 lb     Wt Readings from Last 4 Encounters:   11/04/19 295 lb 3.2 oz (133.9 kg)   10/08/19 288 lb 11.2 oz (131 kg)   10/02/19 292 lb 12.8 oz (132.8 kg)   09/16/19 270 lb (122.5 kg)     Creatinine   Date Value Ref Range Status   10/02/2019 0.84 0.66 - 1.25 mg/dL Final     GFR Estimate   Date Value Ref Range Status   10/02/2019 >90 >60 mL/min/[1.73_m2] Final     Comment:     Non  GFR Calc  Starting 12/18/2018, serum creatinine based estimated GFR (eGFR) will be   calculated using the Chronic Kidney Disease Epidemiology Collaboration   (CKD-EPI) equation.       GFR Estimate If Black   Date Value Ref Range Status   10/02/2019 >90 >60 mL/min/[1.73_m2] Final     Comment:      GFR Calc  Starting 12/18/2018, serum creatinine based estimated GFR (eGFR) will be   calculated using the Chronic Kidney Disease Epidemiology Collaboration   (CKD-EPI) equation.       Lab Results   Component Value Date    A1C 6.5 10/02/2019     Discussion: Patient has gained weight since initial consult. Patient to work on nutritional goals discussed with dietitian today. Would benefit from utilization of topiramate, and taking for more consistent period of time to see if beneficial for weight loss. Considered controlled diabetes as A1c <7%. Due to recent diagnosis of diabetes, can utilize GLP1 agonist as potential option for additional assistance for weight loss. Believe that topiramte and GLP1 agonist can work synergistically. Would benefit from MTM visit to discuss all medications, including necessity of statin due to recent diabetes diagnosis. Discussed to follow up with PCP regarding recent diabetes diagnosis.     Plan:    1. Restart taking topiramate 25 mg tablet: 2 tablets daily for 3 days, then increase to 3 tablets daily. Try taking when you get home from work around dinnertime.     2. Can try meal replacement shake that is available  at [employer] or wherever else you would like to get it. If you want to get the ready made meal replacement shakes, look on the back on the label and make sure that the protein states at least 20 gram of protein.     3. Can try the Ozempic to see if this will also help with weight loss. Start by injecting 0.25 mg weekly for 4 weeks, then increase to 0.5 mg weekly. When you get the prescription, put the box in the refrigerator until it is time to take your first dose. Once you have given yourself the first dose, the pen is good at room temperature for 6 weeks. refills will be at the [facility].     4. Follow up with PCP regarding recent diagnosis of diabetes so that appropriate additional labs/monitoring can be completed.     5. Would benefit from initial MTM visit for comprehensive review of medications - patient to schedule this in future when following up on medications.     Lauren Bloch, PharmD  Medication Therapy Management Pharmacist   [facility]   Phone: [phone]

## 2019-11-04 NOTE — LETTER
"11/4/2019       RE: Bill Wisdom  [address]  [facility] [address]     Dear Colleague,    Thank you for referring your patient, Bill Wisdom, to the [facility] SURGICAL WEIGHT MANAGEMENT at [facility]. Please see a copy of my visit note below.    New Bariatric Nutrition Consultation Note    Reason For Visit: Nutrition Assessment    Bill Wisdom is a 49 year old presenting today for new bariatric nutrition consult.   Pt is interested in laparoscopic sleeve gastrectomy with Dr. Billy expected surgery in Jan 2020.  Patient is accompanied by self.  This is pt's 2nd of first of 3 required nutrition visits prior to surgery.     Pt referred by MEGA Guerra on October 2, 2019.  Patient with Co-morbidities of obesity including:   Dyslipidemia yes  Joint pain yes  Back pain yes  GERD yes     Support System Reviewed With Patient 10/2/2019   Who do you have in your support network that can be available to help you for the first 2 weeks after surgery? girlfriend   Who can you count on for support throughout your weight loss surgery journey? girlfriend   Reason for wanting weight loss surgery: \"Wants to be able to have energy, and exercise/move without pain\". He also wants to be around for his grandchildren.    ANTHROPOMETRICS:  Estimated body mass index is 45.22 kg/m  as calculated from the following:    Height as of 10/8/19: 1.702 m (5' 7\").    Weight as of 10/8/19: 131 kg (288 lb 11.2 oz).    Required weight loss goal pre-op: 10 lbs from initial consult weight (goal weight 282 lbs or less before surgery)       10/2/2019   I have tried the following methods to lose weight Exercise, Atkins type diet (low carb/high protein), Physician directed program       Weight Loss Questions Reviewed With Patient 10/2/2019   How long have you been overweight? Since late 20's to early 40's       SUPPLEMENT INFORMATION:  MVI daily, vitamin D3 5000 international unit(s) daily  Milk " "thistle is listed in his med list but he is no longer taking per his report    NUTRITION HISTORY:  Recall Diet Questions Reviewed With Patient 10/2/2019   Describe what you typically consume for breakfast (typical or most recent): Small cup of grits, hash browns,Turkey sausage,eggs, banana; cold cereal    Describe what you typically consume for lunch (typical or most recent): 1/2 cheeseburger; split fries and 1/2 soda   Describe what you typically consume for supper (typical or most recent): Part of a turkey sandwich and a couple chips   Describe what you typically consume as snacks (typical or most recent): Chips, cookies - all day   How many ounces of water, or other low calorie drinks, do you drink daily (8 oz=1 glass)? 24 oz - water   How many ounces of caffeine (coffee, tea, pop) do you drink daily (8 oz=1 glass)? 8 oz - does not drink coffee   How many ounces of carbonated (pop, beer, sparkling water) drinks do you drinky daily (8 oz=1 glass)? 8 oz   How many ounces of juice, pop, sweet tea, sports drinks, protein drinks, other sweetened drinks, do you drink daily (8 oz=1 glass)? 8 oz    How many ounces of milk do you drink daily (8 oz=1 glass) 8 oz - 16oz   Please indicate the type of milk: 2% or skim   How often do you drink alcohol? Never   Pt notes he is trying to cut back on snacking and emotional eating, but is struggling at times.  Wakes up in the middle of the night and snacks.    Eating Habits 10/2/2019   Do you have any dietary restrictions? No   Do you currently binge eat (eat a large amount of food in a short time)? Yes   Are you an emotional eater? Yes   Do you get up to eat after falling asleep? Yes   What foods do you crave? none     PROGRESS WITH PREVIOUS GOALS:  Relating To Eating:  - Eat slowly (20-30 minutes per meal), chewing foods well (25 chews per bite/applesauce consistency)  - Continues/improving  - Eat 3 meals per day. Use 9\" Plate method (1/2 plate non-starchy vegetables/fruit, 1/4 " "plate lean protein, 1/4 plate whole grain starch - no more than 1/2 cup carb/meal)  - Continues  - Avoid snacking  - Continues/improving    Relating to beverages:  - Separate fluids from meals by 30 minutes before, during, and after eating  - Continues  - Avoid calorie-containing beverages  - Continues - still drinking some OJ  - Drink 48-64 ounces of fluid per day  - Continues    Relating to dietary supplements:  - Start a multivitamin containing iron daily  - Met    Relating to activity:  - Increase activity as able  - Not met - pt likes going to the gym but has not made a concrete goal to go on a weekly basis at this time.    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 Since then pt has gained 50 lbs. Pt finds it difficult to go to grocery store and avoid fried foods in deli. Pt recognizes that he has developed emotional eating habits. Eating to find pleasure, and not necessarily hungry.   - Can receive \"Mom's meals,\" home-delivered meals.   - Works for [employer] - snacks abundant at work.   - Was trying to drink green smoothie with PB in the morning -  But felt like it wasn't enough.   - Not currently using his CPAP (does not have it anymore - family member has it?).     Dining Out History Reviewed With Patient 10/2/2019   How often do you dine out? Nearly every day.   Where do you dine out? (select all that apply) fast food chains- [employer], [employer], chinese buffet   What types of food do you order when you dine out? chicken       Physical Activity Reviewed With Patient 10/2/2019   How often do you exercise? Less than 1 time per week   What is the duration of your exercise (in minutes)? 10 Minutes    What types of exercise do you do? walking, gym membership   What keeps you from being more active?  I am as active as I can possbily be, Pain, Shortness " of breath, Lack of Time, Too tired     MALNUTRITION  % Intake: No decreased intake noted  % Weight Loss: None noted  Subcutaneous Fat Loss: None observed  Muscle Loss: None observed  Fluid Accumulation/Edema: None noted  Malnutrition Diagnosis: Patient does not meet two of the above criteria necessary for diagnosing malnutrition    NUTRITION DIAGNOSIS:  Obesity r/t long history of self-monitoring deficit and excessive energy intake aeb BMI >30 kg/m2. - Continues    INTERVENTION:  Reviewed post-op diet guidelines, vitamins/minerals essential post-operatively, GI anatomy of bariatric surgeries, ways to help prepare for post-op diet guidelines pre-operatively, portion/calorie-control, mindful eating and sources of protein. Discussed fluid needs and beverage intake after surgery. Recommended he practice slow, small sips of water rather than large gulps given that he cannot gulp water after surgery. Discussed that soda and juice should be avoided before and after surgery (unless following juice guidelines provided in clear/full liquid diet ed). Encouraged him to continue reducing portion sizes and increasing non-starchy vegetable intake. Discussed importance of including protein at each meal and provided sources of protein handout with in-depth discussion on protein needs after surgery. Provided him information on protein shake/bar meal replacements. Encouraged him to find alternatives to snacking during the day and at night. Provided encouragement and support. Also provided pt with letter to provide his PCP for letter of support (per his request - he was uncertain whether he obtained letter at initial visit). Provided pt with list of goals RD contact information.      Questions Reviewed With Patient 10/2/2019   How ready are you to make changes regarding your weight? Number 1 = Not ready at all to make changes up to 10 = very ready. 10   How confident are you that you can change? 1 = Not confident that you will be  "successful making changes up to 10 = very confident. 10       Patient Understanding: good  Expected Compliance: good    GOALS:  Relating To Eating:  - Eat slowly (20-30 minutes per meal), chewing foods well (25 chews per bite/applesauce consistency)  - Eat 3 meals per day. Use 9\" Plate method (1/2 plate non-starchy vegetables/fruit, 1/4 plate lean protein, 1/4 plate whole grain starch - no more than 1/2 cup carb/meal)  - Avoid snacking    *Protein Shake Criteria: no more than 210 Calories, at least 20 grams of protein, and less than 10 grams of sugar     Meal Replacement Shake Options:   [facility] smoothie (160 Calories, 20 g protein)   Premier Protein (160 Calories, 30 g protein)  Slim Fast Advanced Nutrition (180 Calories, 20 g protein)  Muscle Milk, lactose-free, 17 oz bottle (210 Calories, 30 g protein)  Integrated Supplements, no artificial sugars (110 Calories, 20 g protein)    Meal Replacement Bar Options:  [facility] Protein Shake (160 Calories, 15 g protein)  Quest Protein Bars (190 Calories, 20 g protein)  Built Bar (170 Calories, 15-20 g protein)  One Protein Bar (210 calories, 20 g protein)  [facility] Signature Protein Bar ([employer]) (190 Calories, 21 g protein)  Pure Protein Bars (180 Calories, 21 g protein)    Relating to beverages:  - Separate fluids from meals by 30 minutes before, during, and after eating  - Avoid calorie-containing beverages - Avoid juice and 2% milk  - Drink 48-64 ounces of fluid per day    Relating to dietary supplements:  - Start a multivitamin containing iron daily    Relating to activity:  - Increase activity as able    Relating to cravings:  - Practice alternatives to emotion eating    Time spent with patient: 30 minutes.  Gayathri Chicas MS, RD, LD    "

## 2019-11-05 ENCOUNTER — CARE COORDINATION (OUTPATIENT)
Dept: SURGERY | Facility: CLINIC | Age: 49
End: 2019-11-05

## 2019-11-05 LAB — COTININE UR QL: NEGATIVE NG/ML

## 2019-11-05 NOTE — PROGRESS NOTES
Tasklist updated.  Whisper still pending - unable to send updated tasklist via Whisper.  Bariatric Task List  Status:  Is patient a candidate for bariatric surgery?:  patient is a candidate for bariatric surgery -     Cleared to schedule surgeon consult?:    -     Status:  surgery evaluation in process -     Surgeon: Mariajose Luna surgery month/year: Jan 2020 - call Maciel at [phone] for OR date. -        Insurance: Insurance:  [facility] [place] -        Patient Info: Initial Weight:  292 -     Date of Initial Weight/Height:  10/2/2019 -     Goal Weight (lbs):  282 -     Required Weight Loss:  10 -     Surgery Type:  sleeve gastrectomy -        Dietician Visits: Structured weight loss required by insurance?:  structured weight loss required -     Dietician Visit 1:  Completed - October 2019   Dietician Visit 2:  Completed - November 2019   Dietician Visit 3:  Needed - 12/4/19 appt   Dietician Visit 4:  Needed - 1/2/19 appt - if not at goal weight   Dietician Visit 5:    -     Dietician Visit 6:    -     Dietician Visit additional:    -     Clearance from dietician to see surgeon?:    -     Dietician Notes:    -        Psychological Evaluation: Psych eval:  Needed - 12/19/19 Dr Major appt   Therapist letter of support:  Needed - Domonique CASTELLANOS at [facility]   Psychiatrist letter of support:  Needed - Juju Piper PMHNP      Lab Work: Complete Blood Count:  Completed -     Comprehensive Metabolic Panel:  Completed -     Vitamin D:  Needed - 10/2/19 vitamin D =8, repeat with PTH after treatment   Hgb A1c:  Completed -     PTH:  Needed -     Nicotine Testing:  Needed -     Other:  Completed - lipids      Consults/ Clearance: Sleep Medicine:  Needed - 11/20 Giuliano Giles-White   Cardiac:  Needed - 11/11/19 Daniel Mart   Medical Weight Management: Needed - started topiramate with Anna Marie 10/2/19 ramp to 75mg, 1/2 appt with Anna Marie   Physical Therapy/Exercise:  Needed -        PCP:  "Establish care with PCP:  Completed -     Follow up with PCP:    -     PCP letter of support:  Completed - 10/03/19 Amalia Hernandez, APRN, CNP - ltr in Epic      Smoking: Quit tobacco use (3 months smoke free)?:  Completed -     Quit date:  1/1/2019 -        Patient Education:  Information Session:  Completed -     Given \"Making your decision\" handout?:  Given \"\"Making your decision\"\" handout? -     Given support group information?:  support group contact information provided -     Attended support group?:    -     Support plan in place?:  Completed -     Research consents signed?:  research consent signed -        Final Tasks:  Before surgery online class:  Needed -     Before surgery online class website link:  [email or web address]   After surgery online class:  Needed -     After surgery online class website link:  [email or web address]   Nurse visit for weigh-in and information:  Needed -     Pre-assessment clinic visit with anesthesia team for H&P:  Needed -     Final labs (Hgb, plt, T&S, UA):  Needed -        Notes:   -           "
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
## 2019-11-08 ENCOUNTER — OFFICE VISIT (OUTPATIENT)
Dept: INTERNAL MEDICINE | Facility: CLINIC | Age: 49
End: 2019-11-08
Payer: COMMERCIAL

## 2019-11-08 VITALS
SYSTOLIC BLOOD PRESSURE: 118 MMHG | BODY MASS INDEX: 45.11 KG/M2 | HEART RATE: 60 BPM | DIASTOLIC BLOOD PRESSURE: 72 MMHG | OXYGEN SATURATION: 93 % | TEMPERATURE: 98.2 F | WEIGHT: 288 LBS

## 2019-11-08 DIAGNOSIS — E11.9 TYPE 2 DIABETES MELLITUS WITHOUT COMPLICATION, WITHOUT LONG-TERM CURRENT USE OF INSULIN (H): ICD-10-CM

## 2019-11-08 DIAGNOSIS — M10.9 GOUT, ARTHROPATHY: ICD-10-CM

## 2019-11-08 DIAGNOSIS — G89.29 CHRONIC BILATERAL LOW BACK PAIN WITH RIGHT-SIDED SCIATICA: ICD-10-CM

## 2019-11-08 DIAGNOSIS — M54.41 CHRONIC BILATERAL LOW BACK PAIN WITH RIGHT-SIDED SCIATICA: ICD-10-CM

## 2019-11-08 DIAGNOSIS — J41.0 SIMPLE CHRONIC BRONCHITIS (H): Primary | ICD-10-CM

## 2019-11-08 RX ORDER — ALBUTEROL SULFATE 1.25 MG/3ML
1.25 SOLUTION RESPIRATORY (INHALATION) EVERY 6 HOURS PRN
Qty: 60 VIAL | Refills: 3 | Status: SHIPPED | OUTPATIENT
Start: 2019-11-08 | End: 2021-07-09

## 2019-11-08 RX ORDER — TIZANIDINE HYDROCHLORIDE 4 MG/1
4 CAPSULE, GELATIN COATED ORAL 2 TIMES DAILY PRN
Qty: 30 CAPSULE | Refills: 1 | Status: SHIPPED | OUTPATIENT
Start: 2019-11-08 | End: 2021-07-09

## 2019-11-08 RX ORDER — ALLOPURINOL 100 MG/1
100 TABLET ORAL DAILY
Qty: 90 TABLET | Refills: 1 | Status: SHIPPED | OUTPATIENT
Start: 2019-11-08 | End: 2021-07-09

## 2019-11-08 ASSESSMENT — PAIN SCALES - GENERAL: PAINLEVEL: EXTREME PAIN (8)

## 2019-11-08 NOTE — NURSING NOTE
Chief Complaint   Patient presents with     Recheck Medication     pt here for nebulizer, muscle relaxer, and antibiotics     Hospital F/U     pt here for hospital follow up from 10/8       Viola Beyer CMA, EMT at 8:29 AM on 11/8/2019.

## 2019-11-08 NOTE — PATIENT INSTRUCTIONS
[facility] Medication Refill Request Information:  * Please contact your pharmacy regarding ANY request for medication refills.  ** [facility] Fax = [phone]  * Please allow 3 business days for routine medication refills.  * Please allow 5 business days for controlled substance medication refills.     [facility] Test Result notification information:  *You will be notified with in 7-10 days of your appointment day regarding the results of your test.  If you are on MyChart you will be notified as soon as the provider has reviewed the results and signed off on them.    [facility]: [phone]     Patient Education     Gout    Gout is an inflammation of a joint due to a build-up of gout crystals in the joint fluid. This occurs when there is an excess of uric acid (a normal waste product) in the body. Uric acid builds up in the body when the kidneys are unable to filter enough of it from the blood. This may occur with age. It is also associated with kidney disease. Gout occurs more often in people with obesity, diabetes, high blood pressure, or high levels of fats in the blood. It may run in families. Gout tends to come and go. A flare up of gout is called an attack. Drinking alcohol or eating certain foods (such as shellfish or foods with additives such as high-fructose corn syrup) may increase uric acid levels in the blood and cause a gout attack.  During a gout attack, the affected joint may become a hot, red, swollen and painful. If you have had one attack of gout, you are likely to have another. An attack of gout can be treated with medicine. If these attacks become frequent, a daily medicine may be prescribed to help the kidneys remove uric acid from the body.  Home care  During a gout attack:    Rest painful joints. If gout affects the joints of your foot or leg, you may want to use crutches for the first few days to keep from bearing weight on the affected joint.    When  sitting or lying down, raise the painful joint to a level higher than your heart.    Apply an ice pack (ice cubes in a plastic bag wrapped in a thin towel) over the injured area for 20 minutes every 1 to 2 hours the first day for pain relief. Continue this 3 to 4 times a day for swelling and pain.    Avoid alcohol and foods listed below (see Preventing attacks) during a gout attack. Drink extra fluid to help flush the uric acid through your kidneys.    If you were prescribed a medicine to treat gout, take it as your healthcare provider has instructed. Don't skip doses.    Take anti-inflammatory medicine as directed.     If pain medicines have been prescribed, take them exactly as directed.    Preventing attacks    Minimize or avoid alcohol use. Excess alcohol intake can cause a gout attack.    Limit these foods and beverages:  ? Organ meats, such as kidneys and liver  ? Certain seafoods (anchovies, sardines, shrimp, scallops, herring, mackerel)  ? Wild game, meat extracts and meat gravies  ? Foods and beverages sweetened with high-fructose corn syrup, such as sodas    Eat a healthy diet including low-fat and nonfat dairy, whole grains, and vegetables.    If you are overweight, talk to your healthcare provider about a weight reduction plan. Avoid fasting or extreme low calorie diets (less than 900 calories per day). This will increase uric acid levels in the body.    If you have diabetes or high blood pressure, work with your doctor to manage these conditions.    Protect the joint from injury. Trauma can trigger a gout attack.  Follow-up care  Follow up with your healthcare provider, or as advised.   When to seek medical advice  Call your healthcare provider if you have any of the following:    Fever over 100.4 F (38. C) with worsening joint pain    Increasing redness around the joint    Pain developing in another joint    Repeated vomiting, abdominal pain, or blood in the vomit or stool (black or red color)  Date  Last Reviewed: 3/1/2017    8093-2297 [employer]. [address] [place] [address], [place] [address]. All rights reserved. This information is not intended as a substitute for professional medical care. Always follow your healthcare professional's instructions.

## 2019-11-11 ENCOUNTER — OFFICE VISIT (OUTPATIENT)
Dept: CARDIOLOGY | Facility: CLINIC | Age: 49
End: 2019-11-11
Attending: PHYSICIAN ASSISTANT
Payer: COMMERCIAL

## 2019-11-11 ENCOUNTER — PATIENT OUTREACH (OUTPATIENT)
Dept: CARE COORDINATION | Facility: CLINIC | Age: 49
End: 2019-11-11

## 2019-11-11 ENCOUNTER — PRE VISIT (OUTPATIENT)
Dept: CARDIOLOGY | Facility: CLINIC | Age: 49
End: 2019-11-11

## 2019-11-11 ENCOUNTER — HOSPITAL ENCOUNTER (OUTPATIENT)
Dept: CARDIOLOGY | Facility: CLINIC | Age: 49
Discharge: HOME | End: 2019-11-11
Attending: INTERNAL MEDICINE | Admitting: INTERNAL MEDICINE
Payer: COMMERCIAL

## 2019-11-11 VITALS
SYSTOLIC BLOOD PRESSURE: 123 MMHG | WEIGHT: 289 LBS | BODY MASS INDEX: 43.8 KG/M2 | HEART RATE: 63 BPM | HEIGHT: 68 IN | DIASTOLIC BLOOD PRESSURE: 71 MMHG | OXYGEN SATURATION: 94 %

## 2019-11-11 DIAGNOSIS — Z01.818 PREOPERATIVE CLEARANCE: ICD-10-CM

## 2019-11-11 DIAGNOSIS — R06.02 SHORTNESS OF BREATH: ICD-10-CM

## 2019-11-11 DIAGNOSIS — E66.01 MORBID OBESITY (H): ICD-10-CM

## 2019-11-11 DIAGNOSIS — E78.5 DYSLIPIDEMIA: ICD-10-CM

## 2019-11-11 DIAGNOSIS — E66.01 MORBID OBESITY (H): Primary | ICD-10-CM

## 2019-11-11 LAB — INTERPRETATION ECG - MUSE: NORMAL

## 2019-11-11 PROCEDURE — 93350 STRESS TTE ONLY: CPT | Mod: 26 | Performed by: INTERNAL MEDICINE

## 2019-11-11 PROCEDURE — 40000141 ZZH STATISTIC PERIPHERAL IV START W/O US GUIDANCE

## 2019-11-11 PROCEDURE — 93016 CV STRESS TEST SUPVJ ONLY: CPT | Performed by: INTERNAL MEDICINE

## 2019-11-11 PROCEDURE — 93321 DOPPLER ECHO F-UP/LMTD STD: CPT | Mod: 26 | Performed by: INTERNAL MEDICINE

## 2019-11-11 PROCEDURE — 93005 ELECTROCARDIOGRAM TRACING: CPT | Mod: XU

## 2019-11-11 PROCEDURE — G0463 HOSPITAL OUTPT CLINIC VISIT: HCPCS | Mod: 25,ZF

## 2019-11-11 PROCEDURE — 40000264 ECHO STRESS ECHOCARDIOGRAM

## 2019-11-11 PROCEDURE — 93325 DOPPLER ECHO COLOR FLOW MAPG: CPT | Mod: 26 | Performed by: INTERNAL MEDICINE

## 2019-11-11 PROCEDURE — 93010 ELECTROCARDIOGRAM REPORT: CPT | Mod: ZP | Performed by: INTERNAL MEDICINE

## 2019-11-11 PROCEDURE — 25000125 ZZHC RX 250: Performed by: INTERNAL MEDICINE

## 2019-11-11 PROCEDURE — 99204 OFFICE O/P NEW MOD 45 MIN: CPT | Mod: GC | Performed by: INTERNAL MEDICINE

## 2019-11-11 PROCEDURE — 25500064 ZZH RX 255 OP 636: Performed by: INTERNAL MEDICINE

## 2019-11-11 PROCEDURE — 25000128 H RX IP 250 OP 636: Performed by: INTERNAL MEDICINE

## 2019-11-11 PROCEDURE — 93018 CV STRESS TEST I&R ONLY: CPT | Performed by: INTERNAL MEDICINE

## 2019-11-11 RX ORDER — DOBUTAMINE HYDROCHLORIDE 200 MG/100ML
10-50 INJECTION INTRAVENOUS CONTINUOUS
Status: ACTIVE | OUTPATIENT
Start: 2019-11-11 | End: 2019-11-11

## 2019-11-11 RX ORDER — ATROPINE SULFATE 0.4 MG/ML
.2-2 AMPUL (ML) INJECTION
Status: COMPLETED | OUTPATIENT
Start: 2019-11-11 | End: 2019-11-11

## 2019-11-11 RX ORDER — METOPROLOL TARTRATE 1 MG/ML
1-20 INJECTION, SOLUTION INTRAVENOUS
Status: ACTIVE | OUTPATIENT
Start: 2019-11-11 | End: 2019-11-11

## 2019-11-11 RX ORDER — SODIUM CHLORIDE 9 MG/ML
INJECTION, SOLUTION INTRAVENOUS CONTINUOUS
Status: ACTIVE | OUTPATIENT
Start: 2019-11-11 | End: 2019-11-11

## 2019-11-11 RX ADMIN — PERFLUTREN 8 ML: 6.52 INJECTION, SUSPENSION INTRAVENOUS at 10:04

## 2019-11-11 RX ADMIN — METOPROLOL TARTRATE 5 MG: 5 INJECTION INTRAVENOUS at 10:02

## 2019-11-11 RX ADMIN — ATROPINE SULFATE 1 MG: 0.4 INJECTION, SOLUTION INTRAMUSCULAR; INTRAVENOUS; SUBCUTANEOUS at 09:55

## 2019-11-11 RX ADMIN — DOBUTAMINE HYDROCHLORIDE 10 MCG/KG/MIN: 200 INJECTION INTRAVENOUS at 09:46

## 2019-11-11 ASSESSMENT — MIFFLIN-ST. JEOR: SCORE: 2140.4

## 2019-11-11 ASSESSMENT — PAIN SCALES - GENERAL: PAINLEVEL: NO PAIN (0)

## 2019-11-11 NOTE — PROGRESS NOTES
Pt here for dobutamine stress test.  Test, meds and side effects reviewed with patient.  Achieved target HR at 40 mcg Dobutamine and a total of 1.0 mg IV atropine.  Patient did become hypertensive during stress test, 187 systolic.  Baseline was 130's systolic.  Patient also reported mild chest pain which he states has resolved as his HR returned to baseline.  Gave a total of 5 mg IV Metoprolol to bring HR back to baseline.  Post monitoring complete and VSS.  Pt escorted out to the gold waiting room.

## 2019-11-11 NOTE — LETTER
11/11/2019      RE: Bill Wisdom  [address]  [place] [address]       Dear Colleague,    Thank you for the opportunity to participate in the care of your patient, Bill Wisdom, at the [facility]. Please see a copy of my visit note below.  HE[facility]ARE CENTER         HPI:   50 y/o male with PMHx significant for COPD, CHANDLER on CPAP and morbid obesity (BMI 44) comes in for preop evaluation of scheduled bariatric surgery. Patient has been attempting to lose weight unsuccessfully. He denies recurrent fever, chills, night sweats, headaches, cough, chest pain, shortness of breath, palpitations, nausea, vomiting, diarrhea, constipation, hematochezia, melena, dysuria, hematuria, presyncope, syncope, focal tingling, numbness or weakness.     Patient does have family history of heart disease. Mother had acute MI in her 60-70s and her father as well in his 60s-70s. Patient used to smoke few cigs per day and frequently used cocaine. However, he has not used any cigs, drugs or alcohol in the past year.     Problem, Medication and Allergy Lists were      Patient Active Problem List    Diagnosis Date Noted     Right-sided low back pain with right-sided sciatica 10/21/2019     Priority: Medium     Right-sided low back pain with left-sided sciatica 10/21/2019     Priority: Medium     Simple chronic bronchitis (H) 06/05/2019     Priority: Medium     Neck pain 06/05/2019     Priority: Medium     Obesity 12/17/2012     Priority: Medium     Esophageal reflux 12/17/2012     Priority: Medium     Spermatocele 02/24/2012     Priority: Medium     Chronic bilateral low back pain with right-sided sciatica 02/02/2012     Priority: Medium     Family history of arteriosclerotic cardiovascular disease 01/18/2012     Priority: Medium     M and F with CAD, in 60s, both with 3 vessel CABG       Family history of diabetes mellitus 01/18/2012     Priority: Medium     Mother        CARDIOVASCULAR SCREENING; LDL GOAL LESS THAN 160 01/04/2012     Priority: Medium     Current Outpatient Medications   Medication Sig Dispense Refill     acetaminophen (TYLENOL) 500 MG tablet Take 1-2 tablets (500-1,000 mg) by mouth every 6 hours as needed for mild pain or fever 90 tablet 1     albuterol (ACCUNEB) 1.25 MG/3ML neb solution Take 1 vial (1.25 mg) by nebulization every 6 hours as needed for shortness of breath / dyspnea or wheezing 60 vial 3     albuterol (PROVENTIL HFA) 108 (90 Base) MCG/ACT inhaler Inhale 2 puffs into the lungs every 6 hours as needed for shortness of breath / dyspnea or wheezing 8.5 g 3     allopurinol (ZYLOPRIM) 100 MG tablet Take 1 tablet (100 mg) by mouth daily 90 tablet 1     cholecalciferol (VITAMIN D3) 5000 units TABS tablet Take 5,000 Units by mouth daily       CRANBERRY        famotidine (PEPCID) 20 MG tablet Take 20 mg by mouth       FISH OIL        ibuprofen (ADVIL/MOTRIN) 600 MG tablet Take 1 tablet (600 mg) by mouth 4 times daily as needed for moderate pain 120 tablet 1     Multiple Vitamins-Minerals (MULTIVITAMIN ADULT) CHEW Take 1 chew tab by mouth daily 60 tablet 11     naproxen (NAPROSYN) 500 MG tablet Take 1 tablet (500 mg) by mouth 2 times daily as needed for moderate pain 60 tablet 1     ondansetron (ZOFRAN ODT) 4 MG ODT tab 1-2 tab dissolve on tongue 4 times daily as needed for nausea, max daily dose 4 tab       order for DME Equipment being ordered: Nebulizer and equipment 1 each 0     order for DME Equipment being ordered: Cane 1 Device 0     Semaglutide,0.25 or 0.5MG/DOS, (OZEMPIC, 0.25 OR 0.5 MG/DOSE,) 2 MG/1.5ML SOPN Inject 0.25 mg Subcutaneous once a week for 28 days, THEN 0.5 mg once a week. 1.5 mL 2     tiZANidine (ZANAFLEX) 4 MG capsule Take 1 capsule (4 mg) by mouth 2 times daily as needed for muscle spasms 30 capsule 1     topiramate (TOPAMAX) 25 MG tablet Take 2 tablets (50 mg) daily at dinnertime for 3 days, then increase to 3 tablets (75 mg) daily  at dinnertime. 90 tablet 2     traZODone (DESYREL) 100 MG tablet Take 100 mg by mouth At Bedtime       DULoxetine (CYMBALTA) 30 MG capsule Take 1 capsule (30 mg) by mouth daily 30 capsule 1       No Known Allergies  Patient is   an established patient of this clinic.,   Family History     Problem (# of Occurrences) Relation (Name,Age of Onset)    Alcohol/Drug (1) Sister (1)    C.A.D. (1) Mother: triple bypass    Cerebrovascular Disease (1) Father    Hypertension (1) Father       and   Social History     Socioeconomic History     Marital status: Single     Spouse name: None     Number of children: None     Years of education: None     Highest education level: None   Occupational History     None   Social Needs     Financial resource strain: None     Food insecurity:     Worry: None     Inability: None     Transportation needs:     Medical: None     Non-medical: None   Tobacco Use     Smoking status: Former Smoker     Packs/day: 0.00     Types: Cigarettes     Last attempt to quit: 3/16/2019     Years since quittin.6     Smokeless tobacco: Never Used   Substance and Sexual Activity     Alcohol use: No     Comment: quit drinking 6 months ago     Drug use: Not Currently     Sexual activity: Not Currently     Partners: Female     Comment: Past history of cocaine use   Lifestyle     Physical activity:     Days per week: None     Minutes per session: None     Stress: None   Relationships     Social connections:     Talks on phone: None     Gets together: None     Attends Anabaptist service: None     Active member of club or organization: None     Attends meetings of clubs or organizations: None     Relationship status: None     Intimate partner violence:     Fear of current or ex partner: None     Emotionally abused: None     Physically abused: None     Forced sexual activity: None   Other Topics Concern     Parent/sibling w/ CABG, MI or angioplasty before 65F 55M? No   Social History Narrative     None            Review  "of Systems:     ROS  Complete ROS negative unless otherwise specified.          Physical Exam:   /71 (BP Location: Left arm, Patient Position: Chair, Cuff Size: Adult Large)   Pulse 63   Ht 1.727 m (5' 8\")   Wt 131.1 kg (289 lb)   SpO2 94%   BMI 43.94 kg/m     Body mass index is 43.94 kg/m .  Vitals were reviewed       GENERAL APPEARANCE: healthy, alert and no distress     EYES: EOMI,  PERRL     HENT: ear canals and TM's normal and nose and mouth without ulcers or lesions     NECK: no adenopathy, no asymmetry, masses, or scars and thyroid normal to palpation     RESP: lungs clear to auscultation - no rales, rhonchi or wheezes     CV: regular rates and rhythm, normal S1 S2, no S3 or S4 and no murmur, click or rub, distant heart sounds due to body habitus     ABDOMEN: obese, non tender, non distended, no organomegaly, +bs     MS: extremities normal- no gross deformities noted, no evidence of inflammation in joints, FROM in all extremities.     SKIN: no suspicious lesions or rashes     NEURO: Normal strength and tone, sensory exam grossly normal, mentation intact and speech normal     PSYCH: mentation appears normal. and affect normal/bright     LYMPHATICS: No cervical adenopathy      Results:     Last Comprehensive Metabolic Panel:  Sodium   Date Value Ref Range Status   10/02/2019 140 133 - 144 mmol/L Final     Potassium   Date Value Ref Range Status   10/02/2019 4.0 3.4 - 5.3 mmol/L Final     Chloride   Date Value Ref Range Status   10/02/2019 106 94 - 109 mmol/L Final     Carbon Dioxide   Date Value Ref Range Status   10/02/2019 30 20 - 32 mmol/L Final     Anion Gap   Date Value Ref Range Status   10/02/2019 4 3 - 14 mmol/L Final     Glucose   Date Value Ref Range Status   10/02/2019 94 70 - 99 mg/dL Final     Urea Nitrogen   Date Value Ref Range Status   10/02/2019 15 7 - 30 mg/dL Final     Creatinine   Date Value Ref Range Status   10/02/2019 0.84 0.66 - 1.25 mg/dL Final     GFR Estimate   Date Value " Ref Range Status   10/02/2019 >90 >60 mL/min/[1.73_m2] Final     Comment:     Non  GFR Calc  Starting 12/18/2018, serum creatinine based estimated GFR (eGFR) will be   calculated using the Chronic Kidney Disease Epidemiology Collaboration   (CKD-EPI) equation.       Calcium   Date Value Ref Range Status   10/02/2019 9.0 8.5 - 10.1 mg/dL Final     Lab Results   Component Value Date    CHOL 203 10/02/2019     Lab Results   Component Value Date    HDL 45 10/02/2019     Lab Results   Component Value Date     10/02/2019     Lab Results   Component Value Date    TRIG 93 10/02/2019     Lab Results   Component Value Date    CHOLHDLRATIO 4.8 01/04/2012     Cardiac data:    ECG today was personally reviewed and shows normal sinus rhythm at 62, normal axis and intervals. Non specific T wave abnormalities          Assessment and Plan   Preoperative clearance  Morbid obesity (H)  Hyperlipidemia  Patient certainly has risk factors for CAD: elevated LDL, family history and past smoking and morbid obesity. He has some shortness of breath on exertion and it is difficult to establish if this is anginal equivalent or COPD. EKG here was normal for no signs of ischemia at rest. His revised cardiac risk score would place him at Class II-III. Additionally, he had past history of cocaine use. Therefore it would be advisable to have patient undergo dobutamine echo stress test to address his cardiac function.  - Echo dobutamine   - PFTs per PCP     Jaden Hoyos, [facility]  Cardiology Fellow  Nov 11, 2019    Pt was seen and plan of care discussed with Brittny.     ATTENDING ATTESTATION:  This patient has been seen and examined by me November 11, 2019 with Jaden Hoyos MD, cardiology fellow. I have reviewed the vitals, laboratory and imaging data relevant to this patient's care. I have edited this note to reflect our joint assessment and plan, and discussed the plan with the patient.    Bill Wisdom is a 49 year old year old  male with   Morbid obesity  Mixed dyslipidemia  Pre diabetes   COPD  Sleep apnea on CPAP     Patient is without overt angina or heart failure symptoms. Reports limited exercise tolerance mainly due to lower back pain and some exertional dyspnea works as a .  Family history of coronary artery disease in his parents in their 60s.  He has a prior history of tobacco and cocaine use.  He is euvolemic on exam, blood pressure and heart rate are in normal range. ECG shows sinus rhythm. Recent labs reviewed, LDL-c 139 mg/dL, hemoglobin, renal and liver function within normal limits.    Based on his limited exercise tolerance and risk factor burden we will proceed with an outpatient dobutamine echocardiogram to assess for inducible ischemia.  Baseline echo will also help assess for pulmonary hypertension given the morbid obesity, COPD and sleep apnea. Patient will be notified of the results and a subsequent follow-up will be scheduled as needed. Healthy lifestyle was reinforced with the patient.       Addendum   dobutamine stress echo done today after the clinic visit was negative for any inducible ischemia.  He has no evidence of pulmonary hypertension and his biventricular function is normal.    Bianca Mart MD, MS  Staff Cardiologist  Pager: [phone]

## 2019-11-11 NOTE — PATIENT INSTRUCTIONS
Patient Instructions:  It was a pleasure to see you in the cardiology clinic today.      If you have any questions, call  Vanessa Pal RN, at [phone].  Press Option #1 for the [facility], and then press Option #4  We are encouraging the use of [facility] to communicate with your HealthCare Provider    Note the new or changes to your medications: None  Stop the following medications: None    The results from today include: NA  Please follow up by having a dobutamine stress echo.       If you have an urgent need after hours (8:00 am to 4:30 pm) please call [phone] and ask for the cardiology fellow on call.

## 2019-11-11 NOTE — PROGRESS NOTES
HEART CARE CENTER         HPI:   48 y/o male with PMHx significant for COPD, CHANDLER on CPAP and morbid obesity (BMI 44) comes in for preop evaluation of scheduled bariatric surgery. Patient has been attempting to lose weight unsuccessfully. He denies recurrent fever, chills, night sweats, headaches, cough, chest pain, shortness of breath, palpitations, nausea, vomiting, diarrhea, constipation, hematochezia, melena, dysuria, hematuria, presyncope, syncope, focal tingling, numbness or weakness.     Patient does have family history of heart disease. Mother had acute MI in her 60-70s and her father as well in his 60s-70s. Patient used to smoke few cigs per day and frequently used cocaine. However, he has not used any cigs, drugs or alcohol in the past year.     Problem, Medication and Allergy Lists were      Patient Active Problem List    Diagnosis Date Noted     Right-sided low back pain with right-sided sciatica 10/21/2019     Priority: Medium     Right-sided low back pain with left-sided sciatica 10/21/2019     Priority: Medium     Simple chronic bronchitis (H) 06/05/2019     Priority: Medium     Neck pain 06/05/2019     Priority: Medium     Obesity 12/17/2012     Priority: Medium     Esophageal reflux 12/17/2012     Priority: Medium     Spermatocele 02/24/2012     Priority: Medium     Chronic bilateral low back pain with right-sided sciatica 02/02/2012     Priority: Medium     Family history of arteriosclerotic cardiovascular disease 01/18/2012     Priority: Medium     M and F with CAD, in 60s, both with 3 vessel CABG       Family history of diabetes mellitus 01/18/2012     Priority: Medium     Mother       CARDIOVASCULAR SCREENING; LDL GOAL LESS THAN 160 01/04/2012     Priority: Medium         Current Outpatient Medications   Medication Sig Dispense Refill     acetaminophen (TYLENOL) 500 MG tablet Take 1-2 tablets (500-1,000 mg) by mouth every 6 hours as needed for mild pain or fever 90 tablet 1     albuterol  (ACCUNEB) 1.25 MG/3ML neb solution Take 1 vial (1.25 mg) by nebulization every 6 hours as needed for shortness of breath / dyspnea or wheezing 60 vial 3     albuterol (PROVENTIL HFA) 108 (90 Base) MCG/ACT inhaler Inhale 2 puffs into the lungs every 6 hours as needed for shortness of breath / dyspnea or wheezing 8.5 g 3     allopurinol (ZYLOPRIM) 100 MG tablet Take 1 tablet (100 mg) by mouth daily 90 tablet 1     cholecalciferol (VITAMIN D3) 5000 units TABS tablet Take 5,000 Units by mouth daily       CRANBERRY        famotidine (PEPCID) 20 MG tablet Take 20 mg by mouth       FISH OIL        ibuprofen (ADVIL/MOTRIN) 600 MG tablet Take 1 tablet (600 mg) by mouth 4 times daily as needed for moderate pain 120 tablet 1     Multiple Vitamins-Minerals (MULTIVITAMIN ADULT) CHEW Take 1 chew tab by mouth daily 60 tablet 11     naproxen (NAPROSYN) 500 MG tablet Take 1 tablet (500 mg) by mouth 2 times daily as needed for moderate pain 60 tablet 1     ondansetron (ZOFRAN ODT) 4 MG ODT tab 1-2 tab dissolve on tongue 4 times daily as needed for nausea, max daily dose 4 tab       order for DME Equipment being ordered: Nebulizer and equipment 1 each 0     order for DME Equipment being ordered: Cane 1 Device 0     Semaglutide,0.25 or 0.5MG/DOS, (OZEMPIC, 0.25 OR 0.5 MG/DOSE,) 2 MG/1.5ML SOPN Inject 0.25 mg Subcutaneous once a week for 28 days, THEN 0.5 mg once a week. 1.5 mL 2     tiZANidine (ZANAFLEX) 4 MG capsule Take 1 capsule (4 mg) by mouth 2 times daily as needed for muscle spasms 30 capsule 1     topiramate (TOPAMAX) 25 MG tablet Take 2 tablets (50 mg) daily at dinnertime for 3 days, then increase to 3 tablets (75 mg) daily at dinnertime. 90 tablet 2     traZODone (DESYREL) 100 MG tablet Take 100 mg by mouth At Bedtime       DULoxetine (CYMBALTA) 30 MG capsule Take 1 capsule (30 mg) by mouth daily 30 capsule 1       No Known Allergies  Patient is   an established patient of this clinic.,   Family History     Problem (# of  "Occurrences) Relation (Name,Age of Onset)    Alcohol/Drug (1) Sister (1)    C.A.D. (1) Mother: triple bypass    Cerebrovascular Disease (1) Father    Hypertension (1) Father       and   Social History     Socioeconomic History     Marital status: Single     Spouse name: None     Number of children: None     Years of education: None     Highest education level: None   Occupational History     None   Social Needs     Financial resource strain: None     Food insecurity:     Worry: None     Inability: None     Transportation needs:     Medical: None     Non-medical: None   Tobacco Use     Smoking status: Former Smoker     Packs/day: 0.00     Types: Cigarettes     Last attempt to quit: 3/16/2019     Years since quittin.6     Smokeless tobacco: Never Used   Substance and Sexual Activity     Alcohol use: No     Comment: quit drinking 6 months ago     Drug use: Not Currently     Sexual activity: Not Currently     Partners: Female     Comment: Past history of cocaine use   Lifestyle     Physical activity:     Days per week: None     Minutes per session: None     Stress: None   Relationships     Social connections:     Talks on phone: None     Gets together: None     Attends Cheondoism service: None     Active member of club or organization: None     Attends meetings of clubs or organizations: None     Relationship status: None     Intimate partner violence:     Fear of current or ex partner: None     Emotionally abused: None     Physically abused: None     Forced sexual activity: None   Other Topics Concern     Parent/sibling w/ CABG, MI or angioplasty before 65F 55M? No   Social History Narrative     None            Review of Systems:     ROS  Complete ROS negative unless otherwise specified.          Physical Exam:   /71 (BP Location: Left arm, Patient Position: Chair, Cuff Size: Adult Large)   Pulse 63   Ht 1.727 m (5' 8\")   Wt 131.1 kg (289 lb)   SpO2 94%   BMI 43.94 kg/m    Body mass index is 43.94 " kg/m .  Vitals were reviewed       GENERAL APPEARANCE: healthy, alert and no distress     EYES: EOMI,  PERRL     HENT: ear canals and TM's normal and nose and mouth without ulcers or lesions     NECK: no adenopathy, no asymmetry, masses, or scars and thyroid normal to palpation     RESP: lungs clear to auscultation - no rales, rhonchi or wheezes     CV: regular rates and rhythm, normal S1 S2, no S3 or S4 and no murmur, click or rub, distant heart sounds due to body habitus     ABDOMEN: obese, non tender, non distended, no organomegaly, +bs     MS: extremities normal- no gross deformities noted, no evidence of inflammation in joints, FROM in all extremities.     SKIN: no suspicious lesions or rashes     NEURO: Normal strength and tone, sensory exam grossly normal, mentation intact and speech normal     PSYCH: mentation appears normal. and affect normal/bright     LYMPHATICS: No cervical adenopathy        Results:     Last Comprehensive Metabolic Panel:  Sodium   Date Value Ref Range Status   10/02/2019 140 133 - 144 mmol/L Final     Potassium   Date Value Ref Range Status   10/02/2019 4.0 3.4 - 5.3 mmol/L Final     Chloride   Date Value Ref Range Status   10/02/2019 106 94 - 109 mmol/L Final     Carbon Dioxide   Date Value Ref Range Status   10/02/2019 30 20 - 32 mmol/L Final     Anion Gap   Date Value Ref Range Status   10/02/2019 4 3 - 14 mmol/L Final     Glucose   Date Value Ref Range Status   10/02/2019 94 70 - 99 mg/dL Final     Urea Nitrogen   Date Value Ref Range Status   10/02/2019 15 7 - 30 mg/dL Final     Creatinine   Date Value Ref Range Status   10/02/2019 0.84 0.66 - 1.25 mg/dL Final     GFR Estimate   Date Value Ref Range Status   10/02/2019 >90 >60 mL/min/[1.73_m2] Final     Comment:     Non  GFR Calc  Starting 12/18/2018, serum creatinine based estimated GFR (eGFR) will be   calculated using the Chronic Kidney Disease Epidemiology Collaboration   (CKD-EPI) equation.       Calcium    Date Value Ref Range Status   10/02/2019 9.0 8.5 - 10.1 mg/dL Final     Lab Results   Component Value Date    CHOL 203 10/02/2019     Lab Results   Component Value Date    HDL 45 10/02/2019     Lab Results   Component Value Date     10/02/2019     Lab Results   Component Value Date    TRIG 93 10/02/2019     Lab Results   Component Value Date    CHOLHDLRATIO 4.8 01/04/2012     Cardiac data:    ECG today was personally reviewed and shows normal sinus rhythm at 62, normal axis and intervals. Non specific T wave abnormalities          Assessment and Plan   Preoperative clearance  Morbid obesity (H)  Hyperlipidemia  Patient certainly has risk factors for CAD: elevated LDL, family history and past smoking and morbid obesity. He has some shortness of breath on exertion and it is difficult to establish if this is anginal equivalent or COPD. EKG here was normal for no signs of ischemia at rest. His revised cardiac risk score would place him at Class II-III. Additionally, he had past history of cocaine use. Therefore it would be advisable to have patient undergo dobutamine echo stress test to address his cardiac function.  - Echo dobutamine   - PFTs per PCP         Jaden Hoyos, [facility]  Cardiology Fellow  Nov 11, 2019    Pt was seen and plan of care discussed with Brittny.         ATTENDING ATTESTATION:  This patient has been seen and examined by me November 11, 2019 with Jaden Hoyos MD, cardiology fellow. I have reviewed the vitals, laboratory and imaging data relevant to this patient's care. I have edited this note to reflect our joint assessment and plan, and discussed the plan with the patient.    Bill Wisdom is a 49 year old year old male with   Morbid obesity  Mixed dyslipidemia  Pre diabetes   COPD  Sleep apnea on CPAP     Patient is without overt angina or heart failure symptoms. Reports limited exercise tolerance mainly due to lower back pain and some exertional dyspnea works as a .  Family history  of coronary artery disease in his parents in their 60s.  He has a prior history of tobacco and cocaine use.  He is euvolemic on exam, blood pressure and heart rate are in normal range. ECG shows sinus rhythm. Recent labs reviewed, LDL-c 139 mg/dL, hemoglobin, renal and liver function within normal limits.    Based on his limited exercise tolerance and risk factor burden we will proceed with an outpatient dobutamine echocardiogram to assess for inducible ischemia.  Baseline echo will also help assess for pulmonary hypertension given the morbid obesity, COPD and sleep apnea. Patient will be notified of the results and a subsequent follow-up will be scheduled as needed. Healthy lifestyle was reinforced with the patient.       Addendum   dobutamine stress echo done today after the clinic visit was negative for any inducible ischemia.  He has no evidence of pulmonary hypertension and his biventricular function is normal. Patient is stable from a cardiac standpoint and at an acceptable CV risk to undergo the bariatric surgery and does not need further cardiac testing at this point.     Bianca Mart MD, MS  Staff Cardiologist  Pager: [phone]

## 2019-11-12 NOTE — PROGRESS NOTES
Social Work Intervention  [facility]    Data/Intervention:    Patient Name:  Bill Wisdom  /Age:  1970 (49 year old)    Visit Type: telephone  Referral Source: NP Amalia Hernandez in [facility] (Coverage for LUDY Wall)   Reason for Referral:  Housing, transportation     Collaborated With:    - Patient     Patient Concerns/Issues:   Received request to reach out to pt re: housing and transportation issues. Per chart review, pt is already working with a  and has a CADI CM, as well. [facility] SW Mae Bravo has worked with pt in the past, as well. See her notes for more details.     Called pt; introduced self and role of [facility] SW coverage for Mae Bravo, as well as reason for call. Pt stated that he has been working on housing and currently has an apartment, but it is on an upper floor and there is no elevator. Pt reported having mobility issues, which makes this difficult and is hoping to find a different apartment either on the first floor or with an elevator. Pt wondered if SW could assist with this. Explained clinic SW role, encouraged pt to continue working with his community supports that are specific to his housing on this. Noted that if pt needs any sort of medical documentation to help support his need for different housing accommodations, this would be something with which [facility] SW could assist. Pt expressed understanding of this. In regard to transportation, reviewed pt's insurance transportation benefits. Pt was aware of this and said that his main concern with transportation is getting to/from work. Pt has a bus pass through his CADI waiver, but reported that he started a new job and now would have to walk a block away to take a bus to the light rail and then the light rail to work. He reports that this will be too much for him with his mobility issues, especially in the winter (pt uses a cane). Encouraged pt to discuss his options for this with his CADI CM, as  there are some intricacies to what he can get since his waiver is currently paying for his bus pass. Asked for pt's CADI CM name/contact info if he had it. Pt said he was at work so couldn't get the info or take down mine. Agreed to email pt my contact info and noted he could respond to my email or call me with it, as pt was hoping this writer could talk with SW. Pt gave SW verbal permission to speak with his CADI CM if necessary. Pt had to get back to work so concluded call with plan for SW to send email with SW contact info; pt to contact SW back as needed.     Sent email to pt with SW contact info, reminder for him to send me CADI CM contact info and to ask his CADI CM about [employer] options.     Intervention/Education/Resources Provided:  - Brief needs/resources assessment  - Education on transportation options  - Referred pt back to [facility] housing & CADI CMs   - Education on clinic SW role & contact info     Assessment/Plan:  Pt was pleasant and receptive to SW, easily engaged, open to support. Pt appears to have a lot of supports in place (CADI CM, , therapist). Pt seemed to understand clinic SW role further after discussion and will be in touch if he needs any assistance advocating/medical documentation to help support his other needs/goals.     Provided patient/family with contact information and availability.    KATARINA Griffin, [facility]  Clinical   Outpatient Speciality Clinics   [facility] [place]  Ph. [phone]    NO LETTER

## 2019-11-13 ENCOUNTER — MEDICAL CORRESPONDENCE (OUTPATIENT)
Dept: HEALTH INFORMATION MANAGEMENT | Facility: CLINIC | Age: 49
End: 2019-11-13

## 2019-11-13 ENCOUNTER — PRE VISIT (OUTPATIENT)
Dept: SLEEP MEDICINE | Facility: CLINIC | Age: 49
End: 2019-11-13

## 2019-11-13 NOTE — TELEPHONE ENCOUNTER
"  1.  Reason for the visit:  Consult for surgery clearance and continuation of care for cpap  2.  Referring provider and clinic name:  Dr. [facility]  3.  Previous Sleep Doctor or Pulmonlogist (clinic name)?  Study done at [facility]  4.  Records, Procedures, Imaging, and Labs (see below)  KASANDRA needed        All NOTES from previous office visits that pertain to why they are being seen in the Sleep Center    Previous Sleep Studies, Chest CT, Echos and reports that pertain to why they are seeing Sleep Center    All Sleep records that have been done in the last 2 years that pertain to why they are seeing Sleep Center            Are they being seen for continuation of care for Cpap/Bipap/Avap/Trilogy/Dental Device? Cpap    If yes to above Who and Where was Device issued/currently getting supplies from?     Are you currently on \"Supplemental Oxygen\" during the day or night?                                                                                                                                                         Please remind pt to bring Cpap machine and ask to arrive 15 minutes early to appointment due traffic and congestion                                                 5. Pt Sleep Center Packet received Message left asking pt to arrive 30 minutes early to appointment if no packet received.        Yes: \"please make sure that you bring this to your appointment completed, either the doctor will not see you until this completed or you may be asked to reschedule your appointment.\"     No: mail or email to the pt and explain, \"please make sure that you bring this to your appointment completed, either the doctor will not see you until this completed or you may be asked to reschedule your appointment.\"     ~If pt coming early to fill packet out, ask that they come 30 minutes prior to their appointment~     6. Has the pt's medication list been updated and " "preferred pharmacy added?     7. Has the allergy list been reviewed?    \"Thank you for choosing [facility] and we look forward to seeing you at your upcoming appointment\"     "

## 2019-11-14 ENCOUNTER — THERAPY VISIT (OUTPATIENT)
Dept: PHYSICAL THERAPY | Facility: CLINIC | Age: 49
End: 2019-11-14
Payer: COMMERCIAL

## 2019-11-14 DIAGNOSIS — E66.01 MORBID OBESITY (H): ICD-10-CM

## 2019-11-14 DIAGNOSIS — M54.41 RIGHT-SIDED LOW BACK PAIN WITH RIGHT-SIDED SCIATICA: Primary | ICD-10-CM

## 2019-11-14 PROCEDURE — 97110 THERAPEUTIC EXERCISES: CPT | Mod: GP | Performed by: PHYSICAL THERAPIST

## 2019-11-14 PROCEDURE — 97112 NEUROMUSCULAR REEDUCATION: CPT | Mod: GP | Performed by: PHYSICAL THERAPIST

## 2019-11-14 PROCEDURE — 97140 MANUAL THERAPY 1/> REGIONS: CPT | Mod: GP | Performed by: PHYSICAL THERAPIST

## 2019-11-20 ENCOUNTER — OFFICE VISIT (OUTPATIENT)
Dept: SLEEP MEDICINE | Facility: CLINIC | Age: 49
End: 2019-11-20
Attending: PHYSICIAN ASSISTANT
Payer: COMMERCIAL

## 2019-11-20 VITALS
WEIGHT: 288 LBS | SYSTOLIC BLOOD PRESSURE: 111 MMHG | HEIGHT: 68 IN | BODY MASS INDEX: 43.65 KG/M2 | RESPIRATION RATE: 18 BRPM | DIASTOLIC BLOOD PRESSURE: 75 MMHG | OXYGEN SATURATION: 93 % | HEART RATE: 69 BPM

## 2019-11-20 DIAGNOSIS — E66.01 MORBID OBESITY (H): ICD-10-CM

## 2019-11-20 DIAGNOSIS — G47.10 HYPERSOMNIA: Primary | ICD-10-CM

## 2019-11-20 DIAGNOSIS — G47.30 SLEEP APNEA, UNSPECIFIED TYPE: ICD-10-CM

## 2019-11-20 PROCEDURE — 99205 OFFICE O/P NEW HI 60 MIN: CPT | Performed by: INTERNAL MEDICINE

## 2019-11-20 RX ORDER — ZOLPIDEM TARTRATE 10 MG/1
TABLET ORAL
Qty: 1 TABLET | Refills: 0 | Status: SHIPPED | OUTPATIENT
Start: 2019-11-20 | End: 2019-11-20

## 2019-11-20 RX ORDER — ZOLPIDEM TARTRATE 10 MG/1
TABLET ORAL
Qty: 1 TABLET | Refills: 0 | Status: SHIPPED | OUTPATIENT
Start: 2019-11-20 | End: 2020-01-23

## 2019-11-20 ASSESSMENT — MIFFLIN-ST. JEOR: SCORE: 2135.86
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
## 2019-11-20 NOTE — PATIENT INSTRUCTIONS
"MY TREATMENT INFORMATION FOR SLEEP APNEA-  Bill Wisdom    DOCTOR : Elizabeth Bustillos MD  SLEEP CENTER :      MY CONTACT NUMBER:     Am I having a sleep study at a sleep center?  Make sure you have an appointment for the study before you leave!    Am I having a home sleep study?  Watch this video:  [email or web address].Paxera.com/watch?v=CteI_[email or web address]&[email or web address]  Please verify your insurance coverage with your insurance carrier    Frequently asked questions:  1. What is Obstructive Sleep Apnea (CHANDLER)? CHANDLER is the most common type of sleep apnea. Apnea means, \"without breath.\"  Apnea is most often caused by narrowing or collapse of the upper airway as muscles relax during sleep.   Almost everyone has occasional apneas. Most people with sleep apnea have had brief interruptions at night frequently for many years.  The severity of sleep apnea is related to how frequent and severe the events are.   2. What are the consequences of CHANDLER? Symptoms include: feeling sleepy during the day, snoring loudly, gasping or stopping of breathing, trouble sleeping, and occasionally morning headaches or heartburn at night.  Sleepiness can be serious and even increase the risk of falling asleep while driving. Other health consequences may include development of high blood pressure and other cardiovascular disease in persons who are susceptible. Untreated CHANDLER  can contribute to heart disease, stroke and diabetes.   3. What are the treatment options? In most situations, sleep apnea is a lifelong disease that must be managed with daily therapy. Medications are not effective for sleep apnea and surgery is generally not considered until other therapies have been tried. Your treatment is your choice . Continuous Positive Airway (CPAP) works right away and is the therapy that is effective in nearly everyone. An oral device to hold your jaw forward is usually the next most reliable option. Other options include postioning " devices (to keep you off your back), weight loss, and surgery including a tongue pacing device. There is more detail about some of these options below.    Important tips for using CPAP and similar devices   Know your equipment:  CPAP is continuous positive airway pressure that prevents obstructive sleep apnea by keeping the throat from collapsing while you are sleeping. In most cases, the device is  smart  and can slowly self-adjusts if your throat collapses and keeps a record every day of how well you are treated-this information is available to you and your care team.  BPAP is bilevel positive airway pressure that keeps your throat open and also assists each breath with a pressure boost to maintain adequate breathing.  Special kinds of BPAP are used in patients who have inadequate breathing from lung or heart disease. In most cases, the device is  smart  and can slowly self-adjusts to assist breathing. Like CPAP, the device keeps a record of how well you are treated.  Your mask is your connection to the device. You get to choose what feels most comfortable and the staff will help to make sure if fits. Here: are some examples of the different masks that are available:       Key points to remember on your journey with sleep apnea:  1. Sleep study.  PAP devices often need to be adjusted during a sleep study to show that they are effective and adjusted right.  2. Good tips to remember: Try wearing just the mask during a quiet time during the day so your body adapts to wearing it. A humidifier is recommended for comfort in most cases to prevent drying of your nose and throat. Allergy medication from your provider may help you if you are having nasal congestion.  3. Getting settled-in. It takes more than one night for most of us to get used to wearing a mask. Try wearing just the mask during a quiet time during the day so your body adapts to wearing it. A humidifier is recommended for comfort in most cases. Our team  will work with you carefully on the first day and will be in contact within 4 days and again at 2 and 4 weeks for advice and remote device adjustments. Your therapy is evaluated by the device each day.   4. Use it every night. The more you are able to sleep naturally for 7-8 hours, the more likely you will have good sleep and to prevent health risks or symptoms from sleep apnea. Even if you use it 4 hours it helps. Occasionally all of us are unable to use a medical therapy, in sleep apnea, it is not dangerous to miss one night.   5. Communicate. Call our skilled team on the number provided on the first day if your visit for problems that make it difficult to wear the device. Over 2 out of 3 patients can learn to wear the device long-term with help from our team. Remember to call our team or your sleep providers if you are unable to wear the device as we may have other solutions for those who cannot adapt to mask CPAP therapy. It is recommended that you sleep your sleep provider within the first 3 months and yearly after that if you are not having problems.   6. Use it for your health. We encourage use of CPAP masks during daytime quiet periods to allow your face and brain to adapt to the sensation of CPAP so that it will be a more natural sensation to awaken to at night or during naps. This can be very useful during the first few weeks or months of adapting to CPAP though it does not help medically to wear CPAP during wakefulness and  should not be used as a strategy just to meet guidelines.  7. Take care of your equipment. Make sure you clean your mask and tubing using directions every day and that your filter and mask are replaced as recommended or if they are not working.     BESIDES CPAP, WHAT OTHER THERAPIES ARE THERE?    Positioning Device  Positioning devices are generally used when sleep apnea is mild and only occurs on your back.This example shows a pillow that straps around the waist. It may be appropriate  for those whose sleep study shows milder sleep apnea that occurs primarily when lying flat on one's back. Preliminary studies have shown benefit but effectiveness at home may need to be verified by a home sleep test. These devices are generally not covered by medical insurance.  Examples of devices that maintain sleeping on the back to prevent snoring and mild sleep apnea.    Belt type body positioner  Http://Post.Bid.Ship.Kinetic Social/    Electronic reminder  Http://nightshifttherapy.com/  Http://[email or web address].Kinetic Social.au/      Oral Appliance  What is oral appliance therapy?  An oral appliance device fits on your teeth at night like a retainer used after having braces. The device is made by a specialized dentist and requires several visits over 1-2 months before a manufactured device is made to fit your teeth and is adjusted to prevent your sleep apnea. Once an oral device is working properly, snoring should be improved. A home sleep test may be recommended at that time if to determine whether the sleep apnea is adequately treated.       Some things to remember:  -Oral devices are often, but not always, covered by your medical insurance. Be sure to check with your insurance provider.   -If you are referred for oral therapy, you will be given a list of specialized dentists to consider or you may choose to visit the Web site of the American Academy of Dental Sleep Medicine  -Oral devices are less likely to work if you have severe sleep apnea or are extremely overweight.     More detailed information  An oral appliance is a small acrylic device that fits over the upper and lower teeth  (similar to a retainer or a mouth guard). This device slightly moves jaw forward, which moves the base of the tongue forward, opens the airway, improves breathing for effective treat snoring and obstructive sleep apnea in perhaps 7 out of 10 people .  The best working devices are custom-made by a dental device  after a mold is made of the teeth  1, 2, 3.  When is an oral appliance indicated?  Oral appliance therapy is recommended as a first-line treatment for patients with primary snoring, mild sleep apnea, and for patients with moderate sleep apnea who prefer appliance therapy to use of CPAP4, 5. Severity of sleep apnea is determined by sleep testing and is based on the number of respiratory events per hour of sleep.   How successful is oral appliance therapy?  The success rate of oral appliance therapy in patients with mild sleep apnea is 75-80% while in patients with moderate sleep apnea it is 50-70%. The chance of success in patients with severe sleep apnea is 40-50%. The research also shows that oral appliances have a beneficial effect on the cardiovascular health of CHANDLER patients at the same magnitude as CPAP therapy7.  Oral appliances should be a second-line treatment in cases of severe sleep apnea, but if not completely successful then a combination therapy utilizing CPAP plus oral appliance therapy may be effective. Oral appliances tend to be effective in a broad range of patients although studies show that the patients who have the highest success are females, younger patients, those with milder disease, and less severe obesity. 3, 6.   Finding a dentist that practices dental sleep medicine  Specific training is available through the American Academy of Dental Sleep Medicine for dentists interested in working in the field of sleep. To find a dentist who is educated in the field of sleep and the use of oral appliances, near you, visit the Web site of the American Academy of Dental Sleep Medicine.    References  1. Sav, et al. Objectively measured vs self-reported compliance during oral appliance therapy for sleep-disordered breathing. Chest 2013; 144(5): 1689-7745.  2. Marcus et al. Objective measurement of compliance during oral appliance therapy for sleep-disordered breathing. Thorax 2013; 68(1): 91-96.  3. David et al. Mandibular  advancement devices in 620 men and women with CHANDLER and snoring: tolerability and predictors of treatment success. Chest 2004; 125: 6593-7968.  4. Mary Lou et al. Oral appliances for snoring and CHANDLER: a review. Sleep 2006; 29: 244-262.  5. Chad et al. Oral appliance treatment for CHANDLER: an update. J Clin Sleep Med 2014; 10(2): 215-227.  6. Ulysses et al. Predictors of OSAH treatment outcome. J Dent Res 2007; 86: 4340-8099.      Weight Loss:    Weight loss is a long-term strategy that may improve sleep apnea in some patients.      Surgery:    Surgery for obstructive sleep apnea is considered generally only when other therapies fail to work. Surgery may be discussed with you if you are having a difficult time tolerating CPAP and or when there is an abnormal structure that requires surgical correction.  Nose and throat surgeries often enlarge the airway to prevent collapse.  Most of these surgeries create pain for 1-2 weeks and up to half of the most common surgeries are not effective throughout life.  You should carefully discuss the benefits and drawbacks to surgery with your sleep provider and surgeon to determine if it is the best solution for you.   More information  Surgery for CHANDLER is directed at areas that are responsible for narrowing or complete obstruction of the airway during sleep.  There are a wide range of procedures available to enlarge and/or stabilize the airway to prevent blockage of breathing in the three major areas where it can occur: the palate, tongue, and nasal regions.  Successful surgical treatment depends on the accurate identification of the factors responsible for obstructive sleep apnea in each person.  A personalized approach is required because there is no single treatment that works well for everyone.  Because of anatomic variation, consultation with an examination by a sleep surgeon is a critical first step in determining what surgical options are best for each patient.  In some  cases, examination during sedation may be recommended in order to guide the selection of procedures.  Patients will be counseled about risks and benefits as well as the typical recovery course after surgery. Surgery is typically not a cure for a person s CHANDLER.  However, surgery will often significantly improve one s CHANDLER severity (termed  success rate ).  Even in the absence of a cure, surgery will decrease the cardiovascular risk associated with OSA7; improve overall quality of life8 (sleepiness, functionality, sleep quality, etc).      Palate Procedures:  Patients with CHANDLER often have narrowing of their airway in the region of their tonsils and uvula.  The goals of palate procedures are to widen the airway in this region as well as to help the tissues resist collapse.  Modern palate procedure techniques focus on tissue conservation and soft tissue rearrangement, rather than tissue removal.  Often the uvula is preserved in this procedure. Residual sleep apnea is common in patient after pharyngoplasty with an average reduction in sleep apnea events of 33%2.      Tongue Procedures:  ExamWhile patients are awake, the muscles that surround the throat are active and keep this region open for breathing. These muscles relax during sleep, allowing the tongue and other structures to collapse and block breathing.  There are several different tongue procedures available.  Selection of a tongue base procedure depends on characteristics seen on physical exam.  Generally, procedures are aimed at removing bulky tissues in this area or preventing the back of the tongue from falling back during sleep.  Success rates for tongue surgery range from 50-62%3.    Hypoglossal Nerve Stimulation:  Hypoglossal nerve stimulation has recently received approval from the [place] Food and Drug Administration for the treatment of obstructive sleep apnea.  This is based on research showing that the system was safe and effective in treating sleep  apnea6.  Results showed that the median AHI score decreased 68%, from 29.3 to 9.0. This therapy uses an implant system that senses breathing patterns and delivers mild stimulation to airway muscles, which keeps the airway open during sleep.  The system consists of three fully implanted components: a small generator (similar in size to a pacemaker), a breathing sensor, and a stimulation lead.  Using a small handheld remote, a patient turns the therapy on before bed and off upon awakening.    Candidates for this device must be greater than 22 years of age, have moderate to severe CHANDLER (AHI between 20-65), BMI less than 32, have tried CPAP/oral appliance without success, and have appropriate upper airway anatomy (determined by a sleep endoscopy performed by Dr. Marie).    Hypoglossal Nerve Stimulation Pathway:    The sleep surgeon s office will work with the patient through the insurance prior-authorization process (including communications and appeals).    Nasal Procedures:  Nasal obstruction can interfere with nasal breathing during the day and night.  Studies have shown that relief of nasal obstruction can improve the ability of some patients to tolerate positive airway pressure therapy for obstructive sleep apnea1.  Treatment options include medications such as nasal saline, topical corticosteroid and antihistamine sprays, and oral medications such as antihistamines or decongestants. Non-surgical treatments can include external nasal dilators for selected patients. If these are not successful by themselves, surgery can improve the nasal airway either alone or in combination with these other options.      Combination Procedures:  Combination of surgical procedures and other treatments may be recommended, particularly if patients have more than one area of narrowing or persistent positional disease.  The success rate of combination surgery ranges from 66-80%2,3.    References  1. Nato DOMÍNGUEZ. The Role of the Nose in  Snoring and Obstructive Sleep Apnoea: An Update.  Eur Arch Otorhinolaryngol. 2011; 268: 1365-73.  2.  Bridgett SM; Apoorva JA; Timbo JR; Pallanch JF; Milagro MB; Elder SG; Anton SUAREZ. Surgical modifications of the upper airway for obstructive sleep apnea in adults: a systematic review and meta-analysis. SLEEP 2010;33(10):8777-4702. Eryn BOWMAN. Hypopharyngeal surgery in obstructive sleep apnea: an evidence-based medicine review.  Arch Otolaryngol Head Neck Surg. 2006 Feb;132(2):206-13.  3. Evan YH1, Susan Y, Agustín SUJATHA. The efficacy of anatomically based multilevel surgery for obstructive sleep apnea. Otolaryngol Head Neck Surg. 2003 Oct;129(4):327-35.  4. Kezirian E, Goldberg A. Hypopharyngeal Surgery in Obstructive Sleep Apnea: An Evidence-Based Medicine Review. Arch Otolaryngol Head Neck Surg. 2006 Feb;132(2):206-13.  5. July SESAY et al. Upper-Airway Stimulation for Obstructive Sleep Apnea.  N Engl J Med. 2014 Jan 9;370(2):139-49.  6. Bing Y et al. Increased Incidence of Cardiovascular Disease in Middle-aged Men with Obstructive Sleep Apnea. Am J Respir Crit Care Med; 2002 166: 159-165  7. Hutton EM et al. Studying Life Effects and Effectiveness of Palatopharyngoplasty (SLEEP) study: Subjective Outcomes of Isolated Uvulopalatopharyngoplasty. Otolaryngol Head Neck Surg. 2011; 144: 623-631.              Your BMI is Body mass index is 43.79 kg/m .  Weight management is a personal decision.  If you are interested in exploring weight loss strategies, the following discussion covers the approaches that may be successful. Body mass index (BMI) is one way to tell whether you are at a healthy weight, overweight, or obese. It measures your weight in relation to your height.  A BMI of 18.5 to 24.9 is in the healthy range. A person with a BMI of 25 to 29.9 is considered overweight, and someone with a BMI of 30 or greater is considered obese. More than two-thirds of American adults are considered overweight or obese.  Being  overweight or obese increases the risk for further weight gain. Excess weight may lead to heart disease and diabetes.  Creating and following plans for healthy eating and physical activity may help you improve your health.  Weight control is part of healthy lifestyle and includes exercise, emotional health, and healthy eating habits. Careful eating habits lifelong are the mainstay of weight control. Though there are significant health benefits from weight loss, long-term weight loss with diet alone may be very difficult to achieve- studies show long-term success with dietary management in less than 10% of people. Attaining a healthy weight may be especially difficult to achieve in those with severe obesity. In some cases, medications, devices and surgical management might be considered.  What can you do?  If you are overweight or obese and are interested in methods for weight loss, you should discuss this with your provider.     Consider reducing daily calorie intake by 500 calories.     Keep a food journal.     Avoiding skipping meals, consider cutting portions instead.    Diet combined with exercise helps maintain muscle while optimizing fat loss. Strength training is particularly important for building and maintaining muscle mass. Exercise helps reduce stress, increase energy, and improves fitness. Increasing exercise without diet control, however, may not burn enough calories to loose weight.       Start walking three days a week 10-20 minutes at a time    Work towards walking thirty minutes five days a week     Eventually, increase the speed of your walking for 1-2 minutes at time    In addition, we recommend that you review healthy lifestyles and methods for weight loss available through the National Institutes of Health patient information sites:  http://win.niddk.nih.gov/publications/index.htm    And look into health and wellness programs that may be available through your health insurance provider,  employer, local community center, or [employer] club.    Weight management plan: Patient was referred to their PCP to discuss a diet and exercise plan.

## 2019-11-22 ENCOUNTER — MEDICAL CORRESPONDENCE (OUTPATIENT)
Dept: HEALTH INFORMATION MANAGEMENT | Facility: CLINIC | Age: 49
End: 2019-11-22

## 2019-12-04 ENCOUNTER — OFFICE VISIT (OUTPATIENT)
Dept: PHARMACY | Facility: CLINIC | Age: 49
End: 2019-12-04
Payer: COMMERCIAL

## 2019-12-04 ENCOUNTER — OFFICE VISIT (OUTPATIENT)
Dept: SURGERY | Facility: CLINIC | Age: 49
End: 2019-12-04
Payer: COMMERCIAL

## 2019-12-04 VITALS — WEIGHT: 286.2 LBS | BODY MASS INDEX: 43.52 KG/M2

## 2019-12-04 DIAGNOSIS — Z71.3 NUTRITIONAL COUNSELING: Primary | ICD-10-CM

## 2019-12-04 DIAGNOSIS — E66.01 MORBID OBESITY (H): ICD-10-CM

## 2019-12-04 DIAGNOSIS — E11.69 TYPE 2 DIABETES MELLITUS WITH OTHER SPECIFIED COMPLICATION, UNSPECIFIED WHETHER LONG TERM INSULIN USE (H): ICD-10-CM

## 2019-12-04 DIAGNOSIS — R73.03 PREDIABETES: ICD-10-CM

## 2019-12-04 PROCEDURE — 99207 ZZC NO CHARGE LOS: CPT | Performed by: PHARMACIST

## 2019-12-04 RX ORDER — TOPIRAMATE 100 MG/1
100 TABLET, FILM COATED ORAL
Qty: 30 TABLET | Refills: 3 | Status: SHIPPED | OUTPATIENT
Start: 2019-12-04

## 2019-12-04 NOTE — PROGRESS NOTES
Therapy Management:                                                    Bill Wisdom is a 49 year old male coming in for a therapy management visit.  He was referred to me from Anna Marie Bejarano PA-C. Patient is not covered for [facility] services, therefore Therapy Management of weight loss medications was completed today. Patient discussed he cannot pay out of pocket for [facility] visit.     Reason for Consult: Topiramate restart and Ozempic start check in and education.   *Patient had tooth pulled this AM and was very tired/groggy during today visits - answered were more one worded or short responses as difficult to talk.     Obesity: was taking Topiramate 75 mg once daily between dinner and bedtime, Ozempic 0.25 mg weekly. Followed by Anna Marie Bejarano PA-C, first seen 10/2/19 for Bariatric Consult. Patient is experiencing the follow side effects: none. Denies symptoms of low blood sugar since starting Ozempic. Just got job as , moving more. There are snacks at work, he has been struggling with this. He has a sleep study coming up, this will help him to get a CPAP. He states that he is still hungry at times and still making poor food choices at times. He is reducing portions.   Weight History: Previously seen by Dr. Billy for possible weight loss surgery, but moved to [place]. He moved back to [place] 5 months ago. Lived in shelter for 2 months when just moved back to [place]. History of cocaine abuse, intermittent but stopped in Jan 2019, no requirement of alcohol/drug treatment previously.   Reason for wanting weight loss surgery: wanting to lose weight to be around for grandchildren and due to back pain.  Diet/Eating Habits: Patient reports his income is limited. He is still eating fast food for lunch, 1/2 the order of normal. Not getting soda anymore. He states that he is not snacking in the middle of the night anymore. Working with dietitian today to assist with further dietary changes. Quit smoking and drinking Jan 2019.    Exercise: Patient reports activity is limited, walks with cane.     Weight today: 295 lb 3.2 oz   Initial Consult Weight 10/2/19: 292 lb 12.8 oz  Weight loss goal prior to surgery: 282 lb     Creatinine   Date Value Ref Range Status   10/02/2019 0.84 0.66 - 1.25 mg/dL Final     GFR Estimate   Date Value Ref Range Status   10/02/2019 >90 >60 mL/min/[1.73_m2] Final     Comment:     Non  GFR Calc  Starting 12/18/2018, serum creatinine based estimated GFR (eGFR) will be   calculated using the Chronic Kidney Disease Epidemiology Collaboration   (CKD-EPI) equation.       GFR Estimate If Black   Date Value Ref Range Status   10/02/2019 >90 >60 mL/min/[1.73_m2] Final     Comment:      GFR Calc  Starting 12/18/2018, serum creatinine based estimated GFR (eGFR) will be   calculated using the Chronic Kidney Disease Epidemiology Collaboration   (CKD-EPI) equation.       Potassium   Date Value Ref Range Status   10/02/2019 4.0 3.4 - 5.3 mmol/L Final     Lab Results   Component Value Date    A1C 6.5 10/02/2019     Discussion: Appears from discussion with dietitian and from patient today that he has made some changes toward preparing for surgery but still has several improvements to make from a nutrition/dietary perspective before surgery would be appropriate. This was discussed by both pharmacist and dietitian today. Patient would benefit from continuing to work with dietitian and following up with Anna Marie Bejarano PA-C as already scheduled to follow up on progress. Will see if maximization of medications will assist with this. For this reason increasing Ozempic and topiramate today as different side effect profiles and hoping to assist with satiety and limiting brain hunger to help to make better food choices.     Plan:  1. Increase to Ozempic 0.5 mg weekly.   2. Increase topiramate to 100 mg at dinnertime.   3. Follow up with dietitian and Anna Marie Bejarano PA-C in 1 month. Should get BMP repeat at  that time.     Lauren Bloch, PharmD  Medication Therapy Management Pharmacist   [facility]   Phone: [phone]

## 2019-12-04 NOTE — PATIENT INSTRUCTIONS
Recommendations from today's MTM visit:                                                    MTM (medication therapy management) is a service provided by a clinical pharmacist designed to help you get the most of out of your medicines.   Today we reviewed what your medicines are for, how to know if they are working, that your medicines are safe and how to make your medicine regimen as easy as possible.     1. Increase topiramate to 100 mg daily at dinner.     2. Increase Ozempic to 0.5 mg weekly.     It was great to speak with you today.  I value your experience and would be very thankful for your time with providing feedback on our clinic survey. You may receive a survey via email or text message in the next few days.     Next MTM visit: 2 months     To schedule another MTM appointment, please call the clinic directly or you may call the MTM scheduling line at [phone] or toll-free at [phone].     My Clinical Pharmacist's contact information:                                                      It was a pleasure talking with you today!  Please feel free to contact me with any questions or concerns you have.      Lauren Bloch, PharmD  Medication Therapy Management Pharmacist   [facility]   Phone: [phone]

## 2019-12-04 NOTE — PATIENT INSTRUCTIONS
"GOALS:  Relating To Eating:  - Eat slowly (20-30 minutes per meal), chewing foods well (25 chews per bite/applesauce consistency)  - Eat 3 meals per day. Use 9\" Plate method (1/2 plate non-starchy vegetables/fruit, 1/4 plate lean protein, 1/4 plate whole grain starch - no more than 1/2 cup carb/meal)  - Avoid snacking   - reduce cookies/snacks at work to 1 day/week    *Protein Shake Criteria: no more than 210 Calories, at least 20 grams of protein, and less than 10 grams of sugar     Meal Replacement Shake Options:   [facility] smoothie (160 Calories, 20 g protein)   Premier Protein (160 Calories, 30 g protein)  Slim Fast Advanced Nutrition (180 Calories, 20 g protein)  Muscle Milk, lactose-free, 17 oz bottle (210 Calories, 30 g protein)  Integrated Supplements, no artificial sugars (110 Calories, 20 g protein)  Elevation Protein Powder    Meal Replacement Bar Options:  [facility] Protein Shake (160 Calories, 15 g protein)  Quest Protein Bars (190 Calories, 20 g protein)  Built Bar (170 Calories, 15-20 g protein)  One Protein Bar (210 calories, 20 g protein)  [place] Signature Protein Bar ([employer]) (190 Calories, 21 g protein)  Pure Protein Bars (180 Calories, 21 g protein)    Frozen Meal Replacements  Healthy Choice  Lean Cuisine  Atkins Meals  Smart Ones    Relating to beverages:  - Separate fluids from meals by 30 minutes before, during, and after eating  - Avoid calorie-containing beverages - Avoid juice and 2% milk  - Drink 48-64 ounces of fluid per day    Relating to dietary supplements:  - Start a multivitamin containing iron daily    Relating to activity:  - Increase activity as able    Relating to cravings:  - Practice alternatives to emotion eating    Please call Eusebia (ph: [phone] or send a [employer] message) within the month to update your Tasklist and prepare for the next steps.     Call Maciel at [phone] to discuss weight loss surgery date, and related appointments.      Follow up with ALTAF in " one month    Gayathrise Juan Francisco MS, RD, LD  If you need to schedule or reschedule with a dietitian please call [phone].

## 2019-12-04 NOTE — PROGRESS NOTES
"Bariatric Nutrition Consultation Note    Reason For Visit: Nutrition Assessment    Bill Wisdom is a 49 year old presenting today for bariatric nutrition consult.   Pt is interested in laparoscopic sleeve gastrectomy with Dr. Billy expected surgery in Jan 2020. Still needs pre-op teaching.  Patient is accompanied by self.  This is pt's 3rd of first of 3 required nutrition visits prior to surgery.     Pt referred by MEGA Guerra on October 2, 2019.  Patient with Co-morbidities of obesity including:   Dyslipidemia yes  Joint pain yes  Back pain yes  GERD yes     Support System Reviewed With Patient 10/2/2019   Who do you have in your support network that can be available to help you for the first 2 weeks after surgery? girlfriend   Who can you count on for support throughout your weight loss surgery journey? girlfriend   Reason for wanting weight loss surgery: \"Wants to be able to have energy, and exercise/move without pain\". He also wants to be around for his grandchildren.    ANTHROPOMETRICS:  Estimated body mass index is 43.52 kg/m  as calculated from the following:    Height as of 11/20/19: 1.727 m (5' 8\").    Weight as of this encounter: 129.8 kg (286 lb 3.2 oz). (-2.5 lbs over past month) - shoes on    Required weight loss goal pre-op: 10 lbs from initial consult weight (goal weight 282 lbs or less before surgery)       10/2/2019   I have tried the following methods to lose weight Exercise, Atkins type diet (low carb/high protein), Physician directed program       Weight Loss Questions Reviewed With Patient 10/2/2019   How long have you been overweight? Since late 20's to early 40's       SUPPLEMENT INFORMATION:  MVI daily, vitamin D3 5000 international unit(s) daily  Milk thistle is listed in his med list but he is no longer taking per his report    NUTRITION HISTORY:  Recall Diet Questions Reviewed With Patient 12/4/2019   Describe what you typically consume for breakfast (typical or most recent): Turkey " "sausage,eggs, banana   Describe what you typically consume for lunch (typical or most recent): 1/2 cheeseburger; split fries   Describe what you typically consume for supper (typical or most recent): Part of a turkey sandwich and a couple chips - eating chips less often than he used to (2-3x/week vs every day)   Describe what you typically consume as snacks (typical or most recent): Chips, cookies - all day   How many ounces of water, or other low calorie drinks, do you drink daily (8 oz=1 glass)? 24 oz - water   How many ounces of caffeine (coffee, tea, pop) do you drink daily (8 oz=1 glass)? 8 oz - does not drink coffee   How many ounces of carbonated (pop, beer, sparkling water) drinks do you drinky daily (8 oz=1 glass)? 8 oz   How many ounces of juice, pop, sweet tea, sports drinks, protein drinks, other sweetened drinks, do you drink daily (8 oz=1 glass)? 8 oz    How many ounces of milk do you drink daily (8 oz=1 glass) 8 oz - 16oz   Please indicate the type of milk: 2% or skim   How often do you drink alcohol? Never   Pt notes he is trying to cut back on snacking and emotional eating, but is struggling at times.  Wakes up in the middle of the night and snacks.    Eating Habits 10/2/2019   Do you have any dietary restrictions? No   Do you currently binge eat (eat a large amount of food in a short time)? Yes   Are you an emotional eater? Yes   Do you get up to eat after falling asleep? Yes   What foods do you crave? none     PROGRESS WITH PREVIOUS GOALS:  Relating To Eating:  - Eat slowly (20-30 minutes per meal), chewing foods well (25 chews per bite/applesauce consistency)  - Continues - just had tooth removed this morning so this may be difficult for a while  - Eat 3 meals per day. Use 9\" Plate method (1/2 plate non-starchy vegetables/fruit, 1/4 plate lean protein, 1/4 plate whole grain starch - no more than 1/2 cup carb/meal)  - Continues  - Avoid snacking  - Continues - he's having a difficult time avoiding " "cookies and snacks that are provided at work    Relating to beverages:  - Separate fluids from meals by 30 minutes before, during, and after eating  - Continues - not doing as often as he should be  - Avoid calorie-containing beverages - Avoid juice and 2% milk  - Continues - cutting down on soda and juice  - Drink 48-64 ounces of fluid per day  - Meeting    Relating to dietary supplements:  - Start a multivitamin containing iron daily   - Meeting    Relating to activity:  - Increase activity as able  - Continues - limited with cane but moving more at work lately    Relating to cravings:  - Practice alternatives to emotion eating  - Continues - trying to cut down on snacking on cookies/chips      ADDITIONAL INFORMATION:  - 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 Since then pt has gained 50 lbs. Pt finds it difficult to go to grocery store and avoid fried foods in deli. Pt recognizes that he has developed emotional eating habits. Eating to find pleasure, and not necessarily hungry.   - Can receive \"Mom's meals,\" home-delivered meals.   - Works for [employer] - snacks abundant at work.   - Was trying to drink green smoothie with PB in the morning -  But felt like it wasn't enough.   - Not currently using his CPAP (does not have it anymore - family member has it?).   - Recently got a job as  at the [employer] a couple weeks ago.    Dining Out History Reviewed With Patient 10/2/2019   How often do you dine out? Nearly every day.   Where do you dine out? (select all that apply) fast food chains- [employer], [employer], chinese buffet   What types of food do you order when you dine out? chicken       Physical Activity Reviewed With Patient 10/2/2019   How often do you exercise? Less than 1 time per week   What is the duration of your exercise (in minutes)? 10 Minutes    What types of exercise do you do? walking, gym membership   What keeps you from being more active?  I am as " active as I can possbily be, Pain, Shortness of breath, Lack of Time, Too tired     MALNUTRITION - no changes  % Intake: No decreased intake noted  % Weight Loss: None noted  Subcutaneous Fat Loss: None observed  Muscle Loss: None observed  Fluid Accumulation/Edema: None noted  Malnutrition Diagnosis: Patient does not meet two of the above criteria necessary for diagnosing malnutrition    NUTRITION DIAGNOSIS:  Obesity r/t long history of self-monitoring deficit and excessive energy intake aeb BMI >30 kg/m2. - Continues    INTERVENTION:  Reviewed post-op diet guidelines, vitamins/minerals essential post-operatively, GI anatomy of bariatric surgeries, ways to help prepare for post-op diet guidelines pre-operatively, portion/calorie-control, mindful eating and sources of protein. Reviewed fluid needs and beverage intake after surgery. Reviewed information on protein shake/bar meal replacements and frozen meal replacements. Encouraged him to replace his [employer] hamburger and fries lunch with a frozen meal replacement or a protein shake. Pt noted that he feels confident he can make these changes after surgery. Discussed with him that he needs to show he is making these changes before surgery before RD will give approval for surgery (such as  fluids from meals, chewing foods well, ensuring he has protein at each meal, etc). Performed teach back strategies to help him understand how much protein and fluids he needs in a day after surgery. Also encouraged him to avoid snacks at work and at least try to limit to no more than 1 day/week. Provided encouragement and support. Provided pt with list of goals RD contact information.      Questions Reviewed With Patient 10/2/2019   How ready are you to make changes regarding your weight? Number 1 = Not ready at all to make changes up to 10 = very ready. 10   How confident are you that you can change? 1 = Not confident that you will be successful making changes up to 10 =  "very confident. 10       Patient Understanding: good  Expected Compliance: good    GOALS:  Relating To Eating:  - Eat slowly (20-30 minutes per meal), chewing foods well (25 chews per bite/applesauce consistency)  - Eat 3 meals per day. Use 9\" Plate method (1/2 plate non-starchy vegetables/fruit, 1/4 plate lean protein, 1/4 plate whole grain starch - no more than 1/2 cup carb/meal)  - Avoid snacking   - reduce cookies/snacks at work to 1 day/week    *Protein Shake Criteria: no more than 210 Calories, at least 20 grams of protein, and less than 10 grams of sugar     Meal Replacement Shake Options:   [facility] smoothie (160 Calories, 20 g protein)   Premier Protein (160 Calories, 30 g protein)  Slim Fast Advanced Nutrition (180 Calories, 20 g protein)  Muscle Milk, lactose-free, 17 oz bottle (210 Calories, 30 g protein)  Integrated Supplements, no artificial sugars (110 Calories, 20 g protein)  Elevation Protein Powder    Meal Replacement Bar Options:  [facility] Protein Shake (160 Calories, 15 g protein)  Quest Protein Bars (190 Calories, 20 g protein)  Built Bar (170 Calories, 15-20 g protein)  One Protein Bar (210 calories, 20 g protein)  [place] Signature Protein Bar ([employer]) (190 Calories, 21 g protein)  Pure Protein Bars (180 Calories, 21 g protein)    Frozen Meal Replacements  Healthy Choice  Lean Cuisine  Atkins Meals  Smart Ones    Relating to beverages:  - Separate fluids from meals by 30 minutes before, during, and after eating  - Avoid calorie-containing beverages - Avoid juice and 2% milk  - Drink 48-64 ounces of fluid per day    Relating to dietary supplements:  - Start a multivitamin containing iron daily    Relating to activity:  - Increase activity as able    Relating to cravings:  - Practice alternatives to emotion eating    Please call Eusebia (ph: [phone] or send a [employer] message) within the month to update your Tasklist and prepare for the next steps.     Call Maciel at [phone] to " discuss weight loss surgery date, and related appointments.      Time spent with patient: 30 minutes.  Gayathri Chicas MS, RD, LD

## 2019-12-04 NOTE — LETTER
"12/4/2019       RE: Bill Wisdom  [address]  [place] [address]     Dear Colleague,    Thank you for referring your patient, Bill Wisdom, to the [facility] at [facility]. Please see a copy of my visit note below.    Bariatric Nutrition Consultation Note    Reason For Visit: Nutrition Assessment    Bill Wisdom is a 49 year old presenting today for bariatric nutrition consult.   Pt is interested in laparoscopic sleeve gastrectomy with Dr. Billy expected surgery in Jan 2020. Still needs pre-op teaching.  Patient is accompanied by self.  This is pt's 3rd of first of 3 required nutrition visits prior to surgery.     Pt referred by MEGA Guerra on October 2, 2019.  Patient with Co-morbidities of obesity including:   Dyslipidemia yes  Joint pain yes  Back pain yes  GERD yes     Support System Reviewed With Patient 10/2/2019   Who do you have in your support network that can be available to help you for the first 2 weeks after surgery? girlfriend   Who can you count on for support throughout your weight loss surgery journey? girlfriend   Reason for wanting weight loss surgery: \"Wants to be able to have energy, and exercise/move without pain\". He also wants to be around for his grandchildren.    ANTHROPOMETRICS:  Estimated body mass index is 43.52 kg/m  as calculated from the following:    Height as of 11/20/19: 1.727 m (5' 8\").    Weight as of this encounter: 129.8 kg (286 lb 3.2 oz). (-2.5 lbs over past month) - shoes on    Required weight loss goal pre-op: 10 lbs from initial consult weight (goal weight 282 lbs or less before surgery)       10/2/2019   I have tried the following methods to lose weight Exercise, Atkins type diet (low carb/high protein), Physician directed program       Weight Loss Questions Reviewed With Patient 10/2/2019   How long have you been overweight? Since late 20's to early 40's       SUPPLEMENT " INFORMATION:  MVI daily, vitamin D3 5000 international unit(s) daily  Milk thistle is listed in his med list but he is no longer taking per his report    NUTRITION HISTORY:  Recall Diet Questions Reviewed With Patient 12/4/2019   Describe what you typically consume for breakfast (typical or most recent): Turkey sausage,eggs, banana   Describe what you typically consume for lunch (typical or most recent): 1/2 cheeseburger; split fries   Describe what you typically consume for supper (typical or most recent): Part of a turkey sandwich and a couple chips - eating chips less often than he used to (2-3x/week vs every day)   Describe what you typically consume as snacks (typical or most recent): Chips, cookies - all day   How many ounces of water, or other low calorie drinks, do you drink daily (8 oz=1 glass)? 24 oz - water   How many ounces of caffeine (coffee, tea, pop) do you drink daily (8 oz=1 glass)? 8 oz - does not drink coffee   How many ounces of carbonated (pop, beer, sparkling water) drinks do you drinky daily (8 oz=1 glass)? 8 oz   How many ounces of juice, pop, sweet tea, sports drinks, protein drinks, other sweetened drinks, do you drink daily (8 oz=1 glass)? 8 oz    How many ounces of milk do you drink daily (8 oz=1 glass) 8 oz - 16oz   Please indicate the type of milk: 2% or skim   How often do you drink alcohol? Never   Pt notes he is trying to cut back on snacking and emotional eating, but is struggling at times.  Wakes up in the middle of the night and snacks.    Eating Habits 10/2/2019   Do you have any dietary restrictions? No   Do you currently binge eat (eat a large amount of food in a short time)? Yes   Are you an emotional eater? Yes   Do you get up to eat after falling asleep? Yes   What foods do you crave? none     PROGRESS WITH PREVIOUS GOALS:  Relating To Eating:  - Eat slowly (20-30 minutes per meal), chewing foods well (25 chews per bite/applesauce consistency)  - Continues - just had tooth  "removed this morning so this may be difficult for a while  - Eat 3 meals per day. Use 9\" Plate method (1/2 plate non-starchy vegetables/fruit, 1/4 plate lean protein, 1/4 plate whole grain starch - no more than 1/2 cup carb/meal)  - Continues  - Avoid snacking  - Continues - he's having a difficult time avoiding cookies and snacks that are provided at work    Relating to beverages:  - Separate fluids from meals by 30 minutes before, during, and after eating  - Continues - not doing as often as he should be  - Avoid calorie-containing beverages - Avoid juice and 2% milk  - Continues - cutting down on soda and juice  - Drink 48-64 ounces of fluid per day  - Meeting    Relating to dietary supplements:  - Start a multivitamin containing iron daily   - Meeting    Relating to activity:  - Increase activity as able  - Continues - limited with cane but moving more at work lately    Relating to cravings:  - Practice alternatives to emotion eating  - Continues - trying to cut down on snacking on cookies/chips      ADDITIONAL INFORMATION:  - 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 Since then pt has gained 50 lbs. Pt finds it difficult to go to grocery store and avoid fried foods in deli. Pt recognizes that he has developed emotional eating habits. Eating to find pleasure, and not necessarily hungry.   - Can receive \"Mom's meals,\" home-delivered meals.   - Works for [employer] - snacks abundant at work.   - Was trying to drink green smoothie with PB in the morning -  But felt like it wasn't enough.   - Not currently using his CPAP (does not have it anymore - family member has it?).   - Recently got a job as  at the [employer] a couple weeks ago.    Dining Out History Reviewed With Patient 10/2/2019   How often do you dine out? Nearly every day.   Where do you dine out? (select all that apply) fast food chains- [employer], [employer], chinese buffet   What types of food do you order " when you dine out? chicken       Physical Activity Reviewed With Patient 10/2/2019   How often do you exercise? Less than 1 time per week   What is the duration of your exercise (in minutes)? 10 Minutes    What types of exercise do you do? walking, gym membership   What keeps you from being more active?  I am as active as I can possbily be, Pain, Shortness of breath, Lack of Time, Too tired     MALNUTRITION - no changes  % Intake: No decreased intake noted  % Weight Loss: None noted  Subcutaneous Fat Loss: None observed  Muscle Loss: None observed  Fluid Accumulation/Edema: None noted  Malnutrition Diagnosis: Patient does not meet two of the above criteria necessary for diagnosing malnutrition    NUTRITION DIAGNOSIS:  Obesity r/t long history of self-monitoring deficit and excessive energy intake aeb BMI >30 kg/m2. - Continues    INTERVENTION:  Reviewed post-op diet guidelines, vitamins/minerals essential post-operatively, GI anatomy of bariatric surgeries, ways to help prepare for post-op diet guidelines pre-operatively, portion/calorie-control, mindful eating and sources of protein. Reviewed fluid needs and beverage intake after surgery. Reviewed information on protein shake/bar meal replacements and frozen meal replacements. Encouraged him to replace his [employer] hamburger and fries lunch with a frozen meal replacement or a protein shake. Pt noted that he feels confident he can make these changes after surgery. Discussed with him that he needs to show he is making these changes before surgery before RD will give approval for surgery (such as  fluids from meals, chewing foods well, ensuring he has protein at each meal, etc). Performed teach back strategies to help him understand how much protein and fluids he needs in a day after surgery. Also encouraged him to avoid snacks at work and at least try to limit to no more than 1 day/week. Provided encouragement and support. Provided pt with list of goals RD  "contact information.      Questions Reviewed With Patient 10/2/2019   How ready are you to make changes regarding your weight? Number 1 = Not ready at all to make changes up to 10 = very ready. 10   How confident are you that you can change? 1 = Not confident that you will be successful making changes up to 10 = very confident. 10       Patient Understanding: good  Expected Compliance: good    GOALS:  Relating To Eating:  - Eat slowly (20-30 minutes per meal), chewing foods well (25 chews per bite/applesauce consistency)  - Eat 3 meals per day. Use 9\" Plate method (1/2 plate non-starchy vegetables/fruit, 1/4 plate lean protein, 1/4 plate whole grain starch - no more than 1/2 cup carb/meal)  - Avoid snacking   - reduce cookies/snacks at work to 1 day/week    *Protein Shake Criteria: no more than 210 Calories, at least 20 grams of protein, and less than 10 grams of sugar     Meal Replacement Shake Options:   [facility] smoothie (160 Calories, 20 g protein)   Premier Protein (160 Calories, 30 g protein)  Slim Fast Advanced Nutrition (180 Calories, 20 g protein)  Muscle Milk, lactose-free, 17 oz bottle (210 Calories, 30 g protein)  Integrated Supplements, no artificial sugars (110 Calories, 20 g protein)  Elevation Protein Powder    Meal Replacement Bar Options:  [facility] Protein Shake (160 Calories, 15 g protein)  Quest Protein Bars (190 Calories, 20 g protein)  Built Bar (170 Calories, 15-20 g protein)  One Protein Bar (210 calories, 20 g protein)  [place] Signature Protein Bar ([employer]) (190 Calories, 21 g protein)  Pure Protein Bars (180 Calories, 21 g protein)    Frozen Meal Replacements  Healthy Choice  Lean Cuisine  Atkins Meals  Smart Ones    Relating to beverages:  - Separate fluids from meals by 30 minutes before, during, and after eating  - Avoid calorie-containing beverages - Avoid juice and 2% milk  - Drink 48-64 ounces of fluid per day    Relating to dietary supplements:  - Start a multivitamin " containing iron daily    Relating to activity:  - Increase activity as able    Relating to cravings:  - Practice alternatives to emotion eating    Please call [employer] (ph: [phone] or send a [employer] message) within the month to update your Tasklist and prepare for the next steps.     Call Maciel at [phone] to discuss weight loss surgery date, and related appointments.      Time spent with patient: 30 minutes.  Gayathri Chicas MS, ALTAF, LD    Again, thank you for allowing me to participate in the care of your patient.      Sincerely,    Gayathri Chicas RD

## 2019-12-09 ENCOUNTER — THERAPY VISIT (OUTPATIENT)
Dept: PHYSICAL THERAPY | Facility: CLINIC | Age: 49
End: 2019-12-09
Payer: COMMERCIAL

## 2019-12-09 DIAGNOSIS — M54.41 RIGHT-SIDED LOW BACK PAIN WITH RIGHT-SIDED SCIATICA: ICD-10-CM

## 2019-12-09 PROCEDURE — 97112 NEUROMUSCULAR REEDUCATION: CPT | Mod: GP | Performed by: PHYSICAL THERAPIST

## 2019-12-09 PROCEDURE — 97140 MANUAL THERAPY 1/> REGIONS: CPT | Mod: GP | Performed by: PHYSICAL THERAPIST

## 2019-12-12 ENCOUNTER — THERAPY VISIT (OUTPATIENT)
Dept: SLEEP MEDICINE | Facility: CLINIC | Age: 49
End: 2019-12-12
Payer: COMMERCIAL

## 2019-12-12 DIAGNOSIS — E66.01 MORBID OBESITY (H): ICD-10-CM

## 2019-12-12 DIAGNOSIS — G47.10 HYPERSOMNIA: ICD-10-CM

## 2019-12-12 DIAGNOSIS — G47.33 OSA (OBSTRUCTIVE SLEEP APNEA): ICD-10-CM

## 2019-12-12 DIAGNOSIS — G47.30 SLEEP APNEA, UNSPECIFIED TYPE: ICD-10-CM

## 2019-12-12 PROCEDURE — 95810 POLYSOM 6/> YRS 4/> PARAM: CPT | Performed by: INTERNAL MEDICINE

## 2019-12-12 NOTE — Clinical Note
Severe sleep apnea needs titration PSG-ordered, please coordinate. Let me know if he wasn't sleep aid again. ThanksTC

## 2019-12-13 ENCOUNTER — CARE COORDINATION (OUTPATIENT)
Dept: SURGERY | Facility: CLINIC | Age: 49
End: 2019-12-13

## 2019-12-13 DIAGNOSIS — E55.9 VITAMIN D DEFICIENCY: Primary | ICD-10-CM

## 2019-12-13 NOTE — LETTER
2019      RE: Bill Wisdom  [address]  [place] [address]         To whom it may concern:    Bill is planning to have surgery on 2020.  Initial restrictions with lifting are 20 lbs for 4 weeks.   He will be seen in clinic at 1 week and 1 month.    Feel free to contact me if you have any questions.  Any paperwork needed for work can be faxed to [phone].   We will send it to the clinic nurse to review and complete with the provider.    Sincerely,  PAT Laws MS, RN  Surgical Weight Management Nurse Coordinator   Phone: [phone]  Fax: [phone]  Contact Center Nurse Line and Clinic [phone]

## 2019-12-13 NOTE — LETTER
2019      TO: Bill Alyx  [address]  [place] [address]         Dear Mr. Bill Wisdom,    Here is a copy of your tasklist.  Feel free to contact me if you have any questions.    Sincerely,  Eusebia, RN    Shani Dhaliwal, MS, RN  Surgical Weight Management Nurse Coordinator   Phone: [phone]  Fax: [phone]  Contact Center Nurse Line and Clinic [phone]  Bariatric Task List  Status:  Is patient a candidate for bariatric surgery?:  patient is a candidate for bariatric surgery -     Cleared to schedule surgeon consult?:    -     Status:  surgery evaluation in process -     Surgeon: Mariajose -     Temoative surgery month/year: Plan for 2020 -        Insurance: Insurance:  [facility], [place] -        Patient Info: Initial Weight:  292 -     Date of Initial Weight/Height:  10/2/2019 -     Goal Weight (lbs):  282 -     Required Weight Loss:  10 -     Surgery Type:  sleeve gastrectomy -        Dietician Visits: Structured weight loss required by insurance?:  structured weight loss required -     Dietician Visit 1:  Completed - 2019   Dietician Visit 2:  Completed - 2019   Dietician Visit 3:  Completed - 19 appt   Dietician Visit 4:  Needed - 19 appt - if not at goal weight and to review the postop diet.      Psychological Evaluation: Psych eval:  Needed - 19 Dr Major appt   Therapist letter of support:  Completed - Domonique CASTELLANOS at [facility], 19 ltr of support.   Psychiatrist letter of support:  Needed - Juju Piper [facility] - 19 To request it next week.      Lab Work: Complete Blood Count:  Completed -     Comprehensive Metabolic Panel:  Completed -     Vitamin D:  Needed - 10/2/19 vitamin D =8, repeat with PTH after treatment   Hgb A1c:  Completed -     PTH:  Needed -     Nicotine Testing:  Completed - 19 negative   Other:  Completed - lipids      Consults/ Clearance: Sleep Medicine:  Needed -  "11/20 Giuliano Giles-White   Cardiac:  Completed - 11/11/19 Cards Bianca Grimaldoheath - cleared.   Medical Weight Management: Completed - started topiramate with Anna Marie 10/2/19 ramp to 75mg, 1/2 appt with Anna Marie -ongoing.   Physical Therapy/Exercise:  Needed -        PCP: Establish care with PCP:  Completed -     Follow up with PCP:    -     PCP letter of support:  Completed - 10/03/19 Amalia Hernandez, APRN, CNP - ltr in Epic      Smoking: Quit tobacco use (3 months smoke free)?:  Completed -     Quit date:  1/1/2019 -        Patient Education:  Information Session:  Completed -     Given \"Making your decision\" handout?:  Given \"\"Making your decision\"\" handout? -     Given support group information?:  support group contact information provided -     Attended support group?:    -     Support plan in place?:  Completed -     Research consents signed?:  research consent signed -        Final Tasks:  Before surgery online class:  Needed -     Before surgery online class website link:  [email or web address]   After surgery online class:  Needed -     After surgery online class website link:  [email or web address]   Nurse visit for weigh-in and information:  Needed -     Pre-assessment clinic visit with anesthesia team for H&P:  Needed -     Final labs (Hgb, plt, T&S, UA):  Needed -        Notes:   -         I will send you a letter for work and the mental health nurse separately.  Thanks,  Eusebia  "

## 2019-12-13 NOTE — PROGRESS NOTES
Diagnostic PSG completed per provider order.  Patient did not meet criteria for PAP therapy.    Transcutaneous C02 monitoring.    Arterial puncture.  Failed to obtain a blood sample.        ROS      Physical Exam

## 2019-12-13 NOTE — Clinical Note
Bariatric Task List  Status:    Is patient a candidate for bariatric surgery?:  patient is a candidate for bariatric surgery -    Cleared to schedule surgeon consult?:    -    Status:  surgery evaluation in process -    Surgeon: Mariajose -    Cheryl surgery month/year: Plan for 2/25/2020 -      Insurance:   Insurance:  [facility], [place] -      Patient Info:   Initial Weight:  292 -    Date of Initial Weight/Height:  10/2/2019 -    Goal  Weight (lbs):  282 -    Required Weight Loss:  10 -    Surgery Type:  sleeve gastrectomy -      Dietician Visits:   Structured weight loss required by insurance?:  structured weight loss required -    Dietician Visit 1:  Completed - October 2019  Dietician Visit 2:  Completed - November 2019  Dietician Visit 3:  Completed - 12/4/19 appt  Dietician Visit 4:  Needed - 1/2/19 appt - if not at goal weight and to review the postop  diet.    Psychological Evaluation:   Psych eval:  Needed - 12/19/19 Dr Major appt  Therapist letter of support:  Completed - Domonique CASTELLANOS at [facility], 11/22/19 ltr of support.  Psychiatrist letter of support:  Needed - Juju Piper PMHNP - 12/13/19 To request it next week.    Lab Work:   Complete Blood Count:  Completed -    Comprehensive Metabolic Panel:  Completed -    Vitamin D:  Needed  - 10/2/19 vitamin D =8, repeat with PTH after treatment  Hgb A1c:  Completed -    PTH:  Needed -    Nicotine Testing:  Completed - 11/4/19 negative  Other:  Completed - lipids    Consults/ Clearance:   Sleep Medicine:  Needed - 11/20 Giuliano Myronba-White  Cardiac:  Completed - 11/11/19 Daniel Mart - cleared.  Medical Weight Management: Completed - started topiramate with Anna Marie 10/2/19 ramp to 75mg, 1/2 appt with Anna Marie - ongoing.  Physical Therapy/Exercise:  Needed -      PCP:   Establish care with PCP:  Completed -    Follow up with PCP:    -    PCP letter of support:  Completed - 10/03/19 Amalia Hernandez, APRN, CNP - ltr in  "Epic    Smoking:   Quit tobacco use (3 months smoke free)?:  Completed -    Quit date:  1/1/2019 -      Patient Education:    Information Session:  Completed -    Given \"Making your decision\" handout?:  Given \"\"Makin g your decision\"\" handout? -    Given support group information?:  support group contact information provided -    Attended support group?:    -    Support plan in place?:  Completed -    Research consents signed?:  research consent signed -      Final Tasks:    Before surgery online class:  Needed -    Before surgery online class website link:  [email or web address]  After surgery online class:  Needed -    A fter surgery online class website link:  [email or web address]  Nurse visit for weigh-in and information:  Needed -    Pre-assessment clinic visit with anesthesia team for H&P:  Needed -    Final labs (Hgb, plt, T&S, UA):  Needed -      Notes:   -        "

## 2019-12-13 NOTE — PROGRESS NOTES
Tasklist reviewed with patient and a letter mailed to home. Does not use MyChart.    Bariatric Task List  Status:  Is patient a candidate for bariatric surgery?:  patient is a candidate for bariatric surgery -     Cleared to schedule surgeon consult?:    -     Status:  surgery evaluation in process -     Surgeon: Mariajose Plascenciaative surgery month/year: Plan for 2/25/2020 -  Sent patient a letter to give work with this date of surgery.      Insurance: Insurance:  [facility], [place] -        Patient Info: Initial Weight:  292 -     Date of Initial Weight/Height:  10/2/2019 -     Goal Weight (lbs):  282 -     Required Weight Loss:  10 -     Surgery Type:  sleeve gastrectomy -        Dietician Visits: Structured weight loss required by insurance?:  structured weight loss required -     Dietician Visit 1:  Completed - October 2019   Dietician Visit 2:  Completed - November 2019   Dietician Visit 3:  Completed - 12/4/19 appt   Dietician Visit 4:  Needed - 1/2/19 appt - if not at goal weight and to review the postop diet.      Psychological Evaluation: Psych eval:  Needed - 12/19/19 Dr Major appt   Therapist letter of support:  Completed - Domonique CASTELLANOS at [facility], 11/22/19 ltr of support.   Psychiatrist letter of support:  Needed - Juju Piper [facility] - 12/13/19 To request it next week. 12/13/19 Sent pt a letter to give Juju.      Lab Work: Complete Blood Count:  Completed -     Comprehensive Metabolic Panel:  Completed -     Vitamin D:  Needed - 10/2/19 vitamin D =8, repeat with PTH after treatment   Hgb A1c:  Completed -     PTH:  Needed -     Nicotine Testing:  Completed - 11/4/19 negative   Other:  Completed - lipids      Consults/ Clearance: Sleep Medicine:  Needed - 11/20 Giuliano Giles-White   Cardiac:  Completed - 11/11/19 Daniel Mart - cleared.   Medical Weight Management: Completed - started topiramate with Anna Marie 10/2/19 ramp to 75mg, 1/2 appt with Anna Marie -ongoing.   Physical  "Therapy/Exercise:  Needed -        PCP: Establish care with PCP:  Completed -     Follow up with PCP:    -     PCP letter of support:  Completed - 10/03/19 JUNIOR Hayden, CNP - ltr in Epic      Smoking: Quit tobacco use (3 months smoke free)?:  Completed -     Quit date:  1/1/2019 -        Patient Education:  Information Session:  Completed -     Given \"Making your decision\" handout?:  Given \"\"Making your decision\"\" handout? -     Given support group information?:  support group contact information provided -     Attended support group?:    -     Support plan in place?:  Completed -     Research consents signed?:  research consent signed -        Final Tasks:  Before surgery online class:  Needed -     Before surgery online class website link:  [email or web address]   After surgery online class:  Needed -     After surgery online class website link:  [email or web address]   Nurse visit for weigh-in and information:  Needed -     Pre-assessment clinic visit with anesthesia team for H&P:  Needed -     Final labs (Hgb, plt, T&S, UA):  Needed -        Notes:   -           "

## 2019-12-13 NOTE — LETTER
RE: Bill Wisdom                                                               2019   [address]  [place] [address]  : 1970  PH: [phone]     Dear Juju Piper, [facility],             We have a mutual patient, Bill Wisdom, : 1970,  with morbid obesity who is considering undergoing bariatric surgery.  A psychological evaluation is being done, 19 by Dr Miroslava Major, to see if this patient is cleared from a psychological perspective to undergo the elective surgery.  However, we need your assistance with a letter of support as outlined below. Your input is valuable and will affect our decision to hold or proceed with bariatric surgery.    This letter of support should outline:  1. Summary of treatment to date.  2. Treatment goals for before and after surgery that indicate mental health support and continuation of care.  3. Any concerns regarding this patient s ability to follow through with treatment recommendations.    4. If known, documentation of cessation of illicit drug use (our policy requires patients to be drug free of illicit drugs for at least one year prior to surgery).   5.        A statement that indicates if you support or do not support this patient for weight loss surgery.    If any questions, feel free to call Shani Dhaliwal RN at [phone].  Please fax the letter of support to [phone], attn: Shani Dhaliwal.    Sincerely,  Dr. Norberto Billy,  Dr Wili Mark,  Phoebe García, CNP  Dr. Trae Jean-Baptiste,  Anna Marie Bejarano, PAWillC  [facility]  [address], [facility] [address], [facility] [place] [place] [address]

## 2019-12-16 PROBLEM — G47.33 OSA (OBSTRUCTIVE SLEEP APNEA): Status: ACTIVE | Noted: 2019-12-16

## 2019-12-16 LAB — SLPCOMP: NORMAL

## 2019-12-16 NOTE — PROCEDURES
" SLEEP STUDY INTERPRETATION  DIAGNOSTIC POLYSOMNOGRAPHY REPORT      Patient: NANCY ROSSI  YOB: 1970  Study Date: 12/12/2019  MRN: [id number]  Referring Provider: Anna Marie Bejarano PA-C  Ordering Provider: Elizabeth Hahn MD    Indications for Polysomnography: The patient is a 49 y old Male who is 5' 8\" and weighs 288.0 lbs. His BMI is 44.2, [place] sleepiness scale 20.0 and neck circumference is 46.0 cm. Relevant medical history includes COPD, diabetes, insomnia, CHANDLER. A diagnostic polysomnogram was performed to evaluate for sleep apnea/hypoventilation/hypoxemia.    Polysomnogram Data: A full night polysomnogram recorded the standard physiologic parameters including EEG, EOG, EMG, ECG, nasal and oral airflow. Respiratory parameters of chest and abdominal movements were recorded with respiratory inductance plethysmography. Oxygen saturation was recorded by pulse oximetry. Hypopnea scoring rule used: 1B 4%.    Sleep Architecture: Normal sleep efficiency with increased arousal index and delay to REM sleep.  The total recording time of the polysomnogram was 379.5 minutes. The total sleep time was 357.5 minutes. Sleep latency was normal at 4.8 minutes with the use of a sleep aid (zolpidem 10 mg). REM latency was 211.5 minutes. Arousal index was increased at 24.0 arousals per hour. Sleep efficiency was normal at 94.2%. Wake after sleep onset was 17.0 minutes. The patient spent 11.0% of total sleep time in Stage N1, 83.4% in Stage N2, 0.0% in Stage N3, and 5.6% in REM. Time in REM supine was 2.5 minutes.    Respiration: Severe obstructive sleep apnea with hypoxemia and hypoventilation.    Events ? The polysomnogram revealed a presence of 73 obstructive, 0 central, and 1 mixed apneas resulting in an apnea index of 12.4 events per hour. There were 120 obstructive hypopneas and 0 central hypopneas resulting in an obstructive hypopnea index of 20.1 and central hypopnea index of 0 events per hour. The combined " apnea/hypopnea index was 32.6 events per hour (central apnea/hypopnea index was 0 events per hour). The REM AHI was 66.0 events per hour. The supine AHI was 32.1 events per hour. The RERA index was 1.8 events per hour.  The RDI was 34.4 events per hour.    Snoring - was reported as loud.    Respiratory rate and pattern - was notable for normal respiratory rate and pattern.    Sustained Sleep Associated Hypoventilation - Transcutaneous carbon dioxide monitoring was used, and significant hypoventilation was present with a maximum change from 45.5 to 59.5 mmHg and 215.1 minutes at or greater than 55 mmHg. (ABG ordered but not obtained)    Sleep Associated Hypoxemia - (Greater than 5 minutes O2 sat at or below 88%) was present. Baseline oxygen saturation was 92.1%. Lowest oxygen saturation was 72.1%. Time spent less than or equal to 88% was 13.4 minutes. Time spent less than or equal to 89% was 21.8 minutes.    Movement Activity: No abnormal movement activity.    Periodic Limb Activity - There were 0 PLMs during the entire study.    REM EMG Activity - Excessive transient/sustained muscle activity was not present.    Nocturnal Behavior - Abnormal sleep related behaviors were not noted..    Bruxism - None apparent.      Cardiac Summary: Normal sinus rhythm and rates.  The average pulse rate was 59.2 bpm. The minimum pulse rate was 42.9 bpm while the maximum pulse rate was 91.1 bpm.  Arrhythmias were not noted.      Assessment:     Severe obstructive sleep apnea with AHI 32.6 events per hour and hypoxemia and hypoventilation.    Normal sleep efficiency with increased arousal index and delay to REM sleep.    No abnormal movement activity.    Normal sinus rhythm and rates.    Recommendations:    Recommend repeat polysomnography with full night titration of positive airway pressure therapy for the control of sleep disordered breathing.    Advice regarding the risks of drowsy driving.    Suggest optimizing sleep schedule and  avoiding sleep deprivation.    Weight management (BMI > 30).    Pharmacologic therapy should be used for management of restless legs syndrome only if present and clinically indicated and not based on the presence of periodic limb movements alone.    Diagnostic Codes:   Obstructive Sleep Apnea G47.33  Sleep Hypoxemia/Hypoventilation G47.36         _____________________________________   Electronically Signed By: Elizabeth Bustillos MD 12/16/2019

## 2019-12-18 ENCOUNTER — THERAPY VISIT (OUTPATIENT)
Dept: SLEEP MEDICINE | Facility: CLINIC | Age: 49
End: 2019-12-18
Payer: COMMERCIAL

## 2019-12-18 DIAGNOSIS — G47.33 OSA (OBSTRUCTIVE SLEEP APNEA): ICD-10-CM

## 2019-12-18 PROCEDURE — 95811 POLYSOM 6/>YRS CPAP 4/> PARM: CPT | Performed by: INTERNAL MEDICINE

## 2019-12-18 RX ORDER — ZOLPIDEM TARTRATE 10 MG/1
TABLET ORAL
Qty: 1 TABLET | Refills: 0 | Status: SHIPPED | OUTPATIENT
Start: 2019-12-18 | End: 2020-01-23

## 2019-12-19 ENCOUNTER — OFFICE VISIT (OUTPATIENT)
Dept: NEUROPSYCHOLOGY | Facility: CLINIC | Age: 49
End: 2019-12-19
Payer: COMMERCIAL

## 2019-12-19 DIAGNOSIS — F54 PSYCHOLOGICAL FACTORS AFFECTING MEDICAL CONDITION: Primary | ICD-10-CM

## 2019-12-19 DIAGNOSIS — E66.01 MORBID OBESITY (H): ICD-10-CM

## 2019-12-19 NOTE — PROCEDURES
" SLEEP STUDY INTERPRETATION  TITRATION STUDY      Patient: NANCY ROSSI  Date of Birth: [birth year]  Study Date: 12/18/19  MRN: [id number]  Referring Provider: Anna Marie Bejarano PA-C  Ordering Provider: Elizabeth Bustillos MD    Indications for Polysomnography: The patient is a 49 y old Male who is 5' 8\" and weighs 286.0 lbs. His BMI is 43.9, [place] sleepiness scale 20.0 and neck circumference is 52.0 cm. Relevant medical history includes COPD, diabetes, insomnia, CHANDLER (recent PSG 12/12/2019 AHI 32.6 with hypoxemia and hypoventilation). A polysomnogram with PAP titration was performed to correct for sleep apnea/hypoventilation/hypoxemia.    Polysomnogram Data: A full night polysomnogram recorded the standard physiologic parameters including EEG, EOG, EMG, ECG, nasal and oral airflow. Respiratory parameters of chest and abdominal movements were recorded with respiratory inductance plethysmography. Oxygen saturation was recorded by pulse oximetry. Hypopnea scoring rule used: 1B 4%.    Treatment PSG  Sleep Architecture: Normal sleep efficiency and arousal index with delay to REM sleep on PAP therapy.  The total recording time of the polysomnogram was 426.9 minutes. The total sleep time was 377.0 minutes. Sleep latency was decreased at 0.3 minutes with the use of a sleep aid (zolpidem 10 mg). REM latency was delayed at 237.0 minutes. Arousal index was normal at 7.6 arousals per hour. Sleep efficiency was normal at 88.3%. Wake after sleep onset was 17.0 minutes. The patient spent 2.5% of total sleep time in Stage N1, 71.4% in Stage N2, 17.9% in Stage N3, and 8.2% in REM. Time in REM supine was 31.0 minutes.    Respiration: While using wedge pillow to elevated HOB 30 degrees, CPAP [id number] effective resolved sleep disordered breathing.    The patient was titrated at pressures ranging from CPAP 7 cmH2O up to CPAP 10 cmH2O. The final pressure achieved was CPAP 10 cmH2O with a residual AHI of 1.1 events per hour. Time in REM supine on " final pressure was 13.0 minutes. This titration was considered optimal (residual AHI < 5 events per hour and including REM supine sleep at final pressure).     Snoring - was reported as absent on final treatment settings.    Respiratory rate and pattern - was notable for normal respiratory rate and pattern.    Sustained Sleep Associated Hypoventilation - Transcutaneous carbon dioxide monitoring was used, however significant hypoventilation was not present with a maximum change from 33.9 to 50.5 mmHg and 0 minutes at or greater than 55 mmHg.    Sleep Associated Hypoxemia - (Greater than 5 minutes O2 sat at or below 88%) was not present. Baseline oxygen saturation was 92.4%. Lowest oxygen saturation was 81.9%. Time spent less than or equal to 88% was 1.5 minutes. Time spent less than or equal to 89% was 3.7 minutes.    Movement Activity: No abnormal movement activity.    Periodic Limb Activity - There were 0 PLMs during the entire study.     REM EMG Activity - Excessive transient/sustained muscle activity was not present.    Nocturnal Behavior - Abnormal sleep related behaviors were not noted.    Bruxism - None apparent.    Cardiac Summary: Normal sinus rhythm and rates.  The average pulse rate was 60.4 bpm. The minimum pulse rate was 44.9 bpm while the maximum pulse rate was 90.6 bpm. Arrhythmias were not noted.        Assessment:     While using wedge pillow to elevated HOB 30 degrees, CPAP [id number] effective resolved sleep disordered breathing.    Normal sleep efficiency and arousal index with delay to REM sleep on PAP therapy.    No abnormal movement activity.    Normal sinus rhythm and rates.    Recommendations:    Treatment of CHANDLER with CPAP at 10 cmH2O with HOB elevation 30 degrees. Recommend clinical follow up with sleep management team, for coaching and review of effectiveness and compliance measures.    Advice regarding the risks of drowsy driving.    Suggest optimizing sleep schedule and avoiding sleep  deprivation.    Weight management (BMI > 30).    Pharmacologic therapy should be used for management of restless legs syndrome only if present and clinically indicated and not based on the presence of periodic limb movements alone.    Diagnostic Codes:   Obstructive Sleep Apnea G47.33       _____________________________________   Electronically Signed By: Elizabeth Bustillos MD 12/19/2019

## 2019-12-19 NOTE — NURSING NOTE
Pt was seen for neuropsychological evaluation at the request of Anna Marie Bejarano PA-C for the purposes of diagnostic clarification and treatment planning. 30 minutes of test administration and scoring were provided by this writer. Please see Dr. Miroslava Major's report for a full interpretation of the findings.    Abelardo You  Psychometrist

## 2019-12-19 NOTE — PROGRESS NOTES
Completed a all night titration PSG per provider order.    Preliminary AHI 32.6.  A final therapeutic PAP pressure was achieved.    Supine REM was seen on therapeutic pressure.    Patient reports feeling refreshed in AM.    Transcutaneous C02 monitoring.        ROS      Physical Exam

## 2019-12-20 LAB — SLPCOMP: NORMAL

## 2019-12-23 ENCOUNTER — PREP FOR PROCEDURE (OUTPATIENT)
Dept: SURGERY | Facility: CLINIC | Age: 49
End: 2019-12-23

## 2020-01-03 ENCOUNTER — PREP FOR PROCEDURE (OUTPATIENT)
Dept: SURGERY | Facility: CLINIC | Age: 50
End: 2020-01-03

## 2020-01-03 DIAGNOSIS — E66.01 MORBID OBESITY (H): Primary | ICD-10-CM

## 2020-01-04 ENCOUNTER — HOSPITAL ENCOUNTER (INPATIENT)
Facility: CLINIC | Age: 50
Setting detail: SURGERY ADMIT
End: 2020-01-04
Attending: SURGERY | Admitting: SURGERY
Payer: COMMERCIAL

## 2020-01-04 DIAGNOSIS — E66.01 MORBID OBESITY (H): ICD-10-CM

## 2020-01-09 ENCOUNTER — TELEPHONE (OUTPATIENT)
Dept: ANESTHESIOLOGY | Facility: CLINIC | Age: 50
End: 2020-01-09

## 2020-01-09 ENCOUNTER — CARE COORDINATION (OUTPATIENT)
Dept: SURGERY | Facility: CLINIC | Age: 50
End: 2020-01-09

## 2020-01-09 ENCOUNTER — TELEPHONE (OUTPATIENT)
Dept: SURGERY | Facility: CLINIC | Age: 50
End: 2020-01-09

## 2020-01-09 ENCOUNTER — OFFICE VISIT (OUTPATIENT)
Dept: SURGERY | Facility: CLINIC | Age: 50
End: 2020-01-09
Payer: COMMERCIAL

## 2020-01-09 VITALS — BODY MASS INDEX: 44.16 KG/M2 | WEIGHT: 290.4 LBS

## 2020-01-09 DIAGNOSIS — G89.29 CHRONIC BILATERAL LOW BACK PAIN WITH RIGHT-SIDED SCIATICA: ICD-10-CM

## 2020-01-09 DIAGNOSIS — Z71.3 NUTRITIONAL COUNSELING: Primary | ICD-10-CM

## 2020-01-09 DIAGNOSIS — E66.01 MORBID OBESITY (H): ICD-10-CM

## 2020-01-09 DIAGNOSIS — M53.3 SACROILIAC JOINT DYSFUNCTION OF RIGHT SIDE: ICD-10-CM

## 2020-01-09 DIAGNOSIS — E55.9 VITAMIN D DEFICIENCY: ICD-10-CM

## 2020-01-09 DIAGNOSIS — M54.41 CHRONIC BILATERAL LOW BACK PAIN WITH RIGHT-SIDED SCIATICA: ICD-10-CM

## 2020-01-09 RX ORDER — GLUCOSAMINE HCL 500 MG
1 TABLET ORAL DAILY
Qty: 30 TABLET | Refills: 11 | Status: SHIPPED | OUTPATIENT
Start: 2020-01-09 | End: 2021-07-09

## 2020-01-09 RX ORDER — BIOTIN 10 MG
1 TABLET ORAL 2 TIMES DAILY
Qty: 60 TABLET | Refills: 11 | Status: CANCELLED | OUTPATIENT
Start: 2020-01-09

## 2020-01-09 RX ORDER — IBUPROFEN 600 MG/1
600 TABLET, FILM COATED ORAL 4 TIMES DAILY PRN
Qty: 120 TABLET | Refills: 0 | Status: SHIPPED | OUTPATIENT
Start: 2020-01-09 | End: 2020-03-17

## 2020-01-09 NOTE — PROGRESS NOTES
"Bariatric Nutrition Consultation Note    Reason For Visit: Nutrition Assessment    Bill Wisdom is a 49 year old presenting today for bariatric nutrition consult.   Pt is interested in laparoscopic sleeve gastrectomy with Dr. Billy expected surgery in April 2020. Pt would like to wait until April so he can save up some more PTO days for work for post-op. Still needs pre-op teaching.  Patient is accompanied by self.  This is pt's 4th of 3 required nutrition visits prior to surgery.     Pt referred by MEGA Guerra on October 2, 2019.  Patient with Co-morbidities of obesity including:   Dyslipidemia yes  Joint pain yes  Back pain yes  GERD yes     Support System Reviewed With Patient 10/2/2019   Who do you have in your support network that can be available to help you for the first 2 weeks after surgery? girlfriend   Who can you count on for support throughout your weight loss surgery journey? girlfriend   Reason for wanting weight loss surgery: \"Wants to be able to have energy, and exercise/move without pain\". He also wants to be around for his grandchildren.    ANTHROPOMETRICS:  Estimated body mass index is 44.16 kg/m  as calculated from the following:    Height as of 11/20/19: 1.727 m (5' 8\").    Weight as of this encounter: 131.7 kg (290 lb 6.4 oz). - boots on (+4.2 lbs over past month)    Required weight loss goal pre-op: 10 lbs from initial consult weight (goal weight 282 lbs or less before surgery)       10/2/2019   I have tried the following methods to lose weight Exercise, Atkins type diet (low carb/high protein), Physician directed program       Weight Loss Questions Reviewed With Patient 10/2/2019   How long have you been overweight? Since late 20's to early 40's       SUPPLEMENT INFORMATION:  MVI daily, vitamin D3 5000 international unit(s) daily  Milk thistle is listed in his med list but he is no longer taking per his report    NUTRITION HISTORY:  Recall Diet Questions Reviewed With Patient " "12/4/2019   Describe what you typically consume for breakfast (typical or most recent): Turkey hilliard 3 pieces + 1 ww toast   Describe what you typically consume for lunch (typical or most recent): Granola bar   Describe what you typically consume for supper (typical or most recent): Fried chicken from [facility] deli or chicken pot pie   Describe what you typically consume as snacks (typical or most recent): Few lately   How many ounces of water, or other low calorie drinks, do you drink daily (8 oz=1 glass)? 24 oz - water   How many ounces of caffeine (coffee, tea, pop) do you drink daily (8 oz=1 glass)? 8 oz - does not drink coffee   How many ounces of carbonated (pop, beer, sparkling water) drinks do you drinky daily (8 oz=1 glass)? 8 oz   How many ounces of juice, pop, sweet tea, sports drinks, protein drinks, other sweetened drinks, do you drink daily (8 oz=1 glass)? 8 oz    How many ounces of milk do you drink daily (8 oz=1 glass) 8 oz - 16oz   Please indicate the type of milk: 2% or skim   How often do you drink alcohol? Never   Pt notes he is trying to cut back on snacking and emotional eating, but is struggling at times.  Wakes up in the middle of the night and snacks.    Gout flare recently (1/9/20)  Choosing some high fat high sodium foods for meals still (fried chicken from deli at [facility] recently)    Labs:  10/2/19: 8 (L)     Eating Habits 10/2/2019   Do you have any dietary restrictions? No   Do you currently binge eat (eat a large amount of food in a short time)? Yes   Are you an emotional eater? Yes   Do you get up to eat after falling asleep? Yes   What foods do you crave? none     PROGRESS WITH PREVIOUS GOALS:  Relating To Eating:  - Eat slowly (20-30 minutes per meal), chewing foods well (25 chews per bite/applesauce consistency)  - Continues/improving  - Eat 3 meals per day. Use 9\" Plate method (1/2 plate non-starchy vegetables/fruit, 1/4 plate lean protein, 1/4 plate whole grain starch - no more than 1/2 " "cup carb/meal)  - Not meeting  - Avoid snacking  - Continues/improving   - reduce cookies/snacks at work to 1 day/week    Relating to beverages:  - Separate fluids from meals by 30 minutes before, during, and after eating  - Continues  - Avoid calorie-containing beverages - Avoid juice and 2% milk  - Continues/improving  - Drink 48-64 ounces of fluid per day  - Continues    Relating to dietary supplements:  - Start a multivitamin containing iron daily  - Meeting    Relating to activity:  - Increase activity as able  - Continues - walking at work    Relating to cravings:  - Practice alternatives to emotion eating  - Continues    ADDITIONAL INFORMATION:  - 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 Since then pt has gained 50 lbs. Pt finds it difficult to go to grocery store and avoid fried foods in deli. Pt recognizes that he has developed emotional eating habits. Eating to find pleasure, and not necessarily hungry.   - Can receive \"Mom's meals,\" home-delivered meals.   - Works for [employer] - snacks abundant at work.   - Was trying to drink green smoothie with PB in the morning -  But felt like it wasn't enough.   - Not currently using his CPAP (does not have it anymore - family member has it?).   - Recently got a job as  at the [employer] a couple weeks ago.    Dining Out History Reviewed With Patient 10/2/2019   How often do you dine out? Nearly every day.   Where do you dine out? (select all that apply) fast food chains- [employer], [employer], chinese buffet   What types of food do you order when you dine out? chicken       Physical Activity Reviewed With Patient 10/2/2019   How often do you exercise? Less than 1 time per week   What is the duration of your exercise (in minutes)? 10 Minutes    What types of exercise do you do? walking, gym membership   What keeps you from being more active?  I am as active as I can possbily be, Pain, Shortness of breath, Lack of Time, " Too tired     MALNUTRITION - no changes  % Intake: No decreased intake noted  % Weight Loss: None noted  Subcutaneous Fat Loss: None observed  Muscle Loss: None observed  Fluid Accumulation/Edema: None noted  Malnutrition Diagnosis: Patient does not meet two of the above criteria necessary for diagnosing malnutrition    NUTRITION DIAGNOSIS:  Obesity r/t long history of self-monitoring deficit and excessive energy intake aeb BMI >30 kg/m2. - Continues    INTERVENTION:  Reviewed post-op diet guidelines, vitamins/minerals essential post-operatively, GI anatomy of bariatric surgeries, ways to help prepare for post-op diet guidelines pre-operatively, portion/calorie-control, mindful eating and sources of protein. Reviewed fluid needs and beverage intake after surgery. Reviewed information on protein shake/bar meal replacements and frozen meal replacements. Encouraged him to try replacing granola bar at lunch with a protein shake. Also showed him pictures of the protein shakes and frozen meal replacements. Encouraged him to replace pot pie frozen dinner with a Healthy Choice or Lean Cuisine + adding extra non-starchy vegetables (frozen, microwavable). Patient discussed he'd like to wait until April 2020 for surgery so he can save up more PTO days at work. RD agreed this would be a good idea so he can also continue to work towards weight loss goal and goals for after surgery. Pt requested prescriptions for multivitamin and vitamin D3 be refilled. RD ordered vitamin D3 3,000 international unit(s). Discussed vitamin D lab with team and ordered follow up lab for next time patient is in clinic. Did not order multivitamin given pt has more refills.  Provided encouragement and support. Provided pt with list of goals RD contact information.      Questions Reviewed With Patient 10/2/2019   How ready are you to make changes regarding your weight? Number 1 = Not ready at all to make changes up to 10 = very ready. 10   How confident  "are you that you can change? 1 = Not confident that you will be successful making changes up to 10 = very confident. 10       Patient Understanding: good  Expected Compliance: good    GOALS:  Relating To Eating:  - Eat slowly (20-30 minutes per meal), chewing foods well (25 chews per bite/applesauce consistency)  - Eat 3 meals per day. Use 9\" Plate method (1/2 plate non-starchy vegetables/fruit, 1/4 plate lean protein, 1/4 plate whole grain starch - no more than 1/2 cup carb/meal)  - Avoid snacking   - reduce cookies/snacks at work to 1 day/week    *Protein Shake Criteria: no more than 210 Calories, at least 20 grams of protein, and less than 10 grams of sugar   *Buy a shaker/ bottle for protein shakes    Meal Replacement Shake Options:   [facility] smoothie (160 Calories, 20 g protein)   Premier Protein (160 Calories, 30 g protein)  Slim Fast Advanced Nutrition (180 Calories, 20 g protein)  Muscle Milk, lactose-free, 17 oz bottle (210 Calories, 30 g protein)  Integrated Supplements, no artificial sugars (110 Calories, 20 g protein)  Elevation Protein Powder at [employer]    Meal Replacement Bar Options:  [facility] Protein Shake (160 Calories, 15 g protein)  Quest Protein Bars (190 Calories, 20 g protein)  Built Bar (170 Calories, 15-20 g protein)  One Protein Bar (210 calories, 20 g protein)  [place] Signature Protein Bar ([employer]) (190 Calories, 21 g protein)  Pure Protein Bars (180 Calories, 21 g protein)    Frozen Meal Replacements  Healthy Choice  Lean Cuisine  Atkins Meals  Smart Ones    Relating to beverages:  - Separate fluids from meals by 30 minutes before, during, and after eating  - Avoid calorie-containing beverages - Avoid juice and 2% milk  - Drink 48-64 ounces of fluid per day    Relating to dietary supplements:  - Start a multivitamin containing iron daily    Relating to activity:  - Increase activity as able    Relating to cravings:  - Practice alternatives to emotion eating    Please call " Eusebia (ph: [phone] or send a [employer] message) within the month to update your Tasklist and prepare for the next steps.     Call Maciel at [phone] to discuss weight loss surgery date, and related appointments.      Time spent with patient: 30 minutes.  Gayathri Chicas MS, RD, LD

## 2020-01-09 NOTE — PATIENT INSTRUCTIONS
"GOALS:  Relating To Eating:  - Eat slowly (20-30 minutes per meal), chewing foods well (25 chews per bite/applesauce consistency)  - Eat 3 meals per day. Use 9\" Plate method (1/2 plate non-starchy vegetables/fruit, 1/4 plate lean protein, 1/4 plate whole grain starch - no more than 1/2 cup carb/meal)  - Avoid snacking   - reduce cookies/snacks at work to 1 day/week    *Protein Shake Criteria: no more than 210 Calories, at least 20 grams of protein, and less than 10 grams of sugar   *Buy a shaker/ bottle for protein shakes    Meal Replacement Shake Options:   [facility] smoothie (160 Calories, 20 g protein)   Premier Protein (160 Calories, 30 g protein)  Slim Fast Advanced Nutrition (180 Calories, 20 g protein)  Muscle Milk, lactose-free, 17 oz bottle (210 Calories, 30 g protein)  Integrated Supplements, no artificial sugars (110 Calories, 20 g protein)  Elevation Protein Powder at [employer]    Meal Replacement Bar Options:  [facility] Protein Shake (160 Calories, 15 g protein)  Quest Protein Bars (190 Calories, 20 g protein)  Built Bar (170 Calories, 15-20 g protein)  One Protein Bar (210 calories, 20 g protein)  [place] Signature Protein Bar ([employer]) (190 Calories, 21 g protein)  Pure Protein Bars (180 Calories, 21 g protein)    Frozen Meal Replacements  Healthy Choice  Lean Cuisine  Atkins Meals  Smart Ones    Relating to beverages:  - Separate fluids from meals by 30 minutes before, during, and after eating  - Avoid calorie-containing beverages - Avoid juice and 2% milk  - Drink 48-64 ounces of fluid per day    Relating to dietary supplements:  - Start a multivitamin containing iron daily    Relating to activity:  - Increase activity as able    Relating to cravings:  - Practice alternatives to emotion eating    Please call Eusebia (ph: [phone] or send a [employer] message) within the month to update your Tasklist and prepare for the next steps.     Call Maciel at [phone] to discuss weight loss surgery " date, and related appointments.      Follow up with RD in one month    Gayathrise Juan Francisco MS, RD, LD  If you need to schedule or reschedule with a dietitian please call [phone].    Health Coaching-3 Pack:      $99 for three health coaching visits    Visits may be done in person or via phone    Coaching is a partnership between the  and the client; Coaches do not prescribe or diagnose    Coaching helps inspire the client to reach his/her personal goals

## 2020-01-09 NOTE — LETTER
"1/9/2020       RE: Bill Wisdom  [address]  [facility] [address]     Dear Colleague,    Thank you for referring your patient, Bill Wisdom, to the [facility]NA[facility]EMENT at [facility]. Please see a copy of my visit note below.    Bariatric Nutrition Consultation Note    Reason For Visit: Nutrition Assessment    Bill Wisdom is a 49 year old presenting today for bariatric nutrition consult.   Pt is interested in laparoscopic sleeve gastrectomy with Dr. Billy expected surgery in April 2020. Pt would like to wait until April so he can save up some more PTO days for work for post-op. Still needs pre-op teaching.  Patient is accompanied by self.  This is pt's 4th of 3 required nutrition visits prior to surgery.     Pt referred by MEGA Guerra on October 2, 2019.  Patient with Co-morbidities of obesity including:   Dyslipidemia yes  Joint pain yes  Back pain yes  GERD yes     Support System Reviewed With Patient 10/2/2019   Who do you have in your support network that can be available to help you for the first 2 weeks after surgery? girlfriend   Who can you count on for support throughout your weight loss surgery journey? girlfriend   Reason for wanting weight loss surgery: \"Wants to be able to have energy, and exercise/move without pain\". He also wants to be around for his grandchildren.    ANTHROPOMETRICS:  Estimated body mass index is 44.16 kg/m  as calculated from the following:    Height as of 11/20/19: 1.727 m (5' 8\").    Weight as of this encounter: 131.7 kg (290 lb 6.4 oz). - boots on (+4.2 lbs over past month)    Required weight loss goal pre-op: 10 lbs from initial consult weight (goal weight 282 lbs or less before surgery)       10/2/2019   I have tried the following methods to lose weight Exercise, Atkins type diet (low carb/high protein), Physician directed program       Weight Loss Questions Reviewed With Patient 10/2/2019   How long " have you been overweight? Since late 20's to early 40's       SUPPLEMENT INFORMATION:  MVI daily, vitamin D3 5000 international unit(s) daily  Milk thistle is listed in his med list but he is no longer taking per his report    NUTRITION HISTORY:  Recall Diet Questions Reviewed With Patient 12/4/2019   Describe what you typically consume for breakfast (typical or most recent): Turkey hilliard 3 pieces + 1 ww toast   Describe what you typically consume for lunch (typical or most recent): Granola bar   Describe what you typically consume for supper (typical or most recent): Fried chicken from [facility] deli or chicken pot pie   Describe what you typically consume as snacks (typical or most recent): Few lately   How many ounces of water, or other low calorie drinks, do you drink daily (8 oz=1 glass)? 24 oz - water   How many ounces of caffeine (coffee, tea, pop) do you drink daily (8 oz=1 glass)? 8 oz - does not drink coffee   How many ounces of carbonated (pop, beer, sparkling water) drinks do you drinky daily (8 oz=1 glass)? 8 oz   How many ounces of juice, pop, sweet tea, sports drinks, protein drinks, other sweetened drinks, do you drink daily (8 oz=1 glass)? 8 oz    How many ounces of milk do you drink daily (8 oz=1 glass) 8 oz - 16oz   Please indicate the type of milk: 2% or skim   How often do you drink alcohol? Never   Pt notes he is trying to cut back on snacking and emotional eating, but is struggling at times.  Wakes up in the middle of the night and snacks.    Gout flare recently (1/9/20)  Choosing some high fat high sodium foods for meals still (fried chicken from deli at [facility] recently)    Labs:  10/2/19: 8 (L)     Eating Habits 10/2/2019   Do you have any dietary restrictions? No   Do you currently binge eat (eat a large amount of food in a short time)? Yes   Are you an emotional eater? Yes   Do you get up to eat after falling asleep? Yes   What foods do you crave? none     PROGRESS WITH PREVIOUS GOALS:  Relating To  "Eating:  - Eat slowly (20-30 minutes per meal), chewing foods well (25 chews per bite/applesauce consistency)  - Continues/improving  - Eat 3 meals per day. Use 9\" Plate method (1/2 plate non-starchy vegetables/fruit, 1/4 plate lean protein, 1/4 plate whole grain starch - no more than 1/2 cup carb/meal)  - Not meeting  - Avoid snacking  - Continues/improving   - reduce cookies/snacks at work to 1 day/week    Relating to beverages:  - Separate fluids from meals by 30 minutes before, during, and after eating  - Continues  - Avoid calorie-containing beverages - Avoid juice and 2% milk  - Continues/improving  - Drink 48-64 ounces of fluid per day  - Continues    Relating to dietary supplements:  - Start a multivitamin containing iron daily  - Meeting    Relating to activity:  - Increase activity as able  - Continues - walking at work    Relating to cravings:  - Practice alternatives to emotion eating  - Continues    ADDITIONAL INFORMATION:  - 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 Since then pt has gained 50 lbs. Pt finds it difficult to go to grocery store and avoid fried foods in deli. Pt recognizes that he has developed emotional eating habits. Eating to find pleasure, and not necessarily hungry.   - Can receive \"Mom's meals,\" home-delivered meals.   - Works for [employer] - snacks abundant at work.   - Was trying to drink green smoothie with PB in the morning -  But felt like it wasn't enough.   - Not currently using his CPAP (does not have it anymore - family member has it?).   - Recently got a job as  at the [employer] a couple weeks ago.    Dining Out History Reviewed With Patient 10/2/2019   How often do you dine out? Nearly every day.   Where do you dine out? (select all that apply) fast food chains- [employer], [employer], chinese buffet   What types of food do you order when you dine out? chicken       Physical Activity Reviewed With Patient 10/2/2019   How often " do you exercise? Less than 1 time per week   What is the duration of your exercise (in minutes)? 10 Minutes    What types of exercise do you do? walking, gym membership   What keeps you from being more active?  I am as active as I can possbily be, Pain, Shortness of breath, Lack of Time, Too tired     MALNUTRITION - no changes  % Intake: No decreased intake noted  % Weight Loss: None noted  Subcutaneous Fat Loss: None observed  Muscle Loss: None observed  Fluid Accumulation/Edema: None noted  Malnutrition Diagnosis: Patient does not meet two of the above criteria necessary for diagnosing malnutrition    NUTRITION DIAGNOSIS:  Obesity r/t long history of self-monitoring deficit and excessive energy intake aeb BMI >30 kg/m2. - Continues    INTERVENTION:  Reviewed post-op diet guidelines, vitamins/minerals essential post-operatively, GI anatomy of bariatric surgeries, ways to help prepare for post-op diet guidelines pre-operatively, portion/calorie-control, mindful eating and sources of protein. Reviewed fluid needs and beverage intake after surgery. Reviewed information on protein shake/bar meal replacements and frozen meal replacements. Encouraged him to try replacing granola bar at lunch with a protein shake. Also showed him pictures of the protein shakes and frozen meal replacements. Encouraged him to replace pot pie frozen dinner with a Healthy Choice or Lean Cuisine + adding extra non-starchy vegetables (frozen, microwavable). Patient discussed he'd like to wait until April 2020 for surgery so he can save up more PTO days at work. RD agreed this would be a good idea so he can also continue to work towards weight loss goal and goals for after surgery. Pt requested prescriptions for multivitamin and vitamin D3 be refilled. RD ordered vitamin D3 3,000 international unit(s). Discussed vitamin D lab with team and ordered follow up lab for next time patient is in clinic. Did not order multivitamin given pt has more  "refills.  Provided encouragement and support. Provided pt with list of goals RD contact information.      Questions Reviewed With Patient 10/2/2019   How ready are you to make changes regarding your weight? Number 1 = Not ready at all to make changes up to 10 = very ready. 10   How confident are you that you can change? 1 = Not confident that you will be successful making changes up to 10 = very confident. 10       Patient Understanding: good  Expected Compliance: good    GOALS:  Relating To Eating:  - Eat slowly (20-30 minutes per meal), chewing foods well (25 chews per bite/applesauce consistency)  - Eat 3 meals per day. Use 9\" Plate method (1/2 plate non-starchy vegetables/fruit, 1/4 plate lean protein, 1/4 plate whole grain starch - no more than 1/2 cup carb/meal)  - Avoid snacking   - reduce cookies/snacks at work to 1 day/week    *Protein Shake Criteria: no more than 210 Calories, at least 20 grams of protein, and less than 10 grams of sugar   *Buy a shaker/ bottle for protein shakes    Meal Replacement Shake Options:   [facility] smoothie (160 Calories, 20 g protein)   Premier Protein (160 Calories, 30 g protein)  Slim Fast Advanced Nutrition (180 Calories, 20 g protein)  Muscle Milk, lactose-free, 17 oz bottle (210 Calories, 30 g protein)  Integrated Supplements, no artificial sugars (110 Calories, 20 g protein)  Elevation Protein Powder at [employer]    Meal Replacement Bar Options:  [facility] Protein Shake (160 Calories, 15 g protein)  Quest Protein Bars (190 Calories, 20 g protein)  Built Bar (170 Calories, 15-20 g protein)  One Protein Bar (210 calories, 20 g protein)  [place] Signature Protein Bar ([employer]) (190 Calories, 21 g protein)  Pure Protein Bars (180 Calories, 21 g protein)    Frozen Meal Replacements  Healthy Choice  Lean Cuisine  Atkins Meals  Smart Ones    Relating to beverages:  - Separate fluids from meals by 30 minutes before, during, and after eating  - Avoid calorie-containing " beverages - Avoid juice and 2% milk  - Drink 48-64 ounces of fluid per day    Relating to dietary supplements:  - Start a multivitamin containing iron daily    Relating to activity:  - Increase activity as able    Relating to cravings:  - Practice alternatives to emotion eating    Please call Eusebia (ph: [phone] or send a [employer] message) within the month to update your Tasklist and prepare for the next steps.     Call Maciel at [phone] to discuss weight loss surgery date, and related appointments.    Time spent with patient: 30 minutes.    Gayathri Chicas MS, RD, LD

## 2020-01-09 NOTE — TELEPHONE ENCOUNTER
[facility] Call Center    Phone Message    May a detailed message be left on voicemail: yes    Reason for Call: Medication Refill Request    Has the patient contacted the pharmacy for the refill? Yes   Name of medication being requested: ibuprofen (ADVIL/MOTRIN) 600 MG tablet  Provider who prescribed the medication:   Pharmacy:[place], [place] - [address] [place] [address] [phone]   Date medication is needed: asap he is out please call when this is submitted          Action Taken: Message routed to:  [facility] ([facility] pain

## 2020-01-09 NOTE — PROGRESS NOTES
Patient informed Gayathrise Juan Francisco RD that he would like to move his OR date to April.  Called patient to review plan.    Plan:  To change OR date from 2/25/2020 to 4/28/2020.  To see Dr Billy on 3/5/2020  To be on CPAP and get sleep medicine clearance before he has his PAC visit.  To check vitamin D and PTH at his next visit.

## 2020-01-10 NOTE — TELEPHONE ENCOUNTER
I called and M for the pt informing him that we sent in a 1 month refill for his Ibuprofen but the pt will need to follow up in the next month before he needs another refill.    I informed the pt that Dr. Laboy doesn't have any openings right now within this time frame so he can schedule with Estela Barton who works with our clinic as well.    You can call us back at [phone] to schedule or if you have any other questions or concerns.    Cira Martinez, CMA

## 2020-01-16 ENCOUNTER — DOCUMENTATION ONLY (OUTPATIENT)
Dept: SLEEP MEDICINE | Facility: CLINIC | Age: 50
End: 2020-01-16

## 2020-01-16 ENCOUNTER — MEDICAL CORRESPONDENCE (OUTPATIENT)
Dept: HEALTH INFORMATION MANAGEMENT | Facility: CLINIC | Age: 50
End: 2020-01-16

## 2020-01-16 NOTE — PROGRESS NOTES
NANCY CALL ME TODAY AND STATED HE HAS [employer] [place] AS A SECONDARY INS .  ID # [id number]  GR# [id number]

## 2020-01-22 ENCOUNTER — OFFICE VISIT (OUTPATIENT)
Dept: SLEEP MEDICINE | Facility: CLINIC | Age: 50
End: 2020-01-22
Payer: COMMERCIAL

## 2020-01-22 VITALS
RESPIRATION RATE: 18 BRPM | DIASTOLIC BLOOD PRESSURE: 75 MMHG | WEIGHT: 289 LBS | OXYGEN SATURATION: 95 % | BODY MASS INDEX: 43.8 KG/M2 | HEART RATE: 66 BPM | HEIGHT: 68 IN | SYSTOLIC BLOOD PRESSURE: 123 MMHG

## 2020-01-22 DIAGNOSIS — E66.01 MORBID OBESITY (H): ICD-10-CM

## 2020-01-22 DIAGNOSIS — G47.33 OSA (OBSTRUCTIVE SLEEP APNEA): Primary | ICD-10-CM

## 2020-01-22 PROCEDURE — 99214 OFFICE O/P EST MOD 30 MIN: CPT | Performed by: INTERNAL MEDICINE

## 2020-01-22 ASSESSMENT — MIFFLIN-ST. JEOR: SCORE: 2140.4

## 2020-01-22 NOTE — PROGRESS NOTES
Chief complaint: Follow-up sleep study    History of Present Illness: 49-year-old gentleman with history of obesity and obstructive sleep apnea.  He lost his CPAP machine while transitioning living situations.  He was moving, temporarily living in a homeless shelter for a while, lost his machine with other belongings.  He needed a repeat sleep study in order to qualify for new machine.  Those results were reviewed with him detail today.  Diagnostic testing confirmed significant sleep apnea.  The in lab test showed optimal CPAP titration    Because the patient is supposed to be undergoing bariatric surgery I had put in an urgent orders for CPAP set up back in December.  Patient reports that he is still not set up.  It appears that because he was provided a machine in 2018 another one will not be covered despite him having lost it.  I sent another message to [facility].  Apparently they are looking into secondary insurance coverage.  Patient also reports that during the first sleep study participated in a voluntary research study that allowed the researcher to put on some extra monitors.  He was offered some kind of compensation in the form of a gift card.  He has not yet received this and is wondering about whether he can get it.     He denies any new sleep concerns.  His appointment with the bariatric surgeon and bariatric surgery has been delayed.    Pilot Sleepiness Scale: 13/24 (Less than 10 normal)    MG Total Score: 15 (normal 0-7, mild 8-14, moderate 15-21, severe 22-28)    Past Medical History:   Diagnosis Date     COPD (chronic obstructive pulmonary disease) (H)      Gastro-oesophageal reflux disease      History of tobacco abuse      Obese      CHANDLER (obstructive sleep apnea)      Prediabetes        No Known Allergies    Current Outpatient Medications   Medication     acetaminophen (TYLENOL) 500 MG tablet     albuterol (ACCUNEB) 1.25 MG/3ML neb solution     albuterol (PROVENTIL HFA) 108 (90  Base) MCG/ACT inhaler     allopurinol (ZYLOPRIM) 100 MG tablet     cholecalciferol (VITAMIN D3) 5000 units TABS tablet     Cholecalciferol (VITAMIN D3) 75 MCG (3000 UT) TABS     ibuprofen (ADVIL/MOTRIN) 600 MG tablet     Multiple Vitamins-Minerals (MULTIVITAMIN ADULT) CHEW     naproxen (NAPROSYN) 500 MG tablet     order for DME     order for DME     Semaglutide,0.25 or 0.5MG/DOS, (OZEMPIC, 0.25 OR 0.5 MG/DOSE,) 2 MG/1.5ML SOPN     tiZANidine (ZANAFLEX) 4 MG capsule     topiramate (TOPAMAX) 100 MG tablet     traZODone (DESYREL) 100 MG tablet     No current facility-administered medications for this visit.        Social History     Socioeconomic History     Marital status: Single     Spouse name: Not on file     Number of children: Not on file     Years of education: Not on file     Highest education level: Not on file   Occupational History     Not on file   Social Needs     Financial resource strain: Not on file     Food insecurity:     Worry: Not on file     Inability: Not on file     Transportation needs:     Medical: Not on file     Non-medical: Not on file   Tobacco Use     Smoking status: Former Smoker     Packs/day: 0.00     Types: Cigarettes     Last attempt to quit: 2019     Years since quittin.0     Smokeless tobacco: Never Used   Substance and Sexual Activity     Alcohol use: No     Comment: quit drinking 2019     Drug use: Not Currently     Sexual activity: Not Currently     Partners: Female     Comment: Past history of cocaine use   Lifestyle     Physical activity:     Days per week: Not on file     Minutes per session: Not on file     Stress: Not on file   Relationships     Social connections:     Talks on phone: Not on file     Gets together: Not on file     Attends [employer] service: Not on file     Active member of club or organization: Not on file     Attends meetings of clubs or organizations: Not on file     Relationship status: Not on file     Intimate partner violence:     Fear of  "current or ex partner: Not on file     Emotionally abused: Not on file     Physically abused: Not on file     Forced sexual activity: Not on file   Other Topics Concern     Parent/sibling w/ CABG, MI or angioplasty before 65F 55M? No   Social History Narrative     Not on file       Family History   Problem Relation Age of Onset     C.A.D. Mother         triple bypass     Hypertension Father      Cerebrovascular Disease Father      Alcohol/Drug Sister          EXAM: Pleasant, alert, no distress  /75   Pulse 66   Resp 18   Ht 1.727 m (5' 8\")   Wt 131.1 kg (289 lb)   SpO2 95%   BMI 43.94 kg/m    Psychiatric: Mood and affect appear normal    PSG 12/12/2019  Weight 288, BMI 44.2  AHI 32.6, TCM showed 215 minutes greater than 55 mmHg, time spent with oxygen saturation at or below 88% was 14 minutes    Titration PSG 12/18/2019  This was completed with a head of bed elevated 30 degrees with a wedge pillow, CPAP final settings of 10 cm of water were effective at treating sleep disordered breathing    [facility] sleep study 4/19/2017 showed severe obstructive sleep apnea with a CPAP to titrated to 10 with mild oxygen desaturation actual report not available    ASSESSMENT:  49-year-old gentleman with morbid obesity, severe obstructive sleep apnea with hypoxemia.  Upcoming plans to go through bariatric surgery.  Needs to get on Pap therapy as soon as possible and obtain compliance prior to going through surgery.    PLAN:  Contacted DME to trauma try to ensure that he gets set up.  Once he gets set up he will be enrolled in the sleep therapy management program for coaching assistance.  He will then follow-up with me face-to-face approximately 7 weeks later to ensure compliance and adequate benefit.  At that time I will send a message to his bariatric surgery team recommending that from a sleep perspective proceeding with surgery would be recommended.  Contact the research team regarding his " questions.    Twenty-five minutes spent with patient, >50% spent counseling and coordinating care.      Elizabeth Bustillos M.D.  Pulmonary/Critical Care/Sleep Medicine    [facility]   Floor 1, [address]   [address] [place] [address]   Appointments: [phone]    The above note was dictated using voice recognition software and may include typographical errors. Please contact the author for any clarifications.

## 2020-01-22 NOTE — PATIENT INSTRUCTIONS
Consider getting a wedge pillow, you will get called to set up the replacement CPAP machine appointment once its approved.  Your BMI is Body mass index is 43.94 kg/m .  Weight management is a personal decision.  If you are interested in exploring weight loss strategies, the following discussion covers the approaches that may be successful. Body mass index (BMI) is one way to tell whether you are at a healthy weight, overweight, or obese. It measures your weight in relation to your height.  A BMI of 18.5 to 24.9 is in the healthy range. A person with a BMI of 25 to 29.9 is considered overweight, and someone with a BMI of 30 or greater is considered obese. More than two-thirds of American adults are considered overweight or obese.  Being overweight or obese increases the risk for further weight gain. Excess weight may lead to heart disease and diabetes.  Creating and following plans for healthy eating and physical activity may help you improve your health.  Weight control is part of healthy lifestyle and includes exercise, emotional health, and healthy eating habits. Careful eating habits lifelong are the mainstay of weight control. Though there are significant health benefits from weight loss, long-term weight loss with diet alone may be very difficult to achieve- studies show long-term success with dietary management in less than 10% of people. Attaining a healthy weight may be especially difficult to achieve in those with severe obesity. In some cases, medications, devices and surgical management might be considered.  What can you do?  If you are overweight or obese and are interested in methods for weight loss, you should discuss this with your provider.     Consider reducing daily calorie intake by 500 calories.     Keep a food journal.     Avoiding skipping meals, consider cutting portions instead.    Diet combined with exercise helps maintain muscle while optimizing fat loss. Strength training is particularly  important for building and maintaining muscle mass. Exercise helps reduce stress, increase energy, and improves fitness. Increasing exercise without diet control, however, may not burn enough calories to loose weight.       Start walking three days a week 10-20 minutes at a time    Work towards walking thirty minutes five days a week     Eventually, increase the speed of your walking for 1-2 minutes at time    In addition, we recommend that you review healthy lifestyles and methods for weight loss available through the National Institutes of Health patient information sites:  http://win.niddk.nih.gov/publications/index.htm    And look into health and wellness programs that may be available through your health insurance provider, employer, local community center, or kenia club.    Weight management plan: Patient was referred to their PCP to discuss a diet and exercise plan.

## 2020-03-02 ENCOUNTER — APPOINTMENT (OUTPATIENT)
Dept: CT IMAGING | Facility: CLINIC | Age: 50
End: 2020-03-02
Attending: EMERGENCY MEDICINE
Payer: OTHER MISCELLANEOUS

## 2020-03-02 ENCOUNTER — HOSPITAL ENCOUNTER (EMERGENCY)
Facility: CLINIC | Age: 50
Discharge: HOME | End: 2020-03-02
Attending: EMERGENCY MEDICINE | Admitting: EMERGENCY MEDICINE
Payer: OTHER MISCELLANEOUS

## 2020-03-02 VITALS
RESPIRATION RATE: 10 BRPM | WEIGHT: 304.2 LBS | HEART RATE: 56 BPM | TEMPERATURE: 98.2 F | SYSTOLIC BLOOD PRESSURE: 145 MMHG | OXYGEN SATURATION: 98 % | DIASTOLIC BLOOD PRESSURE: 80 MMHG | BODY MASS INDEX: 45.05 KG/M2 | HEIGHT: 69 IN

## 2020-03-02 DIAGNOSIS — M54.2 NECK PAIN: ICD-10-CM

## 2020-03-02 LAB — INTERPRETATION ECG - MUSE: NORMAL

## 2020-03-02 PROCEDURE — 99284 EMERGENCY DEPT VISIT MOD MDM: CPT | Mod: 25 | Performed by: EMERGENCY MEDICINE

## 2020-03-02 PROCEDURE — 72125 CT NECK SPINE W/O DYE: CPT

## 2020-03-02 PROCEDURE — 25000132 ZZH RX MED GY IP 250 OP 250 PS 637: Performed by: EMERGENCY MEDICINE

## 2020-03-02 PROCEDURE — 93005 ELECTROCARDIOGRAM TRACING: CPT | Performed by: EMERGENCY MEDICINE

## 2020-03-02 PROCEDURE — 93010 ELECTROCARDIOGRAM REPORT: CPT | Mod: Z6 | Performed by: EMERGENCY MEDICINE

## 2020-03-02 RX ORDER — CYCLOBENZAPRINE HCL 10 MG
10 TABLET ORAL 3 TIMES DAILY PRN
Qty: 20 TABLET | Refills: 0 | Status: SHIPPED | OUTPATIENT
Start: 2020-03-02 | End: 2020-03-04

## 2020-03-02 RX ORDER — OXYCODONE HYDROCHLORIDE 5 MG/1
10 TABLET ORAL ONCE
Status: COMPLETED | OUTPATIENT
Start: 2020-03-02 | End: 2020-03-02

## 2020-03-02 RX ORDER — OXYCODONE HYDROCHLORIDE 5 MG/1
5 TABLET ORAL EVERY 6 HOURS PRN
Qty: 12 TABLET | Refills: 0 | Status: SHIPPED | OUTPATIENT
Start: 2020-03-02 | End: 2023-01-19 | Stop reason: ALTCHOICE

## 2020-03-02 RX ORDER — HYDROCODONE BITARTRATE AND ACETAMINOPHEN 5; 325 MG/1; MG/1
2 TABLET ORAL ONCE
Status: COMPLETED | OUTPATIENT
Start: 2020-03-02 | End: 2020-03-02

## 2020-03-02 RX ORDER — NAPROXEN 500 MG/1
500 TABLET ORAL 2 TIMES DAILY WITH MEALS
Qty: 24 TABLET | Refills: 0 | Status: SHIPPED | OUTPATIENT
Start: 2020-03-02 | End: 2020-03-04

## 2020-03-02 RX ORDER — CYCLOBENZAPRINE HCL 5 MG
10 TABLET ORAL ONCE
Status: COMPLETED | OUTPATIENT
Start: 2020-03-02 | End: 2020-03-02

## 2020-03-02 RX ADMIN — CYCLOBENZAPRINE HYDROCHLORIDE 10 MG: 5 TABLET, FILM COATED ORAL at 17:11

## 2020-03-02 RX ADMIN — HYDROCODONE BITARTRATE AND ACETAMINOPHEN 1 TABLET: 5; 325 TABLET ORAL at 17:12

## 2020-03-02 RX ADMIN — OXYCODONE HYDROCHLORIDE 10 MG: 5 TABLET ORAL at 19:27

## 2020-03-02 ASSESSMENT — ENCOUNTER SYMPTOMS
NECK PAIN: 1
HEADACHES: 0
FEVER: 0
NUMBNESS: 0
WEAKNESS: 0
COUGH: 0
VOMITING: 0
SHORTNESS OF BREATH: 1
NAUSEA: 1
DYSURIA: 0
ABDOMINAL PAIN: 0
CHILLS: 0

## 2020-03-02 ASSESSMENT — MIFFLIN-ST. JEOR: SCORE: 2225.22

## 2020-03-02 NOTE — LETTER
March 2, 2020      To Whom It May Concern:      Bill Wisdom was seen in our Emergency Department today, 03/02/20.  I expect his condition to improve over the next 4 days.  He may return to work/school when improved and should be excused for 3/2/2020 through 3/6/2020.    Sincerely,        Chica Jorge RN

## 2020-03-02 NOTE — ED PROVIDER NOTES
[place] EMERGENCY DEPARTMENT ([facility])  3/02/20    History     Chief Complaint   Patient presents with     Shoulder Pain     bilateral     The history is provided by the patient and medical records.     Bill Wisdom is a 49 year old male with a past medical history significant for morbid obesity, CHANDLER, COPD, tobacco abuse, GERD, chronic back pain, and prediabetes who presents here to the Emergency Department via EMS due to bilateral shoulder and neck pain. Patient reports that he is a  and was dusting a vent earlier today around 3 PM. Reports that he had one arm up while dusting the vent and suddenly began having pain to bilateral shoulders and his neck. Reports that the pain is sharp, constant and radiates to his lower back. States that the pain is 8/10 in severity. Reports that his pain is exacerbated with movement. Patient is right handed. He is also complaining of shortness of breath and nausea.  Patient thinks that the pain is taking his breath away causing the shortness of breath.  Denies chest pain, vomiting, urinary incontinence, fevers, chills, blurred vision, headache, or numbness/tingling to his extremities. Patient reports that he took 2 ibuprofen and he was given 1 nitroglycerin, 2 mg IV dilaudid and 325 mg of aspirin in route by EMS. Does note a previous injection to his C4-C5 in the past.    I have reviewed the Medications, Allergies, Past Medical and Surgical History, and Social History in the [facility] system.    Past Medical History:   Diagnosis Date     COPD (chronic obstructive pulmonary disease) (H)      Gastro-oesophageal reflux disease      History of tobacco abuse      Obese      CHANDLER (obstructive sleep apnea)      Prediabetes        Past Surgical History:   Procedure Laterality Date     HYDROCELECTOMY SCROTAL  2006     INJECT SACROILIAC JOINT Right 9/16/2019    Procedure: Right Sacroiliac Joint Injection;  Surgeon: Sina Laboy MD;  Location:  OR      SPERMATOCELECTOMY  3/20/2012    Procedure:SPERMATOCELECTOMY; Left Spermatocelectomy; Surgeon:DELLA CROW Location:[facility] OR       Family History   Problem Relation Age of Onset     C.A.D. Mother         triple bypass     Hypertension Father      Cerebrovascular Disease Father      Alcohol/Drug Sister        Social History     Tobacco Use     Smoking status: Former Smoker     Packs/day: 0.00     Types: Cigarettes     Last attempt to quit: 2019     Years since quittin.1     Smokeless tobacco: Never Used   Substance Use Topics     Alcohol use: No     Comment: quit drinking 2019       No current facility-administered medications for this encounter.      Current Outpatient Medications   Medication     cyclobenzaprine (FLEXERIL) 10 MG tablet     naproxen (NAPROSYN) 500 MG tablet     oxyCODONE (ROXICODONE) 5 MG tablet     acetaminophen (TYLENOL) 500 MG tablet     albuterol (ACCUNEB) 1.25 MG/3ML neb solution     albuterol (PROVENTIL HFA) 108 (90 Base) MCG/ACT inhaler     allopurinol (ZYLOPRIM) 100 MG tablet     cholecalciferol (VITAMIN D3) 5000 units TABS tablet     Cholecalciferol (VITAMIN D3) 75 MCG (3000 UT) TABS     ibuprofen (ADVIL/MOTRIN) 600 MG tablet     Multiple Vitamins-Minerals (MULTIVITAMIN ADULT) CHEW     order for DME     order for DME     Semaglutide,0.25 or 0.5MG/DOS, (OZEMPIC, 0.25 OR 0.5 MG/DOSE,) 2 MG/1.5ML SOPN     tiZANidine (ZANAFLEX) 4 MG capsule     topiramate (TOPAMAX) 100 MG tablet     traZODone (DESYREL) 100 MG tablet      No Known Allergies    Review of Systems   Constitutional: Negative for chills and fever.   Eyes: Negative for visual disturbance.   Respiratory: Positive for shortness of breath. Negative for cough.    Cardiovascular: Negative for chest pain.   Gastrointestinal: Positive for nausea. Negative for abdominal pain and vomiting.   Genitourinary: Negative for dysuria.        Negative for urinary incontinence   Musculoskeletal: Positive for neck pain.        Positive for  "bilateral shoulder pain   Skin: Negative for rash.   Neurological: Negative for weakness, numbness and headaches.   All other systems reviewed and are negative.      Physical Exam   BP: (!) 147/85  Pulse: 57  Heart Rate: 61  Temp: 98.2  F (36.8  C)  Resp: 18  Height: 175.3 cm (5' 9\")  Weight: 138 kg (304 lb 3.2 oz)  SpO2: 94 %      Physical Exam  GEN:  Well developed, appears somewhat uncomfortable with pain.   HEENT:  EOMI, Mucous membranes are moist.   Cardio:  RRR, no murmur, radial pulses strong and equal bilaterally  PULM:  Lungs clear, good air movement, no wheezes, rales  Abd:  Soft, normal bowel sounds, no focal tenderness  Musculoskeletal: There is mild tenderness in the lower C-spine, no T-spine tenderness, there is diffuse L-spine tenderness, which the patient reports is not new.  Extremities have normal range of motion, however, he has increased pain in the shoulders with raising each arm.  No lower extremity swelling or calf tenderness  Neuro:  Alert and oriented X3, normal strength and sensation throughout the upper extremities bilaterally.  Skin:  Warm, dry    ED Course        Procedures             EKG Interpretation:      Interpreted by Nehal Zhao MD  Time reviewed: 15:12  Symptoms at time of EKG: neck pain, diaphoresis   Rhythm: normal sinus   Rate: normal  Axis: normal  Ectopy: none  Conduction: normal  ST Segments/ T Waves: No ST-T wave changes  Q Waves: none  Comparison to prior: Unchanged from 11/11/19    Clinical Impression: Sinus rhythm, unchanged EKG.      Patient was given p.o. Norco and Flexeril in the emergency department for his pain.  Patient took only 1 tablet of Norco and did not get pain relief, so he was then given 10 mg of p.o. oxycodone.   CT C-spine was done and results are shown here:  Results for orders placed or performed during the hospital encounter of 03/02/20   CT Cervical Spine w/o Contrast    Narrative    1. No acute fracture of the cervical spine.  2. " Multilevel cervical spondylosis, most pronounced at C5-6 with  moderate bilateral neural foraminal stenosis. No significant spinal  canal stenosis present.              Labs Ordered and Resulted from Time of ED Arrival Up to the Time of Departure from the ED - No data to display         Assessments & Plan (with Medical Decision Making)   Patient presents with posterior neck pain radiating to both shoulders after reaching above his head at work earlier today.  He has no neurologic deficits, no sign of cord compression.  No sign of acute infection, no fever.  Patient did initially have some diaphoresis, but never had any chest pain.  He did report shortness of breath, but he thinks that this is pain related.  EKG is unremarkable, patient has equal pulses in both upper extremities, I doubt acute aortic dissection.  There is no sign of acute coronary syndrome.  Patient was given prescriptions for medications as shown below.  The prescription for oxycodone is with 12 tablets and no refills.  Patient was advised to follow-up with neurosurgery for continued care regarding his neck pain.  He has also been seen at a pain clinic in the past and has had previous injections to his back and neck, so he was advised to follow-up with his pain specialist as well.    I have reviewed the nursing notes.    I have reviewed the findings, diagnosis, plan and need for follow up with the patient.    New Prescriptions    CYCLOBENZAPRINE (FLEXERIL) 10 MG TABLET    Take 1 tablet (10 mg) by mouth 3 times daily as needed for muscle spasms    NAPROXEN (NAPROSYN) 500 MG TABLET    Take 1 tablet (500 mg) by mouth 2 times daily (with meals)    OXYCODONE (ROXICODONE) 5 MG TABLET    Take 1 tablet (5 mg) by mouth every 6 hours as needed for pain       Final diagnoses:   Neck pain     IAndres, am serving as a trained medical scribe to document services personally performed by Nehal Zhao MD, based on the provider's statements to me.   I,  Nehal Zhao MD, was physically present and have reviewed and verified the accuracy of this note documented by Andres Gonzales.    3/2/2020   [facility], [place], EMERGENCY DEPARTMENT     Nehal Zhao MD  03/02/20 1958

## 2020-03-02 NOTE — ED AVS SNAPSHOT
[facility], [place], Emergency Department  [address] [place] [id number]  Phone:  [phone]                                    Bill Wisdom   MRN: [id number]    Department:  [facility], Emergency Department   Date of Visit:  3/2/2020           After Visit Summary Signature Page    I have received my discharge instructions, and my questions have been answered. I have discussed any challenges I see with this plan with the nurse or doctor.    ..........................................................................................................................................  Patient/Patient Representative Signature      ..........................................................................................................................................  Patient Representative Print Name and Relationship to Patient    ..................................................               ................................................  Date                                   Time    ..........................................................................................................................................  Reviewed by Signature/Title    ...................................................              ..............................................  Date                                               Time          22EPIC Rev 08/18

## 2020-03-02 NOTE — ED TRIAGE NOTES
BIBA with c/o bilateral shoulder pain, worse on right side. Patient reports pain radiates from neck down. Patient was at work, reaching up to dust the vents when pain started. EMS states upon arrival to scene patient diaphoretic, SOB. 1 Nitroglycerin given en route without relief of pain. 2 mg IV dilaudid given and 325 mg of aspirin given en route.

## 2020-03-03 NOTE — DISCHARGE INSTRUCTIONS
Please make an appointment to follow up with your pain specialist, Your Primary Care Provider, and Neurosurgery Clinic (phone: [phone]) as soon as possible for continued care regarding your neck pain.    Return to the emergency department if you have numbness or weakness in your arms or hands, fever, uncontrolled pain, or other concerns.

## 2020-03-04 ENCOUNTER — PRE VISIT (OUTPATIENT)
Dept: ORTHOPEDICS | Facility: CLINIC | Age: 50
End: 2020-03-04

## 2020-03-04 ENCOUNTER — ANCILLARY PROCEDURE (OUTPATIENT)
Dept: MRI IMAGING | Facility: CLINIC | Age: 50
End: 2020-03-04
Attending: NURSE PRACTITIONER

## 2020-03-04 ENCOUNTER — OFFICE VISIT (OUTPATIENT)
Dept: INTERNAL MEDICINE | Facility: CLINIC | Age: 50
End: 2020-03-04

## 2020-03-04 VITALS
BODY MASS INDEX: 43.42 KG/M2 | SYSTOLIC BLOOD PRESSURE: 123 MMHG | OXYGEN SATURATION: 97 % | HEART RATE: 64 BPM | WEIGHT: 294 LBS | DIASTOLIC BLOOD PRESSURE: 87 MMHG

## 2020-03-04 DIAGNOSIS — M54.12 CERVICAL RADICULOPATHY: Primary | ICD-10-CM

## 2020-03-04 DIAGNOSIS — M54.12 CERVICAL RADICULOPATHY: ICD-10-CM

## 2020-03-04 RX ORDER — NAPROXEN 500 MG/1
500 TABLET ORAL 2 TIMES DAILY WITH MEALS
Qty: 24 TABLET | Refills: 0 | Status: SHIPPED | OUTPATIENT
Start: 2020-03-04 | End: 2020-05-26

## 2020-03-04 RX ORDER — DIAZEPAM 5 MG
5 TABLET ORAL ONCE
Qty: 1 TABLET | Refills: 0 | Status: SHIPPED | OUTPATIENT
Start: 2020-03-04 | End: 2020-03-17

## 2020-03-04 RX ORDER — CYCLOBENZAPRINE HCL 10 MG
10 TABLET ORAL 3 TIMES DAILY PRN
Qty: 20 TABLET | Refills: 0 | Status: SHIPPED | OUTPATIENT
Start: 2020-03-04 | End: 2021-07-09

## 2020-03-04 ASSESSMENT — PAIN SCALES - GENERAL: PAINLEVEL: EXTREME PAIN (9)

## 2020-03-04 NOTE — PROGRESS NOTES
"[facility] [place]   Amalia Hernandez, JUNIOR CNP  03/04/2020      Chief Complaint:   Work Comp and Referral     History of Present Illness:   Bill Wisdom is a 49 year old male with a history of morbid obesity, CHANDLER, COPD, tobacco abuse, GERD, chronic back pain, and prediabetes who presents for ED follow-up and subsequent worker's compensation visit.     He presented to the [facility] ED via EMS with bilateral shoulder and neck pain. He works as a  and was dusting a vent earlier that day. Reports that he had one arm up while dusting the vent and suddenly began having sharp, constant pain to bilateral shoulders and his neck, radiating to his lower back. He was also experiencing SOB from the pain. Patient reports that he took 2 ibuprofen and he was given 1 nitroglycerin, 2 mg IV dilaudid and 325 mg of aspirin in route by EMS. EKG was unremarkable, patient has equal pulses in both upper extremities. CT C-spine on 3/2/20 showed no acute fractures, multilevel cervical spondylosis most pronounced at C5-6. He was given a prescription for oxycodone without refills and advised to follow-up with neurosurgery and pain clinic for further management.     Today, he notes he feels like someone is standing on his shoulders and the pain is going down into his arms, back, and legs. He has not noticed any tingling, numbness, or weakness in the hands, but pain radiates into bilateral shoulders and prevents movement, causing some weakness as a result. He does not have a worker's compensation number for his case yet but did report it to his work. He feels like the oxycodone BID is not helping and he can only fall asleep in a certain position. He is also using 2 tablets of cyclobenzaprine with oxycodone BID. The pain feels 10/10 without medication and without the medication it only \"relaxes him\" but does not take away any pain. He does feel he needs something to sedate him during a potential MRI, as his pain is so severe.    "   Review of Systems:   Pertinent items are noted in HPI or as in patient entered ROS below, remainder of complete ROS is negative.     Active Medications:      acetaminophen (TYLENOL) 500 MG tablet, Take 1-2 tablets (500-1,000 mg) by mouth every 6 hours as needed for mild pain or fever, Disp: 90 tablet, Rfl: 1     albuterol (ACCUNEB) 1.25 MG/3ML neb solution, Take 1 vial (1.25 mg) by nebulization every 6 hours as needed for shortness of breath / dyspnea or wheezing, Disp: 60 vial, Rfl: 3     albuterol (PROVENTIL HFA) 108 (90 Base) MCG/ACT inhaler, Inhale 2 puffs into the lungs every 6 hours as needed for shortness of breath / dyspnea or wheezing, Disp: 8.5 g, Rfl: 3     allopurinol (ZYLOPRIM) 100 MG tablet, Take 1 tablet (100 mg) by mouth daily, Disp: 90 tablet, Rfl: 1     cholecalciferol (VITAMIN D3) 5000 units TABS tablet, Take 5,000 Units by mouth daily, Disp: , Rfl:      Cholecalciferol (VITAMIN D3) 75 MCG (3000 UT) TABS, Take 1 tablet by mouth daily, Disp: 30 tablet, Rfl: 11     cyclobenzaprine (FLEXERIL) 10 MG tablet, Take 1 tablet (10 mg) by mouth 3 times daily as needed for muscle spasms, Disp: 20 tablet, Rfl: 0     ibuprofen (ADVIL/MOTRIN) 600 MG tablet, Take 1 tablet (600 mg) by mouth 4 times daily as needed for moderate pain, Disp: 120 tablet, Rfl: 0     Multiple Vitamins-Minerals (MULTIVITAMIN ADULT) CHEW, Take 1 chew tab by mouth daily, Disp: 60 tablet, Rfl: 11     naproxen (NAPROSYN) 500 MG tablet, Take 1 tablet (500 mg) by mouth 2 times daily (with meals), Disp: 24 tablet, Rfl: 0     oxyCODONE (ROXICODONE) 5 MG tablet, Take 1 tablet (5 mg) by mouth every 6 hours as needed for pain, Disp: 12 tablet, Rfl: 0     Semaglutide,0.25 or 0.5MG/DOS, (OZEMPIC, 0.25 OR 0.5 MG/DOSE,) 2 MG/1.5ML SOPN, Inject 0.5 mg Subcutaneous once a week, Disp: 1.5 mL, Rfl: 2     tiZANidine (ZANAFLEX) 4 MG capsule, Take 1 capsule (4 mg) by mouth 2 times daily as needed for muscle spasms, Disp: 30 capsule, Rfl: 1     topiramate  (TOPAMAX) 100 MG tablet, Take 1 tablet (100 mg) by mouth daily (with dinner), Disp: 30 tablet, Rfl: 3     traZODone (DESYREL) 100 MG tablet, Take 100 mg by mouth At Bedtime, Disp: , Rfl:       Allergies:   Patient has no known allergies.      Past Medical History:  COPD   GERD  Obesity  Prediabetes  Chronic bilateral low back pain with right-sided sciatica  Spermatocele   Simple chronic bronchitis  Neck pain      Past Surgical History:  Hydrocelectomy scrotal: 2006  Inject SI joint: 9/2019  Spermatocelectomy: 3/2012     Family History:   Coronary artery disease: mother  Hypertension: father  Cerebrovascular disease: father  Alcohol/drug: sister       Social History:   The patient was alone.   Smoking Status: Former smoker, quit 2019   Smokeless Tobacco: Never used   Alcohol Use: No, stopped in Jan 2019   Drug Use: not currently      Physical Exam:   /87 (BP Location: Right arm, Patient Position: Sitting, Cuff Size: Adult Large)   Pulse 64   Wt 133.4 kg (294 lb)   SpO2 97%   BMI 43.42 kg/m       Constitutional: Alert, oriented, pleasant, no acute distress  Head: Normocephalic, atraumatic  Eyes: Extra-ocular movements intact, pupils equally round and reactive bilaterally, no scleral icterus   ENT: Oropharynx clear, moist mucus membranes, good dentition  Neck: Supple, no lymphadenopathy, no thyromegaly  Cardiovascular: Regular rate and rhythm, no murmurs, rubs or gallops  Respiratory: Good air movement bilaterally, lungs clear, no wheezes/rales/rhonchi  Musculoskeletal: No edema, normal muscle tone, ambulates with single point cane, bilateral trapezius muscle tenderness, pain over cervical spine, reduced ROM of neck secondary to the pain, reduced bilateral shoulder ROM and strength   Neurologic: Alert and oriented, cranial nerves grossly intact, strength 5/5 throughout, sensation to light touch intact, reflexes 2+ bilaterally  Psychiatric: normal mentation, affect and mood      Assessment and Plan:  Cervical  radiculopathy  Recently seen in the ED for neck and shoulder pain radiating to the back and legs after he reached up to dust a vent at his job as a . CT cervical spine revealed multilevel cervical spondylosis, most pronounced at C5-6. Recommended he be seen by neurosurgery for further management while in the ED. Will obtain cervical spine MRI beforehand scheduling with spine specialist. We will have him try a dose of Valium one hour before MRI and otherwise continue with naproxen, cyclobenzaprine, heat, and ice while at home. He will update the clinic with his worker's compensation case number when able.   - MRI Cervical Spine w/o & w contrast  - naproxen (NAPROSYN) 500 MG tablet  Dispense: 24 tablet; Refill: 0  - cyclobenzaprine (FLEXERIL) 10 MG tablet  Dispense: 20 tablet; Refill: 0  - diazepam (VALIUM) 5 MG tablet  Dispense: 1 tablet; Refill: 0  - Orthopedic & Spine  Referral     Follow-up: PRN    Scribe Disclosure:  I, Lexus Jaimes, am serving as a scribe to document services personally performed by JUNIOR Cabello CNP at this visit, based upon the provider's statements to me. All documentation has been reviewed by the aforementioned provider prior to being entered into the official medical record.     Portions of this medical record were completed by a scribe. UPON MY REVIEW AND AUTHENTICATION BY ELECTRONIC SIGNATURE, this confirms (a) I performed the applicable clinical services, and (b) the record is accurate.     JUNIOR Cabello CNP

## 2020-03-04 NOTE — TELEPHONE ENCOUNTER
INTAKE QUESTIONS FOR SPINE AND NECK                                                                     REASON FOR VISIT: Pt cancelled   APPOINTMENT DATE: Pt cancelled   HAVE YOU HAD PREVIOUS SURGERIES ON YOUR NECK OR SPINE: Pt cancelled  WHERE? Pt cancelled    WHEN? Pt cancelled      HAVE YOU HAD RELATED IMAGING?   (BONE SCANS, XRAYS, CAT SCANS, MRIS) pt cancelled    WHERE? Pt cancelled    WHEN? Pt cancelled   HAVE YOU HAD INJECTIONS TO THE SPINE? Pt cancelled    WHERE? Pt cancelled    WHEN? Pt cancelled   HAVE YOU HAD RELATED PHYSICAL THERAPY IN THE LAST YEAR? Pt cancelled    WHERE? Pt cancelled   DO YOU HAVE ANY RELATED IMPLANTS OR HARDWARE? Pt cancelled   DO YOU HAVE ANY PATHOLOGY REPORTS RELATED TO YOUR SPINE OR NECK? Pt cancelled     CSS NOTES STATUS DETAILS   OFFICE NOTE from referring provider N/A    OFFICE NOTE from other specialist N/A    PHYSICAL THERAPY (WITHIN LAST YEAR) N/A    DISCHARGE SUMMARY from hospital N/A    DISCHARGE REPORT from the ER N/A    OPERATIVE REPORT N/A    MEDICATION LIST N/A    LABS/CBC/DIFF N/A    CULTURES N/A    IMPLANT RECORD/STICKER N/A    IMAGING     INJECTIONS DONE IN RADIOLOGY N/A    MRI N/A    CT SCAN N/A    XRAYS (IMAGES & REPORTS) N/A    TUMOR     PATHOLOGY  Slides & report N/A

## 2020-03-04 NOTE — NURSING NOTE
Chief Complaint   Patient presents with     Work Comp     pt here for work comp     Referral     pt here for neurosurgey referral       Viola Beyer CMA, EMT at 10:21 AM on 3/4/2020.

## 2020-03-04 NOTE — PATIENT INSTRUCTIONS
[facility] Medication Refill Request Information:  * Please contact your pharmacy regarding ANY request for medication refills.  ** [facility] Prescription Fax = [phone]  * Please allow 3 business days for routine medication refills.  * Please allow 5 business days for controlled substance medication refills.     [facility] Test Result notification information:  *You will be notified with in 7-10 days of your appointment day regarding the results of your test.  If you are on MyChart you will be notified as soon as the provider has reviewed the results and signed off on them.    [facility]: [phone]

## 2020-03-05 ENCOUNTER — TELEPHONE (OUTPATIENT)
Dept: SURGERY | Facility: CLINIC | Age: 50
End: 2020-03-05

## 2020-03-05 ENCOUNTER — TELEPHONE (OUTPATIENT)
Dept: NEUROSURGERY | Facility: CLINIC | Age: 50
End: 2020-03-05

## 2020-03-05 NOTE — TELEPHONE ENCOUNTER
[facility] Call Center    Phone Message    May a detailed message be left on voicemail: yes     Reason for Call: Other: .  Patient states back pain is level 9/10 and is running out of medication. Patient is requesting an appointment for neurosurgery as soon as possible. Patient has a work related injury and wants to know if [place] and [place] by a different provider. Please advise.    Action Taken: Message routed to:  [facility]    Travel Screening: Not Applicable

## 2020-03-05 NOTE — TELEPHONE ENCOUNTER
Left a message for someone from [facility], Dr. Tena's office to call back to discuss if someone from their clinic had called the patient. Also would like to discuss if the patient is appropriately scheduled. Reena Enrique LPN 3/5/2020 1:12 PM

## 2020-03-05 NOTE — TELEPHONE ENCOUNTER
"Called patient as he no-showed for clinic visit with Dr. Billy. He said \"Oh no was that today?\"  He said he got hurt at work and is in a lot of pain. I asked if he wanted to put bariatric surgery on hold, currently scheduled on 4/28 for sleeve. He does not want to be taken off that date. He asked that I call him back to discuss after his appointment on 3/9 with Ortho when he would find out what is going on. I told him I would call him.   "

## 2020-03-05 NOTE — TELEPHONE ENCOUNTER
[facility] Call Center    Phone Message    May a detailed message be left on voicemail: yes     Reason for Call: Appointment Intake    Referring Provider Name: Amalia Hernandez BOB  Diagnosis and/or Symptoms: Recommended he be seen by neurosurgery for further management. Cervical spine revealed multilevel cervical spondylosis, most pronounced at C5-6 all notes in Epic referencing referral. Please see Office visit 3/4/20 along with Imaging. Patient is requesting appointment as soon as possible. Please call to schedule.    Action Taken: Message routed to:  [facility] Neurosurgery    Travel Screening: Not Applicable

## 2020-03-05 NOTE — TELEPHONE ENCOUNTER
Spoke to patient who states that he spoke to the [facility] who informed the patient that they might not be able to see the patient for this, as his injury is a workman's comp case. Patient states that the person from the [facility] was going to call him back and let him know. Patient is concerned that he may not be able to be seen and he was told that another provider that might be able to see him is booked out. Patient states he is in a lot of pain and is not sure he can wait that long. Patient would like to know what is going on and if he is going to be able to be seen or not. Reena Enrique LPN 3/5/2020 12:35 PM

## 2020-03-05 NOTE — TELEPHONE ENCOUNTER
Spoke to scheduling to get the patient rescheduled for correct department, after we were informed by [facility], Dr. Tena's office that he would need to review the chart and if the patient has has this condition for 6 months or more he can be seen by that provider. This is a newer condition for the patient, so patient was rescheduled with a different provider for this condition.     Called patient to relay situation and relay new appointment time. Patient will call to change his metro mobility appointment time. Reena Enrique LPN 3/5/2020 2:34 PM

## 2020-03-06 ENCOUNTER — DOCUMENTATION ONLY (OUTPATIENT)
Dept: SLEEP MEDICINE | Facility: CLINIC | Age: 50
End: 2020-03-06

## 2020-03-06 NOTE — PROGRESS NOTES
Patient was offered choice of vendor and chose [facility].  Patient Bill Wisdom was set up at [place] on March 6, 2020. Patient received a Resmed AirSense 10 Auto. Pressures were set at 9-11 cm H2O.   Patient s ramp is 0 cm H2O and FLEX/EPR is 2.  Patient received a Resmed Mask name: AIRFIT N20 WITH LARGE HEADGEAR  Nasal mask size Medium, heated tubing and heated humidifier.  Patient does need to meet compliance. Patient has a follow up on 4/22/20 with Dr. Bustillos.    Marizol Padron

## 2020-03-09 ENCOUNTER — OFFICE VISIT (OUTPATIENT)
Dept: NEUROSURGERY | Facility: CLINIC | Age: 50
End: 2020-03-09
Attending: PHYSICIAN ASSISTANT
Payer: COMMERCIAL

## 2020-03-09 VITALS
HEIGHT: 69 IN | OXYGEN SATURATION: 96 % | SYSTOLIC BLOOD PRESSURE: 120 MMHG | HEART RATE: 64 BPM | DIASTOLIC BLOOD PRESSURE: 82 MMHG | BODY MASS INDEX: 44.43 KG/M2 | WEIGHT: 300 LBS | TEMPERATURE: 97.4 F

## 2020-03-09 DIAGNOSIS — M54.16 LUMBAR RADICULOPATHY: Primary | ICD-10-CM

## 2020-03-09 DIAGNOSIS — M54.12 CERVICAL RADICULOPATHY: ICD-10-CM

## 2020-03-09 DIAGNOSIS — M54.2 CERVICALGIA: ICD-10-CM

## 2020-03-09 PROCEDURE — G0463 HOSPITAL OUTPT CLINIC VISIT: HCPCS

## 2020-03-09 PROCEDURE — 99204 OFFICE O/P NEW MOD 45 MIN: CPT | Performed by: PHYSICIAN ASSISTANT

## 2020-03-09 RX ORDER — DIAZEPAM 10 MG
10 TABLET ORAL PRN
Qty: 2 TABLET | Refills: 0 | Status: SHIPPED | OUTPATIENT
Start: 2020-03-09 | End: 2020-03-17

## 2020-03-09 ASSESSMENT — PAIN SCALES - GENERAL: PAINLEVEL: EXTREME PAIN (9)

## 2020-03-09 ASSESSMENT — MIFFLIN-ST. JEOR: SCORE: 2206.17

## 2020-03-09 NOTE — PROGRESS NOTES
Dr. Shmuel Shultz  [facility] Neurosurgery Clinic Visit      CC: Neck and back pain    Primary care Provider: Amalia Hernandez      Reason For Visit:   I was asked by Amalia Hernandez APRN CNP  to consult on the patient for M54.12 (ICD-10-CM) - Cervical radiculopathy.      HPI: Bill Wisdom is a 49 year old male who presents for evaluation of neck pain x 1 week. Pain is located in bilateral neck and radiates to bilateral shoulders and radiates down bilateral back into bilateral posterior legs to the knee. Describes the pain as sharp. Pain is worsened with movement. Pain is alleviated with oxycodone. He has been taking oxycodone, flexeril,  and naproxen. No PT or injections. He did have injection in low back ~2 months ago without relief. Denies bladder/bowel incontinence.    Current pain: 8-9/10    Past Medical History:   Diagnosis Date     COPD (chronic obstructive pulmonary disease) (H)      Gastro-oesophageal reflux disease      History of tobacco abuse      Obese      CHANDLER (obstructive sleep apnea)      Prediabetes        Past Medical History reviewed with patient during visit.    Past Surgical History:   Procedure Laterality Date     HYDROCELECTOMY SCROTAL  2006     INJECT SACROILIAC JOINT Right 9/16/2019    Procedure: Right Sacroiliac Joint Injection;  Surgeon: Sina Laboy MD;  Location: [facility] OR     SPERMATOCELECTOMY  3/20/2012    Procedure:SPERMATOCELECTOMY; Left Spermatocelectomy; Surgeon:DELLA CROW; Location:[facility] OR     Past Surgical History reviewed with patient during visit.    Current Outpatient Medications   Medication     acetaminophen (TYLENOL) 500 MG tablet     albuterol (ACCUNEB) 1.25 MG/3ML neb solution     albuterol (PROVENTIL HFA) 108 (90 Base) MCG/ACT inhaler     allopurinol (ZYLOPRIM) 100 MG tablet     cholecalciferol (VITAMIN D3) 5000 units TABS tablet     Cholecalciferol (VITAMIN D3) 75 MCG (3000 UT) TABS     cyclobenzaprine (FLEXERIL) 10 MG tablet     Multiple  Vitamins-Minerals (MULTIVITAMIN ADULT) CHEW     naproxen (NAPROSYN) 500 MG tablet     order for DME     order for DME     oxyCODONE (ROXICODONE) 5 MG tablet     Semaglutide,0.25 or 0.5MG/DOS, (OZEMPIC, 0.25 OR 0.5 MG/DOSE,) 2 MG/1.5ML SOPN     tiZANidine (ZANAFLEX) 4 MG capsule     topiramate (TOPAMAX) 100 MG tablet     traZODone (DESYREL) 100 MG tablet     ibuprofen (ADVIL/MOTRIN) 600 MG tablet     No current facility-administered medications for this visit.        No Known Allergies    Social History     Socioeconomic History     Marital status: Single     Spouse name: None     Number of children: None     Years of education: None     Highest education level: None   Occupational History     None   Social Needs     Financial resource strain: None     Food insecurity     Worry: None     Inability: None     Transportation needs     Medical: None     Non-medical: None   Tobacco Use     Smoking status: Former Smoker     Packs/day: 0.00     Types: Cigarettes     Last attempt to quit: 2019     Years since quittin.1     Smokeless tobacco: Never Used   Substance and Sexual Activity     Alcohol use: No     Comment: quit drinking 2019     Drug use: Not Currently     Sexual activity: Not Currently     Partners: Female     Comment: Past history of cocaine use   Lifestyle     Physical activity     Days per week: None     Minutes per session: None     Stress: None   Relationships     Social connections     Talks on phone: None     Gets together: None     Attends Mosque service: None     Active member of club or organization: None     Attends meetings of clubs or organizations: None     Relationship status: None     Intimate partner violence     Fear of current or ex partner: None     Emotionally abused: None     Physically abused: None     Forced sexual activity: None   Other Topics Concern     Parent/sibling w/ CABG, MI or angioplasty before 65F 55M? No   Social History Narrative     None       Family History  "  Problem Relation Age of Onset     C.A.D. Mother         triple bypass     Hypertension Father      Cerebrovascular Disease Father      Alcohol/Drug Sister           ROS: 10 point ROS neg other than the symptoms noted above in the HPI.    Vital Signs: /82   Pulse 64   Temp 97.4  F (36.3  C)   Ht 5' 9\" (1.753 m)   Wt 300 lb (136.1 kg)   SpO2 96%   BMI 44.30 kg/m      Examination:  Constitutional:  Alert, morbidly obese, NAD.  HEENT: Normocephalic, atraumatic.   Pulmonary:  Without shortness of breath, normal effort.   Lymph: no lymphadenopathy to low back or LE.   Integumentary: Skin is free of rashes or lesions.   Cardiovascular:  No pitting edema of BLE.      Neurological:  Awake  Alert  Oriented x 3  Speech clear  Cranial nerves II - XII grossly intact  PERRL  EOMI  Face symmetric  Tongue midline  Motor exam   Shoulder Abduction:  Right:  5/5   Left:  5/5  Biceps:                      Right:  5/5   Left:  5/5  Triceps:                     Right:  5/5   Left:  5/5  Wrist Extensors:       Right:  5/5   Left:  5/5  Wrist Flexors:           Right:  5/5   Left:  5/5  Intrinsics:                   Right:  5/5   Left:  5/5  Hip Flexor:                Right: 5/5  Left:  5/5  Quadriceps:              Right:  5/5  Left:  5/5  Hamstrings:              Right:  5/5  Left:  5/5  Gastroc Soleus:        Right:  5/5  Left:  5/5  Tib/Ant:                      Right:  5/5  Left:  5/5  EHL:                          Right:  5/5  Left:  5/5       Sensation normal to bilateral upper and lower extremities.    Reflexes are 2+ in the patellar and Achilles. There is no clonus. Downgoing Babinski.    Reflexes are 2+ in the brachial radialis and triceps. Negative Tushar sign bilaterally.  Musculoskeletal:  Gait: Able to stand from a seated position. Normal non-antalgic, non-myelopathic gait.  Cervical examination reveals good range of motion.  Tenderness throughout CTL spine.      Imaging:   MR CERVICAL SPINE W/O CONTRAST " 3/4/2020 4:10 PM     Provided History: Radiculopathy; Cervical radiculopathy  ICD-10: Cervical radiculopathy     Comparison: CT cervical spine 3/2/2020.     Technique: Sagittal T1-weighted, sagittal T2-weighted, sagittal STIR,  sagittal diffusion weighted, axial T2-weighted, and axial T2* gradient  echo images of the cervical spine were obtained without intravenous  contrast.     Findings:  Multiple images are degraded by artifact related to patient motion.  The cervical vertebrae are normally aligned. Multilevel disc  dehydration. Mild loss of disc height at C5-6.  No abnormal cord  signal.  Mild congenital spinal canal narrowing. The findings on a  level by level basis are as follows:     C2-3:  No spinal canal or neural foraminal stenosis.     C3-4:  Posterior disc bulge. No spinal canal or neural neural  foraminal stenosis.     C4-5:  Bilateral facet hypertrophy. No spinal canal or neural  foraminal stenosis.     C5-6:  Asymmetric right disc osteophyte complex with right greater  than left uncinate hypertrophy. Right facet hypertrophy. Moderate  right and mild left neural foraminal stenosis. Mild spinal canal  narrowing.     C6-7:  Posterior disc bulge. Mild spinal canal narrowing. No neural  foraminal stenosis.     C7-T1:  No spinal canal or neural foraminal stenosis.     No abnormality of the paraspinous soft tissues.     Mild paranasal sinus mucosal thickening.                                                                      Impression:   1. Congenital spinal canal narrowing with superimposed spondylosis,  most pronounced at C5-6.  2. No myelopathic cord signal.     LINDSEY KEVIN MD    Assessment/Plan:   Bill Wisdom is a 49 year old malewho presents for evaluation of neck pain x 1 week. Pain is located in bilateral neck and radiates to bilateral shoulders and radiates down bilateral back into bilateral posterior legs to the knee. Describes the pain as sharp. Cervical MRI reviewed in office.  He has  been taking oxycodone, flexeril,  and naproxen. No PT or injections. He did have injection in low back ~2 months ago without relief. Will order updated lumbar MRI and start him with course of PT for neck and back.  He did request work excuse and I advised he follow up with PCP if he feels he needs to be off of work. I will call him with MRI results. Patient voiced understanding and agreement.          Cinthia Torres PA-C  [facility]  [address] [address]    Tel [phone]  Pager [phone]

## 2020-03-09 NOTE — NURSING NOTE
"Bill Wisdom is a 49 year old male who presents for:  Chief Complaint   Patient presents with     Neurologic Problem     Cervical radiculopathy        Initial Vitals:  /82   Pulse 64   Temp 97.4  F (36.3  C)   Ht 5' 9\" (1.753 m)   Wt 300 lb (136.1 kg)   SpO2 96%   BMI 44.30 kg/m   Estimated body mass index is 44.3 kg/m  as calculated from the following:    Height as of this encounter: 5' 9\" (1.753 m).    Weight as of this encounter: 300 lb (136.1 kg).. Body surface area is 2.57 meters squared. BP completed using cuff size: large  Extreme Pain (9)    Nursing Comments: Patient presents w/ pain level 9 base of neck radiates bilaterally down both arms to lower back tingling goes down both legs    Roderick Colon MA  "

## 2020-03-09 NOTE — LETTER
[facility]  [address]  [address]    March 9, 2020      To Whom it May Concern,          Bill Wisdom was seen in clinic on 3/9/2020.  Please excuse him from work during this time.               Sincerely,        Cinthia Torres PA-C  [facility]  [address] [address]    Tel [phone]

## 2020-03-09 NOTE — LETTER
3/9/2020         RE: Bill Wisdom  [address]  [place] [address]        Dear Colleague,    Thank you for referring your patient, Bill Wisdom, to the [facility]UROS[facility]. Please see a copy of my visit note below.    Dr. Shmuel GRESHAM [facility] Visit      CC: Neck and back pain    Primary care Provider: Amalia Hernandez      Reason For Visit:   I was asked by Amalia Hernandez, APRN CNP  to consult on the patient for M54.12 (ICD-10-CM) - Cervical radiculopathy.      HPI: Bill Wisdom is a 49 year old male who presents for evaluation of neck pain x 1 week. Pain is located in bilateral neck and radiates to bilateral shoulders and radiates down bilateral back into bilateral posterior legs to the knee. Describes the pain as sharp. Pain is worsened with movement. Pain is alleviated with oxycodone. He has been taking oxycodone, flexeril,  and naproxen. No PT or injections. He did have injection in low back ~2 months ago without relief. Denies bladder/bowel incontinence.    Current pain: 8-9/10    Past Medical History:   Diagnosis Date     COPD (chronic obstructive pulmonary disease) (H)      Gastro-oesophageal reflux disease      History of tobacco abuse      Obese      CHANDLER (obstructive sleep apnea)      Prediabetes        Past Medical History reviewed with patient during visit.    Past Surgical History:   Procedure Laterality Date     HYDROCELECTOMY SCROTAL  2006     INJECT SACROILIAC JOINT Right 9/16/2019    Procedure: Right Sacroiliac Joint Injection;  Surgeon: Sina Laboy MD;  Location: [facility] OR     SPERMATOCELECTOMY  3/20/2012    Procedure:SPERMATOCELECTOMY; Left Spermatocelectomy; Surgeon:DELLA CROW; Location:[facility] OR     Past Surgical History reviewed with patient during visit.    Current Outpatient Medications   Medication     acetaminophen (TYLENOL) 500 MG tablet     albuterol (ACCUNEB) 1.25 MG/3ML neb solution     albuterol  (PROVENTIL HFA) 108 (90 Base) MCG/ACT inhaler     allopurinol (ZYLOPRIM) 100 MG tablet     cholecalciferol (VITAMIN D3) 5000 units TABS tablet     Cholecalciferol (VITAMIN D3) 75 MCG (3000 UT) TABS     cyclobenzaprine (FLEXERIL) 10 MG tablet     Multiple Vitamins-Minerals (MULTIVITAMIN ADULT) CHEW     naproxen (NAPROSYN) 500 MG tablet     order for DME     order for DME     oxyCODONE (ROXICODONE) 5 MG tablet     Semaglutide,0.25 or 0.5MG/DOS, (OZEMPIC, 0.25 OR 0.5 MG/DOSE,) 2 MG/1.5ML SOPN     tiZANidine (ZANAFLEX) 4 MG capsule     topiramate (TOPAMAX) 100 MG tablet     traZODone (DESYREL) 100 MG tablet     ibuprofen (ADVIL/MOTRIN) 600 MG tablet     No current facility-administered medications for this visit.        No Known Allergies    Social History     Socioeconomic History     Marital status: Single     Spouse name: None     Number of children: None     Years of education: None     Highest education level: None   Occupational History     None   Social Needs     Financial resource strain: None     Food insecurity     Worry: None     Inability: None     Transportation needs     Medical: None     Non-medical: None   Tobacco Use     Smoking status: Former Smoker     Packs/day: 0.00     Types: Cigarettes     Last attempt to quit: 2019     Years since quittin.1     Smokeless tobacco: Never Used   Substance and Sexual Activity     Alcohol use: No     Comment: quit drinking 2019     Drug use: Not Currently     Sexual activity: Not Currently     Partners: Female     Comment: Past history of cocaine use   Lifestyle     Physical activity     Days per week: None     Minutes per session: None     Stress: None   Relationships     Social connections     Talks on phone: None     Gets together: None     Attends Yazdanism service: None     Active member of club or organization: None     Attends meetings of clubs or organizations: None     Relationship status: None     Intimate partner violence     Fear of current or  "ex partner: None     Emotionally abused: None     Physically abused: None     Forced sexual activity: None   Other Topics Concern     Parent/sibling w/ CABG, MI or angioplasty before 65F 55M? No   Social History Narrative     None       Family History   Problem Relation Age of Onset     C.A.D. Mother         triple bypass     Hypertension Father      Cerebrovascular Disease Father      Alcohol/Drug Sister           ROS: 10 point ROS neg other than the symptoms noted above in the HPI.    Vital Signs: /82   Pulse 64   Temp 97.4  F (36.3  C)   Ht 5' 9\" (1.753 m)   Wt 300 lb (136.1 kg)   SpO2 96%   BMI 44.30 kg/m      Examination:  Constitutional:  Alert, morbidly obese, NAD.  HEENT: Normocephalic, atraumatic.   Pulmonary:  Without shortness of breath, normal effort.   Lymph: no lymphadenopathy to low back or LE.   Integumentary: Skin is free of rashes or lesions.   Cardiovascular:  No pitting edema of BLE.      Neurological:  Awake  Alert  Oriented x 3  Speech clear  Cranial nerves II - XII grossly intact  PERRL  EOMI  Face symmetric  Tongue midline  Motor exam   Shoulder Abduction:  Right:  5/5   Left:  5/5  Biceps:                      Right:  5/5   Left:  5/5  Triceps:                     Right:  5/5   Left:  5/5  Wrist Extensors:       Right:  5/5   Left:  5/5  Wrist Flexors:           Right:  5/5   Left:  5/5  Intrinsics:                   Right:  5/5   Left:  5/5  Hip Flexor:                Right: 5/5  Left:  5/5  Quadriceps:              Right:  5/5  Left:  5/5  Hamstrings:              Right:  5/5  Left:  5/5  Gastroc Soleus:        Right:  5/5  Left:  5/5  Tib/Ant:                      Right:  5/5  Left:  5/5  EHL:                          Right:  5/5  Left:  5/5       Sensation normal to bilateral upper and lower extremities.    Reflexes are 2+ in the patellar and Achilles. There is no clonus. Downgoing Babinski.    Reflexes are 2+ in the brachial radialis and triceps. Negative Tushar sign " bilaterally.  Musculoskeletal:  Gait: Able to stand from a seated position. Normal non-antalgic, non-myelopathic gait.  Cervical examination reveals good range of motion.  Tenderness throughout CTL spine.      Imaging:   MR CERVICAL SPINE W/O CONTRAST 3/4/2020 4:10 PM     Provided History: Radiculopathy; Cervical radiculopathy  ICD-10: Cervical radiculopathy     Comparison: CT cervical spine 3/2/2020.     Technique: Sagittal T1-weighted, sagittal T2-weighted, sagittal STIR,  sagittal diffusion weighted, axial T2-weighted, and axial T2* gradient  echo images of the cervical spine were obtained without intravenous  contrast.     Findings:  Multiple images are degraded by artifact related to patient motion.  The cervical vertebrae are normally aligned. Multilevel disc  dehydration. Mild loss of disc height at C5-6.  No abnormal cord  signal.  Mild congenital spinal canal narrowing. The findings on a  level by level basis are as follows:     C2-3:  No spinal canal or neural foraminal stenosis.     C3-4:  Posterior disc bulge. No spinal canal or neural neural  foraminal stenosis.     C4-5:  Bilateral facet hypertrophy. No spinal canal or neural  foraminal stenosis.     C5-6:  Asymmetric right disc osteophyte complex with right greater  than left uncinate hypertrophy. Right facet hypertrophy. Moderate  right and mild left neural foraminal stenosis. Mild spinal canal  narrowing.     C6-7:  Posterior disc bulge. Mild spinal canal narrowing. No neural  foraminal stenosis.     C7-T1:  No spinal canal or neural foraminal stenosis.     No abnormality of the paraspinous soft tissues.     Mild paranasal sinus mucosal thickening.                                                                      Impression:   1. Congenital spinal canal narrowing with superimposed spondylosis,  most pronounced at C5-6.  2. No myelopathic cord signal.     LINDSEY KEVIN MD    Assessment/Plan:   Bill Wisdom is a 49 year old malewho presents for  evaluation of neck pain x 1 week. Pain is located in bilateral neck and radiates to bilateral shoulders and radiates down bilateral back into bilateral posterior legs to the knee. Describes the pain as sharp. Cervical MRI reviewed in office.  He has been taking oxycodone, flexeril,  and naproxen. No PT or injections. He did have injection in low back ~2 months ago without relief. Will order updated lumbar MRI and start him with course of PT for neck and back.  He did request work excuse and I advised he follow up with PCP if he feels he needs to be off of work. I will call him with MRI results. Patient voiced understanding and agreement.          Cinthia Torres PA-C  [facility] [place] [address]    Tel [phone]  Pager [phone]      Again, thank you for allowing me to participate in the care of your patient.        Sincerely,        Cinthia Torres PA-C

## 2020-03-09 NOTE — PATIENT INSTRUCTIONS
1. Lumbar MRI ordered. Please stop at  or call [phone] to schedule. I will call you with results.     2. Valium for MRI sent to preferred pharmacy    3. Physical therapy for neck and low back ordered. They will contact you to schedule.

## 2020-03-10 ENCOUNTER — DOCUMENTATION ONLY (OUTPATIENT)
Dept: SLEEP MEDICINE | Facility: CLINIC | Age: 50
End: 2020-03-10
Payer: COMMERCIAL

## 2020-03-10 NOTE — PROGRESS NOTES
3 DAY STM VISIT    Diagnostic AHI: 29.7  PSG    Patient contacted for 3 day STM visit.    Confirmed with patient at time of call- Yes Patient is still interested in STM service     Subjective measures: Patient suffering a low  and upper back pain. Patient also suffering neck pain. Patient is trying to use CPAP as much as he can.     Replacement device: Yes    STM ordered by provider: No     Device type: Auto-CPAP  PAP settings from order::  CPAP min 9 cm  H20       CPAP max 11 cm  H20        Mask type:    Nasal Mask     Device settings from machine      Min CPAP 9.0            Max CPAP 11.0      Assessment: Nightly usage, most nights under four hours.   Action plan: Patient to have 14 day STM visit. Patient has a follow up visit scheduled:   yes within 31-90 days of set up.     Total time spent on accessing and  interpreting remote patient PAP therapy data  10 minutes  Total time spent counseling, coaching  and reviewing PAP therapy data with patient  9 minutes  [id number] no

## 2020-03-16 ENCOUNTER — ANCILLARY PROCEDURE (OUTPATIENT)
Dept: MRI IMAGING | Facility: CLINIC | Age: 50
End: 2020-03-16
Attending: PHYSICIAN ASSISTANT

## 2020-03-16 ENCOUNTER — TELEPHONE (OUTPATIENT)
Dept: NEUROSURGERY | Facility: CLINIC | Age: 50
End: 2020-03-16

## 2020-03-16 DIAGNOSIS — M54.16 LUMBAR RADICULOPATHY: ICD-10-CM

## 2020-03-16 NOTE — PROGRESS NOTES
"Bill Wisdom is a 49 year old male who is being evaluated via a billable telephone visit.      The patient has been notified of following:     \"This telephone visit will be conducted via a call between you and your physician/provider. We have found that certain health care needs can be provided without the need for a physical exam.  This service lets us provide the care you need with a short phone conversation.  If a prescription is necessary we can send it directly to your pharmacy.  If lab work is needed we can place an order for that and you can then stop by our lab to have the test done at a later time.    If during the course of the call the physician/provider feels a telephone visit is not appropriate, you will not be charged for this service.\"     Bill Wisdom complains of follow-up work comp.    I have reviewed and updated the patient's Past Medical History, Social History, Family History and Medication List.    ALLERGIES  Patient has no known allergies.    Saw Cinthia Torres PA-C in neurosurgery on 3/9/20, had lumbar spine MRI done, recommended PT for both neck and back pain. PT was suppsed to start today or tomorrow, may need to be rescheduled.   Requests a letter to be off work for ~2 weeks.  Pain primarily in mid-neck, radiates to shoulders and through the back, worsens with movement. No bladder/bowel incontinence, recent falls, saddle anesthesia. He still has a little oxycodone, which helps the pain, has otherwise not tried anything else to manage his pain.  Living in a Crisis center right now for depression/anxiety @ [facility] [place] [employer] Feels like its been helpful, that \"thinking-wise\" is going better, will be there for a few weeks, then will go back to his apartment.   Sleeping with CPAP and has nebulizers if needed.    Assessment/Plan:  (M54.2) Neck pain  Plan: acetaminophen (TYLENOL) 500 MG tablet          (M54.41,  G89.29) Chronic bilateral low back pain with right-sided sciatica  Plan: " acetaminophen (TYLENOL) 500 MG tablet,         ibuprofen (ADVIL/MOTRIN) 600 MG tablet    (M53.3) Sacroiliac joint dysfunction of right side  Plan: ibuprofen (ADVIL/MOTRIN) 600 MG tablet      Reviewed oxycodone not to be used for long-term pain management, encouraged to keep his appointment with physical therapy to help regain mobility and strength. Recommended alternating Tylenol and Ibuprofen for pain, as well as heat/cold application, he agrees with plan. Will provide letter requesting to excuse from work for up to 2 weeks while he recovers but any further extensions will require a face-to-face visit to repeat physical exam.    I have reviewed the note as documented above.  This accurately captures the substance of my conversation with the patient.    Phone call contact time  Call Started at 2:43 pm  Call Ended at 2:52 pm    JUNIOR Cabello CNP

## 2020-03-16 NOTE — TELEPHONE ENCOUNTER
Called patient to discuss MRI results per Cinthia AN (see below). Patient voiced agreement with plan. Advised him to call back if symptoms persist after PT.    ----- Message from Cinthia Torres PA-C sent at 3/16/2020 10:57 AM CDT -----  MRI with congenital narrowing of spinal canal and epidural fat. Recommend PT as recommended at appt.

## 2020-03-17 ENCOUNTER — VIRTUAL VISIT (OUTPATIENT)
Dept: INTERNAL MEDICINE | Facility: CLINIC | Age: 50
End: 2020-03-17

## 2020-03-17 ENCOUNTER — TELEPHONE (OUTPATIENT)
Dept: SURGERY | Facility: CLINIC | Age: 50
End: 2020-03-17

## 2020-03-17 DIAGNOSIS — G89.29 CHRONIC BILATERAL LOW BACK PAIN WITH RIGHT-SIDED SCIATICA: ICD-10-CM

## 2020-03-17 DIAGNOSIS — M53.3 SACROILIAC JOINT DYSFUNCTION OF RIGHT SIDE: ICD-10-CM

## 2020-03-17 DIAGNOSIS — M54.41 CHRONIC BILATERAL LOW BACK PAIN WITH RIGHT-SIDED SCIATICA: ICD-10-CM

## 2020-03-17 DIAGNOSIS — M54.2 NECK PAIN: ICD-10-CM

## 2020-03-17 RX ORDER — IBUPROFEN 600 MG/1
600 TABLET, FILM COATED ORAL EVERY 6 HOURS PRN
Qty: 120 TABLET | Refills: 0 | Status: SHIPPED | OUTPATIENT
Start: 2020-03-17 | End: 2021-07-09

## 2020-03-17 RX ORDER — ACETAMINOPHEN 500 MG
500-1000 TABLET ORAL EVERY 6 HOURS PRN
Qty: 90 TABLET | Refills: 1 | Status: SHIPPED | OUTPATIENT
Start: 2020-03-17 | End: 2021-07-09

## 2020-03-17 NOTE — TELEPHONE ENCOUNTER
Called patient to discuss his MRI findings. He states the plan is for physical therapy per his doctor. I discussed that all elective surgeries are on hold for the time being due to the COVID-19 virus directive. I did tell him I was taking him off the OR calendar for sleeve on 4/28/20 and will get back with him once we find out more about proceeding later down the road. He understands.

## 2020-03-17 NOTE — LETTER
Bill Wisdom  [address]   [place] [address]  : 1970  MRN:  [id number]      2020      To whom it may concern:    Mr. Bill Wisdom was evaluated in clinic on 3/4/20 and then by neurosurgery on 3/9/20.     He is advised to see physical therapy. Please excuse him from work through the end of the month (3/31/20) while he recovers from his recent injury and works with physical therapy in the interim.      Please let me know if you have any questions.    Thank you,        JUNIOR Cabello CNP

## 2020-03-18 ENCOUNTER — THERAPY VISIT (OUTPATIENT)
Dept: PHYSICAL THERAPY | Facility: CLINIC | Age: 50
End: 2020-03-18
Payer: OTHER MISCELLANEOUS

## 2020-03-18 DIAGNOSIS — M54.42 CHRONIC BILATERAL LOW BACK PAIN WITH BILATERAL SCIATICA: ICD-10-CM

## 2020-03-18 DIAGNOSIS — G89.29 CHRONIC BILATERAL LOW BACK PAIN WITH BILATERAL SCIATICA: ICD-10-CM

## 2020-03-18 DIAGNOSIS — M54.41 CHRONIC BILATERAL LOW BACK PAIN WITH BILATERAL SCIATICA: ICD-10-CM

## 2020-03-18 DIAGNOSIS — M54.2 NECK PAIN: Primary | ICD-10-CM

## 2020-03-18 PROCEDURE — 97162 PT EVAL MOD COMPLEX 30 MIN: CPT | Mod: GP | Performed by: PHYSICAL THERAPIST

## 2020-03-18 PROCEDURE — 97110 THERAPEUTIC EXERCISES: CPT | Mod: GP | Performed by: PHYSICAL THERAPIST

## 2020-03-18 NOTE — PROGRESS NOTES
[facility] Initial Evaluation  Subjective:    Patient Health History             Pertinent medical history includes: overweight, sleep disorder/apnea, diabetes, depression and emphysema.                                                       Objective:  System    Physical Exam    General     ROS    Assessment/Plan:

## 2020-03-18 NOTE — PROGRESS NOTES
"  Physical Therapy Initial Evaluation    Precautions/Restrictions/MD instructions: PT eval and treat.       Subjective History:    Injury/Condition Details:  Presenting Complaint Bill was dusting overhead, developed pain in his neck and shoulders, also radiates down into lower back and into posterior thighs. Hurts the most with moving.    Onset Timing/Date DOI 3/2/20   Mechanism Dusting at work      Symptom Behavior Details   Primary Pain Symptoms Location: B) neck, shoulders, low back radiating into B) legs  Quality: sharp, aching   Frequency: intermittent   Worst Pain 9/10   Best Pain 3/10   Symptom Provocators walking   Symptom Relievers \"comfortable position\" - not able to say if there is one consistent position that improves things   Time of day dependent? no   Symptom change since onset? No change      Prior Testing/Intervention for current condition:  Prior Tests MRI of lumbar and cervical spine -  Lumbar - \"Impression: Borderline congenitally narrow bony spinal canal, prominent epidural fat and mild spondylosis result in mild to moderate spinal canal stenosis at L3-4, mild at L1-3 and L4-5.\"  Cervical:   Impression:   1. Congenital spinal canal narrowing with superimposed spondylosis, most pronounced at C5-6.  2. No myelopathic cord signal.   Prior Treatment none      Lifestyle & General Medical History  General Health Reported by Patient Poor   Employment/Activities    Pertinent medical/surgical history Depression, diabetes, emphysema, overweight, sleep apnea   Patient Goals Reduce pain, be able to sleep     CERVICAL:    Posture: forward head, rounded shoulders, reduced lumbar lordosis    Neurological:    Motor Deficit: **pain reports generalized pain with all testing  Myotomes L R   C4 (shoulder elevation) 5/5 5/5   C5 (shoulder abduction) 4/5 4/5   C6 (elbow flexion) 4/5 4/5   C7 (elbow extension) 4/5 4/5   C8 (thumb extension) 5/5 5/5   T1 (finger add/abd) 5/5 5/5     AROM:  *Patient reports " generalized pain all directions, no direction worse than another  Flexion: 3 fingers from chest, pain  Extension: 75%, pain  Left side bend: WNL, pain  Right side bend: WNL, pain  Left rotation: WNL, pain  Right rotation: WNL, pain  Retraction: WNL, pain  Protraction: WNL, painless    Other Tests:  Flexibility: restricted B) trapezius, levator  Palpation: no significant tenderness to palpation  Static Tests: compression negative for UE sx    LUMBAR:    Dural Signs:   L R   Slump - -   SLR - -       AROM:   Direction ROM Pain   Flexion To distal thigh yes   Extension 75% yes   Side Bending L To mid-thigh yes   Side Bending R To mid-thigh yes     Palpation: no significant tenderness    Patient is a 49 year old male with cervical and lumbar complaints.    Patient has the following significant findings with corresponding treatment plan.                Diagnosis 1:  Neck pain  Pain -  manual therapy, self management, education and home program  Decreased ROM/flexibility - manual therapy and therapeutic exercise  Decreased joint mobility - manual therapy and therapeutic exercise  Decreased strength - therapeutic exercise and therapeutic activities  Decreased proprioception - neuro re-education and therapeutic activities  Impaired muscle performance - neuro re-education  Decreased function - therapeutic activities  Diagnosis 2:  Low back pain   Pain -  manual therapy, self management, education and home program  Decreased ROM/flexibility - manual therapy and therapeutic exercise  Decreased strength - therapeutic exercise and therapeutic activities  Decreased proprioception - neuro re-education and therapeutic activities  Impaired gait - gait training  Impaired muscle performance - neuro re-education  Decreased function - therapeutic activities    Therapy Evaluation Codes:   1) History comprised of:   Personal factors that impact the plan of care:      None.    Comorbidity factors that impact the plan of care are:       Diabetes, Depression, Emphysema and Overweight.     Medications impacting care: Anti-depressant, Muscle relaxant, Pain and Sleep.  2) Examination of Body Systems comprised of:   Body structures and functions that impact the plan of care:      Cervical spine and Lumbar spine.   Activity limitations that impact the plan of care are:      Bending, Lifting, Sitting, Working and Sleeping.  3) Clinical presentation characteristics are:   Evolving/Changing.  4) Decision-Making    Moderate complexity using standardized patient assessment instrument and/or measureable assessment of functional outcome.  Cumulative Therapy Evaluation is: Moderate complexity.    Previous and current functional limitations:  (See Goal Flow Sheet for this information)    Short term and Long term goals: (See Goal Flow Sheet for this information)     Communication ability:  Patient appears to be able to clearly communicate and understand verbal and written communication and follow directions correctly.  Treatment Explanation - The following has been discussed with the patient:   RX ordered/plan of care  Anticipated outcomes  Possible risks and side effects  This patient would benefit from PT intervention to resume normal activities.   Rehab potential is good.    Frequency:  1 X week, once daily  Duration:  for 8 weeks  Discharge Plan:  Achieve all LTG.  Independent in home treatment program.  Reach maximal therapeutic benefit.    Please refer to the daily flowsheet for treatment today, total treatment time and time spent performing 1:1 timed codes.

## 2020-03-18 NOTE — PROGRESS NOTES
[facility] Initial Evaluation  Subjective:    Patient Health History             Pertinent medical history includes: depression, diabetes, emphysema, overweight and sleep disorder/apnea.            Current medications:  Anti-depressants, muscle relaxants, pain medication and sleep medication.    Current occupation is .   Primary job tasks include:  Repetitive tasks.                                    Objective:  System    Physical Exam    General     ROS    Assessment/Plan:

## 2020-03-24 ENCOUNTER — DOCUMENTATION ONLY (OUTPATIENT)
Dept: SLEEP MEDICINE | Facility: CLINIC | Age: 50
End: 2020-03-24

## 2020-03-24 DIAGNOSIS — G47.33 OSA (OBSTRUCTIVE SLEEP APNEA): Primary | ICD-10-CM

## 2020-03-24 NOTE — PROGRESS NOTES
14  DAY STM VISIT    Diagnostic AHI: 29.7    PSG    Subjective measures:   Pt has a neck injury and has not been using as much due to pain.  No issues with mask fit.  He is struggling with waking to not enough pressure.       Assessment: Pt not meeting objective benchmarks for AHI and compliance  Patient meeting subjective benchmarks.     Action plan: order placed to provider for pressure change and pt to have 30 day STM visit.      Device type: Auto-CPAP    PAP settings: CPAP min 9.0 cm  H20       CPAP max 11.0 cm  H20             95th% pressure 10.9 cm  H20     Mask type:  Nasal Mask    Objective measures: 14 day rolling measures      Compliance  14 %      Leak  5.26 lpm  last  upload      AHI 7.65   last  upload      Average number of minutes 157      Objective measure goal  Compliance   Goal >70%  Leak   Goal < 24 lpm  AHI  Goal < 5  Usage  Goal >240        Total time spent on accessing and interpreting remote patient PAP therapy data  10 minutes    Total time spent counseling, coaching  and reviewing PAP therapy data with patient  12 minutes    [id number]  no  [id number]  no (3 day STM)

## 2020-03-26 PROBLEM — G89.29 CHRONIC BILATERAL LOW BACK PAIN WITH BILATERAL SCIATICA: Status: RESOLVED | Noted: 2020-03-18 | Resolved: 2020-03-26

## 2020-03-26 PROBLEM — M54.2 NECK PAIN: Status: RESOLVED | Noted: 2019-06-05 | Resolved: 2020-03-26

## 2020-03-26 PROBLEM — M54.42 CHRONIC BILATERAL LOW BACK PAIN WITH BILATERAL SCIATICA: Status: RESOLVED | Noted: 2020-03-18 | Resolved: 2020-03-26

## 2020-03-26 PROBLEM — M54.41 CHRONIC BILATERAL LOW BACK PAIN WITH BILATERAL SCIATICA: Status: RESOLVED | Noted: 2020-03-18 | Resolved: 2020-03-26

## 2020-03-26 NOTE — PROGRESS NOTES
Refer to SOAP as discharge summary as patient did not return to complete recommended course of therapy.

## 2020-04-01 ENCOUNTER — CARE COORDINATION (OUTPATIENT)
Dept: SURGERY | Facility: CLINIC | Age: 50
End: 2020-04-01

## 2020-04-01 NOTE — PROGRESS NOTES
Left voice message that he can schedule dietitian phone visits during this time of social distancing by calling [phone].  Will contact him beginning of May with updates.

## 2020-04-06 ENCOUNTER — TELEPHONE (OUTPATIENT)
Dept: INTERNAL MEDICINE | Facility: CLINIC | Age: 50
End: 2020-04-06

## 2020-04-06 NOTE — TELEPHONE ENCOUNTER
[facility] Call Center    Phone Message    May a detailed message be left on voicemail: yes     Reason for Call: Other: Pt is requesting a call back.  Pt states he is not able to get into physical therapy until April 20th.  Pt states he was given a letter to be off work util 3/31/20 but it is passed that date.  Pt would like a call back with next steps.     Action Taken: Message routed to:  [facility]    Travel Screening: Not Applicable

## 2020-04-08 ENCOUNTER — DOCUMENTATION ONLY (OUTPATIENT)
Dept: SLEEP MEDICINE | Facility: CLINIC | Age: 50
End: 2020-04-08

## 2020-04-08 NOTE — PROGRESS NOTES
30 DAY [facility] VISIT    Diagnostic AHI: 29.7    PSG    Message left for patient to return call     Assessment: Pt not meeting objective benchmarks for compliance and AHI, AHI is declining last few nights.   Action plan: waiting for patient to return call.   Patient has scheduled a follow up visit with Dr. Bustillos on 4/22/20.   Device type: Auto-CPAP  PAP settings: CPAP min 9.0 cm  H20     CPAP max 15.0 cm  H20         95th% pressure 14.4 cm  H20   Mask type:  Nasal Mask  Objective measures: 14 day rolling measures      Compliance  28 %      Leak  15.05 lpm  last  upload      AHI 7.21   last  Upload decreasing now nightly .       Average number of minutes 158      Objective measure goal  Compliance   Goal >70%  Leak   Goal < 24 lpm  AHI  Goal < 5  Usage  Goal >240        Total time spent on accessing and interpreting remote patient PAP therapy data  10 minutes    Total time spent counseling, coaching  and reviewing PAP therapy data with patient  0 minutes     [id number] no this call  [id number] no  at 3 or 14 day [facility]

## 2020-04-09 NOTE — PROGRESS NOTES
Patient returned call.    Subjective measures:   Patient continues to try and use as much has he can.  He has issues with pain and this is causing lower usage.   He will try an increase his usage.  No problems with pressures or mask fit.     Assessment: Pt not meeting objective benchmarks for compliance  Patient failing following subjective benchmarks: pain issues      Action plan:follow up per provider request      Total time spent counseling, coaching  and reviewing PAP therapy data with patient  3 minutes     [id number] no (this call)    [id number] no (previous call)

## 2020-04-13 ENCOUNTER — TELEPHONE (OUTPATIENT)
Dept: PHYSICAL THERAPY | Facility: CLINIC | Age: 50
End: 2020-04-13

## 2020-04-13 DIAGNOSIS — M54.42 RIGHT-SIDED LOW BACK PAIN WITH LEFT-SIDED SCIATICA: ICD-10-CM

## 2020-04-13 DIAGNOSIS — M54.41 RIGHT-SIDED LOW BACK PAIN WITH RIGHT-SIDED SCIATICA: ICD-10-CM

## 2020-04-13 NOTE — TELEPHONE ENCOUNTER
Spoke to patient about letting MD know that PT is closed at this time. I did give him option of Virtual Visit

## 2020-04-14 ENCOUNTER — TELEPHONE (OUTPATIENT)
Dept: INTERNAL MEDICINE | Facility: CLINIC | Age: 50
End: 2020-04-14

## 2020-04-14 ENCOUNTER — VIRTUAL VISIT (OUTPATIENT)
Dept: INTERNAL MEDICINE | Facility: CLINIC | Age: 50
End: 2020-04-14
Payer: COMMERCIAL

## 2020-04-14 DIAGNOSIS — M54.41 CHRONIC RIGHT-SIDED LOW BACK PAIN WITH BILATERAL SCIATICA: Primary | ICD-10-CM

## 2020-04-14 DIAGNOSIS — G89.29 CHRONIC RIGHT-SIDED LOW BACK PAIN WITH BILATERAL SCIATICA: Primary | ICD-10-CM

## 2020-04-14 DIAGNOSIS — M54.42 CHRONIC RIGHT-SIDED LOW BACK PAIN WITH BILATERAL SCIATICA: Primary | ICD-10-CM

## 2020-04-14 DIAGNOSIS — M54.2 NECK PAIN: ICD-10-CM

## 2020-04-14 NOTE — PROGRESS NOTES
"Bill Wisdom is a 50 year old male who is being evaluated via a billable telephone visit.      The patient has been notified of following:     \"This telephone visit will be conducted via a call between you and your physician/provider. We have found that certain health care needs can be provided without the need for a physical exam.  This service lets us provide the care you need with a short phone conversation.  If a prescription is necessary we can send it directly to your pharmacy.  If lab work is needed we can place an order for that and you can then stop by our lab to have the test done at a later time.    Telephone visits are billed at different rates depending on your insurance coverage. During this emergency period, for some insurers they may be billed the same as an in-person visit.  Please reach out to your insurance provider with any questions.    If during the course of the call the physician/provider feels a telephone visit is not appropriate, you will not be charged for this service.\"    Patient has given verbal consent for Telephone visit?  Yes    How would you like to obtain your AVS? Mail a copy    Subjective     Bill Wisodm is a 50 year old male who presents to clinic today for the following health issues:    HPI:    Still has low back pain with moving around. Pain worsens when sitting in a chair that is too low. Had 1 session of PT. Hoping for extension of work release. Trying to get out when he can. Has Tylenol/Ibuprofen to help with pain management.  Had Gout in the thumb. Lasted for ~4-5 days and improved with ibuprofen.    -------------------------------------    Patient Active Problem List   Diagnosis     CARDIOVASCULAR SCREENING; LDL GOAL LESS THAN 160     Family history of arteriosclerotic cardiovascular disease     Family history of diabetes mellitus     Spermatocele     Obesity     Esophageal reflux     Simple chronic bronchitis (H)     Right-sided low back pain with right-sided sciatica "     Right-sided low back pain with left-sided sciatica     CHANDLER (obstructive sleep apnea)     Morbid obesity (H)     Past Surgical History:   Procedure Laterality Date     HYDROCELECTOMY SCROTAL  2006     INJECT SACROILIAC JOINT Right 2019    Procedure: Right Sacroiliac Joint Injection;  Surgeon: Sina Laboy MD;  Location: [facility] OR     SPERMATOCELECTOMY  3/20/2012    Procedure:SPERMATOCELECTOMY; Left Spermatocelectomy; Surgeon:DELLA CROW; Location:[facility] OR       Social History     Tobacco Use     Smoking status: Former Smoker     Packs/day: 0.00     Types: Cigarettes     Last attempt to quit: 2019     Years since quittin.2     Smokeless tobacco: Never Used   Substance Use Topics     Alcohol use: No     Comment: quit drinking 2019     Family History   Problem Relation Age of Onset     C.A.D. Mother         triple bypass     Hypertension Father      Cerebrovascular Disease Father      Alcohol/Drug Sister          Current Outpatient Medications   Medication Sig Dispense Refill     acetaminophen (TYLENOL) 500 MG tablet Take 1-2 tablets (500-1,000 mg) by mouth every 6 hours as needed for mild pain or fever 90 tablet 1     albuterol (ACCUNEB) 1.25 MG/3ML neb solution Take 1 vial (1.25 mg) by nebulization every 6 hours as needed for shortness of breath / dyspnea or wheezing 60 vial 3     albuterol (PROVENTIL HFA) 108 (90 Base) MCG/ACT inhaler Inhale 2 puffs into the lungs every 6 hours as needed for shortness of breath / dyspnea or wheezing 8.5 g 3     allopurinol (ZYLOPRIM) 100 MG tablet Take 1 tablet (100 mg) by mouth daily 90 tablet 1     Cholecalciferol (VITAMIN D3) 75 MCG (3000 UT) TABS Take 1 tablet by mouth daily 30 tablet 11     cyclobenzaprine (FLEXERIL) 10 MG tablet Take 1 tablet (10 mg) by mouth 3 times daily as needed for muscle spasms 20 tablet 0     ibuprofen (ADVIL/MOTRIN) 600 MG tablet Take 1 tablet (600 mg) by mouth every 6 hours as needed for moderate pain 120 tablet 0      Multiple Vitamins-Minerals (MULTIVITAMIN ADULT) CHEW Take 1 chew tab by mouth daily 60 tablet 11     naproxen (NAPROSYN) 500 MG tablet Take 1 tablet (500 mg) by mouth 2 times daily (with meals) 24 tablet 0     order for DME Equipment being ordered: Nebulizer and equipment 1 each 0     order for DME Equipment being ordered: Cane 1 Device 0     Semaglutide,0.25 or 0.5MG/DOS, (OZEMPIC, 0.25 OR 0.5 MG/DOSE,) 2 MG/1.5ML SOPN Inject 0.5 mg Subcutaneous once a week 1.5 mL 2     tiZANidine (ZANAFLEX) 4 MG capsule Take 1 capsule (4 mg) by mouth 2 times daily as needed for muscle spasms 30 capsule 1     topiramate (TOPAMAX) 100 MG tablet Take 1 tablet (100 mg) by mouth daily (with dinner) 30 tablet 3     traZODone (DESYREL) 100 MG tablet Take 100 mg by mouth At Bedtime       oxyCODONE (ROXICODONE) 5 MG tablet Take 1 tablet (5 mg) by mouth every 6 hours as needed for pain (Patient not taking: Reported on 4/14/2020) 12 tablet 0       Reviewed and updated as needed this visit by Provider         Review of Systems   ROS COMP: Constitutional, HEENT, cardiovascular, pulmonary, gi and gu systems are negative, except as otherwise noted.       Objective   Reported vitals:  There were no vitals taken for this visit.   alert and no distress  PSYCH: Alert and oriented times 3; coherent speech, normal   rate and volume, able to articulate logical thoughts, able   to abstract reason, no tangential thoughts, no hallucinations   or delusions  His affect is pleasant  RESP: No cough, no audible wheezing, able to talk in full sentences  Remainder of exam unable to be completed due to telephone visits    Diagnostic Test Results:  none         Assessment/Plan:  1. Chronic right-sided low back pain with bilateral sciatica  2. Neck pain  PT available via virtual visits; encouraged pt to schedule. Reviewed importance of doing PT exercises at home in order to achieve full benefit. Should be able to return to work at this point. Use heat/cold  application, Tylenol or Ibuprofen for pain management.    Follow-up as needed for any changes or concerns    Phone call duration:  10 minutes    JUNIOR Cabello CNP

## 2020-04-14 NOTE — Clinical Note
Bill Martin is interested in scheduling a virtual visit for physical therapy. Sounds like Ronnie Rojo (PT) had emailed him about it but Bill wasn't sure about whether he'd be able to find the email. Can you follow-up with him to help him get that scheduled? Thanks! -Amalia

## 2020-04-14 NOTE — TELEPHONE ENCOUNTER
[facility] Call Center    Phone Message    May a detailed message be left on voicemail: yes     Reason for Call: Other: patients [facility] calling to request updated restrictions, Please call to discuss thank you.      Action Taken: Message routed to:  [facility]    Travel Screening: Not Applicable

## 2020-04-15 PROBLEM — M54.41 RIGHT-SIDED LOW BACK PAIN WITH RIGHT-SIDED SCIATICA: Status: RESOLVED | Noted: 2019-10-21 | Resolved: 2020-04-15

## 2020-04-15 PROBLEM — M54.42 RIGHT-SIDED LOW BACK PAIN WITH LEFT-SIDED SCIATICA: Status: RESOLVED | Noted: 2019-10-21 | Resolved: 2020-04-15

## 2020-04-15 NOTE — PROGRESS NOTES
Refer to SOAP as discharge summary as patient did not complete recommended course of physical therapy.

## 2020-04-16 NOTE — TELEPHONE ENCOUNTER
Patient has not given us permission to speak with this person, nor has given us a request.  Amanda Nagy, EMT at 2:10 PM on 4/16/2020.

## 2020-04-17 ENCOUNTER — VIRTUAL VISIT (OUTPATIENT)
Dept: PHYSICAL THERAPY | Facility: CLINIC | Age: 50
End: 2020-04-17
Payer: OTHER MISCELLANEOUS

## 2020-04-17 DIAGNOSIS — M54.41 ACUTE RIGHT-SIDED LOW BACK PAIN WITH RIGHT-SIDED SCIATICA: ICD-10-CM

## 2020-04-17 DIAGNOSIS — M54.2 NECK PAIN: Primary | ICD-10-CM

## 2020-04-17 PROCEDURE — 97110 THERAPEUTIC EXERCISES: CPT | Mod: 95 | Performed by: PHYSICAL THERAPIST

## 2020-04-17 NOTE — PROGRESS NOTES
"Physical Therapy Virtual Follow Up Visit      The patient has been notified of following:     \"This virtual visit will be conducted between you and your provider. We have found that certain health care needs can be provided without the need for physical presence.  This service lets us provide the care you need with a virtual visit.\"    Due to external, as well as internal [facility] management of the COVID-19 Virus, Bill Wisdom was not seen in our clinic.  As a substitution, we implemented a virtual visit to manage this patient's condition utilizing the [employer] virtual visit platform via the patient s existing code.  The provider, Celestino Rojo, reviewed the patient's chart, PTRx prescription, and spoke with the patient to determine the following telemedicine visit is appropriate and effective for the patient's care.    The following type of visit was completed:   Video Visit:  The [employer] platform uses a synchronous HIPAA compliant video stream for this patient encounter.        S: Bill Wisdom is a 50 year old male. Connected virtually on the [employer] platform to discuss their condition/progress. They noted improvements in Nothing at this point. He says he has pain with all movements.  They noted ongoing pain or limitations with walking or any prolonged > 10' sit, stand or walk.     Current pain level: 5/10  No changes in status    O: Patient demonstrated no changes in objective info   Standing Alignment:    Cervical/Thoracic:  Forward head  Shoulder/UE:  Rounded shoulders                           Lumbar/SI Evaluation  ROM:    AROM Thoracic: not assessed    Lumbar Myotomes:  not assessed            Lumbar DTR's:  not assessed      Cord Signs:  not assessed    Lumbar Dermtomes:  not assessed                  Lumbar Palpation:  not assessed                 Cervical/Thoracic Evaluation  Thoracic AROM: not assessed        Cervical Myotomes:  not assessed                  DTR's:  not assessed          Cervical " Dermatomes:  not assessed                    Cervical Palpation:  not assessed            Cord Sign:  not assessed                                          General   ROS  PTRx Content from today's visit:  Exercise Name: Cervical Retraction With Patient Overpressure - Reps: 10x - Sessions: 2  Exercise Name: Scapular Retraction/Depression - Reps: 10 x 3 sec hold - Sessions: 2  Exercise Name: Upper Trapezius Stretch - Reps: 2 x 20 sec hold each side - Sessions: 1-2  Exercise Name: Cervical ROM Rotation - Reps: 5-10x each side - Sessions: 2  Exercise Name: Standing Extension at Counter Supported - Reps: 10x - Sessions: 2x/day  Exercise Name: Sitting Flexion - Reps: 5-8x - Sessions: 2  Exercise Name: Abdominal Brace Transverse Abdominis - Reps: 10 x 5 sec hold - Sessions: 1    A:   Patient is not progressing as expected.  Response to therapy has shown lack of progress in  pain level, ROM , flexibility and function      P: Patient will continue with the exercise program assigned on their PTRx code and will add the following measures to manage their pain/condition: will follow up next week     Continue current treatment plan until patient demonstrates readiness to progress to higher level exercises.      Virtual visit contact time    Time of service began: 10:00 AM  Time of service ended: 1030 AM  Total Time for set up, visit, and documentation: 30 minutes    Payor: WORK COMP / Plan:  NONPROFIT INSURANCE TRUST / Product Type: *No Product type* /   .  Procedure Code/s   Therapeutic Exercise (48957): 30 minutes    I have reviewed the note as documented above.  This accurately captures the substance of my conversation with the patient.  Provider location: [facility] ([facility])  Patient location: home

## 2020-04-20 ENCOUNTER — CARE COORDINATION (OUTPATIENT)
Dept: SURGERY | Facility: CLINIC | Age: 50
End: 2020-04-20

## 2020-04-20 NOTE — PROGRESS NOTES
Left message for patient to call [phone] to schedule dietitian visits for April and May to work on getting to goal weight before surgery.

## 2020-04-21 ENCOUNTER — VIRTUAL VISIT (OUTPATIENT)
Dept: PHYSICAL THERAPY | Facility: CLINIC | Age: 50
End: 2020-04-21
Payer: OTHER MISCELLANEOUS

## 2020-04-21 VITALS — WEIGHT: 294 LBS | BODY MASS INDEX: 46.15 KG/M2 | HEIGHT: 67 IN

## 2020-04-21 DIAGNOSIS — M54.2 NECK PAIN: ICD-10-CM

## 2020-04-21 DIAGNOSIS — M54.41 ACUTE RIGHT-SIDED LOW BACK PAIN WITH RIGHT-SIDED SCIATICA: ICD-10-CM

## 2020-04-21 PROCEDURE — 97110 THERAPEUTIC EXERCISES: CPT | Mod: 95 | Performed by: PHYSICAL THERAPIST

## 2020-04-21 ASSESSMENT — MIFFLIN-ST. JEOR: SCORE: 2142.21

## 2020-04-21 NOTE — PATIENT INSTRUCTIONS
Use CPAP ever time you sleep--even with daytime naps.  Goals is over 4-5 hours every night.  I have put in orders to increase the starting pressure. Lets check in again in 1 month.  1.  Continue CPAP every night, for all hours that you are sleeping.  If you nap use CPAP.  As always, try to get at least 8 hours of sleep or more each day, and avoid sleep deprivation. Avoid alcohol.    2.  Reasons that you might need a change to your pressure therapy would be weight gain or loss, waking having inadvertently removed your CPAP overnight, having previously felt refreshed by sleep with CPAP use and now waking un-refreshed, and return of daytime sleepiness. Also, the development of new medical problems can sometimes affect breathing at night-heart failure, stroke, medications such as narcotics.    3.  Please bring CPAP with you if you are hospitalized.  If anticipating surgery be sure to discuss with your surgeon that you have sleep apnea and use PAP therapy.      4.  Maintain your equipment as recommended which includes routine cleaning and replacement of supplies.  Call DME for any questions regarding supplies or maintenance.    [facility], [facility] [phone]    5.  Do not drive on engage in potentially dangerous activities if feeling sleepy.    6.  Please see me again in 1 months and bring your machine and card to your follow-up visit.                    Tips for your CPAP and BIPAP use-    Mask fitting tips  Mask fitting exercise:    To improve your mask seal and your mobility at night, put mask on and secure in place.  Lie down in bed with full pressure and roll to one side, adjust headgear while in that position to eliminate any leaks. Repeat process rolling to other side.     The mask seal does not have to be perfect:   CPAP machines are designed to make up for small leaks. However, you will not tolerate leaks blowing in your eyes so you will need to adjust.   Any  leak should only be near or at the bottom of the mask.  We expect your mask to leak slightly at night.    Do not over-tighten the headgear straps, tighter IS NOT better, we expect minimal leak.    First try re-positioning the mask or headgear before tightening the headgear straps.  Mask leaks are expected due to changing sleeping positions. Try pulling the mask away from your skin allowing the cushion to re-inflate will minimize the leak.  If you struggle for a good fit, try turning the CPAP off and then readjust the mask by pulling it away from your face and then turning back on the CPAP.        Humidifier tips  Humidifiers can be adjusted to increase or decrease the amount of moisture according to your comfort level. You may need to adjust this frequently at first, but then might only change it with seasonal weather changes.     Try INCREASING the humidity if:  You experience a dry, irritated nasal passage or throat.  You have a runny, drippy nose or sneezing fits after using CPAP.  You experience nasal congestion during or after CPAP use.    Try DECREASING the humidity if:  You have excessive condensation or  rain out  in the tubing or mask.  Otherwise keep the tubing warm during the night by running it underneath the blankets or pillow.      Clinic visit after initial CPAP and BIPAP set-up   Bring your equipment with you to your 4 week follow up clinic visit.  We will be extracting your data from the machine.        Travel  Always take your equipment with you.  If you fly with your equipment bring it on with you as a carry on.  Medical equipment does not count as a carry on.    If you travel international the machines take 110-240.  The only adapter needed is the adapter that will fit into the receptacle (outlet).    You may also want to bring an extension cord as many hotel rooms have limited outlets at the bedside.  Do not travel with water in your humidifier chamber.     Cleaning and Maintenance  Guidelines    Equipment Frequency Cleaning Method   Mask First Day    Daily      Weekly Soak mask in hot soapy water for 30 minutes, rinse and air dry.  Wipe nasal cushion with a hot soapy (Ivory, baby shampoo) cloth and rinse.  Baby wipes may also be used.  Do not use anti-bacterial soaps,Tricia  liquid soap, rubbing alcohol, bleach or ammonia.  Wash frame in hot soapy water (Ivory, baby shampoo) rinse and let air dry   Headgear Biweekly Wash in hot soapy water, rinse and air dry   Reusable Gray Filter Weekly Wash in hot soapy water, rinse, put in towel squeeze moisture out, let air dry   Disposable White Filter Check Weekly Replace when brown or gray in color; at least every 2 to 3 months   Humidifier Chamber Daily    Weekly Empty distilled water from humidifier and let air dry    Hand wash in hot soapy water, rinse and air dry   Tubing Weekly Wash in hot soapy water, rinse and let air dry   Mask, Tubing and Humidifier Chamber As needed Disinfect: Soak in 1 part distilled white vinegar to 3 parts hot water for 30 minutes, rinse well and air dry  Not the material headgear        MASK AND SUPPLY REORDERING and EQUIPMENT NEEDS through your DME and per your insurance  Reminder: Most insurance companies will allow for a new mask, headgear, tubing, and reusable gray filter every six months.  Disposable white ultra-fine filters are covered monthly.      HOME AND SAFETY INSTRUCTIONS    Do not use frayed or cracked electrical cords, multi plug adaptors, or switched receptacles    Do not immerse electrical equipment into water    Assure that electrical cords do not become a tripping hazard      Your BMI is Body mass index is 46.05 kg/m .  Weight management is a personal decision.  If you are interested in exploring weight loss strategies, the following discussion covers the approaches that may be successful. Body mass index (BMI) is one way to tell whether you are at a healthy weight, overweight, or obese. It measures your  weight in relation to your height.  A BMI of 18.5 to 24.9 is in the healthy range. A person with a BMI of 25 to 29.9 is considered overweight, and someone with a BMI of 30 or greater is considered obese. More than two-thirds of American adults are considered overweight or obese.  Being overweight or obese increases the risk for further weight gain. Excess weight may lead to heart disease and diabetes.  Creating and following plans for healthy eating and physical activity may help you improve your health.  Weight control is part of healthy lifestyle and includes exercise, emotional health, and healthy eating habits. Careful eating habits lifelong are the mainstay of weight control. Though there are significant health benefits from weight loss, long-term weight loss with diet alone may be very difficult to achieve- studies show long-term success with dietary management in less than 10% of people. Attaining a healthy weight may be especially difficult to achieve in those with severe obesity. In some cases, medications, devices and surgical management might be considered.  What can you do?  If you are overweight or obese and are interested in methods for weight loss, you should discuss this with your provider.     Consider reducing daily calorie intake by 500 calories.     Keep a food journal.     Avoiding skipping meals, consider cutting portions instead.    Diet combined with exercise helps maintain muscle while optimizing fat loss. Strength training is particularly important for building and maintaining muscle mass. Exercise helps reduce stress, increase energy, and improves fitness. Increasing exercise without diet control, however, may not burn enough calories to loose weight.       Start walking three days a week 10-20 minutes at a time    Work towards walking thirty minutes five days a week     Eventually, increase the speed of your walking for 1-2 minutes at time    In addition, we recommend that you review  healthy lifestyles and methods for weight loss available through the National Institutes of Health patient information sites:  http://win.niddk.nih.gov/publications/index.htm    And look into health and wellness programs that may be available through your health insurance provider, employer, local community center, or kenia club.    Weight management plan: Patient was referred to their PCP to discuss a diet and exercise plan.

## 2020-04-21 NOTE — PROGRESS NOTES
"Bill Wisdom is a 50 year old male who is being evaluated via a billable telephone visit.      The patient has been notified of following:     \"This telephone visit will be conducted via a call between you and your physician/provider. We have found that certain health care needs can be provided without the need for a physical exam.  This service lets us provide the care you need with a short phone conversation.  If a prescription is necessary we can send it directly to your pharmacy.  If lab work is needed we can place an order for that and you can then stop by our lab to have the test done at a later time.    Telephone visits are billed at different rates depending on your insurance coverage. During this emergency period, for some insurers they may be billed the same as an in-person visit.  Please reach out to your insurance provider with any questions.    If during the course of the call the physician/provider feels a telephone visit is not appropriate, you will not be charged for this service.\"    Patient has given verbal consent for Telephone visit?  Yes    How would you like to obtain your AVS? Mail a copy    Bel Rossi MA on 4/21/2020 at 10:24 AM          "

## 2020-04-21 NOTE — PROGRESS NOTES
"Physical Therapy Virtual Follow Up Visit      The patient has been notified of following:     \"This virtual visit will be conducted between you and your provider. We have found that certain health care needs can be provided without the need for physical presence.  This service lets us provide the care you need with a virtual visit.\"    Due to external, as well as internal [facility] management of the COVID-19 Virus, Bill Wisdom was not seen in our clinic.  As a substitution, we implemented a virtual visit to manage this patient's condition utilizing the PTRx virtual visit platform via the patient s existing code.  The provider, Celestino Rjoo, reviewed the patient's chart, PTRx prescription, and spoke with the patient to determine the following telemedicine visit is appropriate and effective for the patient's care.    The following type of visit was completed:   Telephone Visit:  A telephone visit was used in conjunction with the online PTRx exercise platform.        S: Bill Wisdom is a 50 year old male. Connected virtually on the PTRx platform to discuss their condition/progress. They noted improvements in Not feeling well today and was not ready to do video visit.  They noted ongoing pain or limitations with Neck and B shoulders.     Current pain level: 7/10  Was e-mailed instructions again on how to get to web site to look at exercise plan    O: Patient demonstrated NA     PTRx Content from today's visit:  Exercise Name: Cervical Retraction With Patient Overpressure, Sets: 1 - Reps: 10x - Sessions: 4, Notes: every 4 hours  Exercise Name: Scapular Retraction/Depression - Reps: 10 x 3 sec hold - Sessions: 4, Notes: every 4 hours  Exercise Name: Upper Trapezius Stretch - Reps: 2 x 20 sec hold each side - Sessions: 1-2  Exercise Name: Levator Scapulae Stretch, Sets: 1 - Reps: 2 - Sessions: 1-2, Notes: 20 seconds each  Exercise Name: Cervical ROM Rotation, Sets: 1 - Reps: 5-10x each side - Sessions: " 1-2  Exercise Name: Standing Extension at Counter Supported - Reps: 10x - Sessions: 2x/day  Exercise Name: Sitting Flexion - Reps: 5-8x - Sessions: 2  Exercise Name: Abdominal Brace Transverse Abdominis - Reps: 10 x 5 sec hold - Sessions: 1    A:   Patient is not progressing as expected.  Response to therapy has shown lack of progress in  pain level and function      P: Patient will continue with the exercise program assigned on their PTRx code and will add the following measures to manage their pain/condition: Will set up visit for 2 weeks and e-mail web site for him to follow tutorials      Continue current treatment plan until patient demonstrates readiness to progress to higher level exercises.      Virtual visit contact time    Time of service began: 12:30 PM  Time of service ended: 1245 PM  Total Time for set up, visit, and documentation: 15 minutes    Payor: WORK COMP / Plan:  NONPROFIT INSURANCE TRUST / Product Type: *No Product type* /   .  Procedure Code/s   Therapeutic Exercise (14896): 15 minutes    I have reviewed the note as documented above.  This accurately captures the substance of my conversation with the patient.  Provider location: [facility] ([facility]/State)  Patient location: Home

## 2020-04-22 ENCOUNTER — DOCUMENTATION ONLY (OUTPATIENT)
Dept: SLEEP MEDICINE | Facility: CLINIC | Age: 50
End: 2020-04-22

## 2020-04-22 ENCOUNTER — VIRTUAL VISIT (OUTPATIENT)
Dept: SLEEP MEDICINE | Facility: CLINIC | Age: 50
End: 2020-04-22
Payer: COMMERCIAL

## 2020-04-22 DIAGNOSIS — E66.9 OBESITY WITHOUT SERIOUS COMORBIDITY, UNSPECIFIED CLASSIFICATION, UNSPECIFIED OBESITY TYPE: ICD-10-CM

## 2020-04-22 DIAGNOSIS — G47.33 OSA (OBSTRUCTIVE SLEEP APNEA): Primary | ICD-10-CM

## 2020-04-22 DIAGNOSIS — M54.2 NECK PAIN: ICD-10-CM

## 2020-04-22 PROCEDURE — 99214 OFFICE O/P EST MOD 30 MIN: CPT | Mod: TEL | Performed by: INTERNAL MEDICINE

## 2020-04-22 NOTE — PROGRESS NOTES
[facility] 9-15CM TO 11-15 CM H2O PER DR. ORTIZ'S ORDER.

## 2020-04-22 NOTE — PROGRESS NOTES
"Blil Wisdom is a 50 year old male who is being evaluated via a billable telephone visit.      The patient has been notified of following:     \"This telephone visit will be conducted via a call between you and your physician/provider. We have found that certain health care needs can be provided without the need for a physical exam.  This service lets us provide the care you need with a short phone conversation.  If a prescription is necessary we can send it directly to your pharmacy.  If lab work is needed we can place an order for that and you can then stop by our lab to have the test done at a later time.    Telephone visits are billed at different rates depending on your insurance coverage. During this emergency period, for some insurers they may be billed the same as an in-person visit.  Please reach out to your insurance provider with any questions.    If during the course of the call the physician/provider feels a telephone visit is not appropriate, you will not be charged for this service.\"    Patient has given verbal consent for Telephone visit?  Yes    How would you like to obtain your AVS? Mail a copy    Bel Rossi MA on 4/21/2020 at 10:24 AM    Additional provider notes:     Chief complaint:  Follow-up resuming CPAP    History of Present Illness:  50-year-old gentleman with history of obesity, COPD,  and severe obstructive sleep apnea.  He recently resumed CPAP therapy.  Due to the pandemic his previously scheduled bariatric surgery is now on hold.  Goal was for him to meet compliance prior to bariatric surgery.  He has suffered a work-related injury affecting his neck and back which is significantly affected his sleep.  He feels he can sleep for a few hours and then he needs to take the CPAP off and change positions.  He often does not put the CPAP back on.  He does find that his sleep with the CPAP is a little bit better.  Overall he feels a little less sleepy during the day but he is still " taking naps.  Pressure change was accomplished about 2 to 3 weeks ago for elevateded AHI.  Reviewing data it appears that to have significantly improved the AHI.  He is tolerating it well.  In fact he prefers a little bit higher pressures when he first puts the CPAP on.    Total score - Talpa: 11 (4/21/2020 10:00 AM) (Less than 10 normal) Improved from 13    MG Total Score: 11 (normal 0-7, mild 8-14, moderate 15-21, severe 22-28) Improved from 15    Past Medical History:   Diagnosis Date     COPD (chronic obstructive pulmonary disease) (H)      Gastro-oesophageal reflux disease      History of tobacco abuse      Obese      CHANDLER (obstructive sleep apnea)      Prediabetes        No Known Allergies    Current Outpatient Medications   Medication     acetaminophen (TYLENOL) 500 MG tablet     albuterol (ACCUNEB) 1.25 MG/3ML neb solution     albuterol (PROVENTIL HFA) 108 (90 Base) MCG/ACT inhaler     allopurinol (ZYLOPRIM) 100 MG tablet     Cholecalciferol (VITAMIN D3) 75 MCG (3000 UT) TABS     cyclobenzaprine (FLEXERIL) 10 MG tablet     ibuprofen (ADVIL/MOTRIN) 600 MG tablet     Multiple Vitamins-Minerals (MULTIVITAMIN ADULT) CHEW     naproxen (NAPROSYN) 500 MG tablet     Semaglutide,0.25 or 0.5MG/DOS, (OZEMPIC, 0.25 OR 0.5 MG/DOSE,) 2 MG/1.5ML SOPN     tiZANidine (ZANAFLEX) 4 MG capsule     topiramate (TOPAMAX) 100 MG tablet     traZODone (DESYREL) 100 MG tablet     order for DME     order for DME     oxyCODONE (ROXICODONE) 5 MG tablet     No current facility-administered medications for this visit.        Social History     Socioeconomic History     Marital status: Single     Spouse name: Not on file     Number of children: Not on file     Years of education: Not on file     Highest education level: Not on file   Occupational History     Not on file   Social Needs     Financial resource strain: Not on file     Food insecurity     Worry: Not on file     Inability: Not on file     Transportation needs     Medical: Not  "on file     Non-medical: Not on file   Tobacco Use     Smoking status: Former Smoker     Packs/day: 0.00     Types: Cigarettes     Last attempt to quit: 2019     Years since quittin.2     Smokeless tobacco: Never Used   Substance and Sexual Activity     Alcohol use: No     Comment: quit drinking 2019     Drug use: Not Currently     Sexual activity: Not Currently     Partners: Female     Comment: Past history of cocaine use   Lifestyle     Physical activity     Days per week: Not on file     Minutes per session: Not on file     Stress: Not on file   Relationships     Social connections     Talks on phone: Not on file     Gets together: Not on file     Attends Sikh service: Not on file     Active member of club or organization: Not on file     Attends meetings of clubs or organizations: Not on file     Relationship status: Not on file     Intimate partner violence     Fear of current or ex partner: Not on file     Emotionally abused: Not on file     Physically abused: Not on file     Forced sexual activity: Not on file   Other Topics Concern     Parent/sibling w/ CABG, MI or angioplasty before 65F 55M? No   Social History Narrative     Not on file       Family History   Problem Relation Age of Onset     C.A.D. Mother         triple bypass     Hypertension Father      Cerebrovascular Disease Father      Alcohol/Drug Sister          EXAM:Alert  Ht 1.702 m (5' 7\")   Wt 133.4 kg (294 lb)   BMI 46.05 kg/m     Pulmonary: Speaking full sentences  Psychiatric: Mood and affect appear normal    PSG 2019  Weight 288, BMI 44.2  AHI 32.6, TCM showed 215 minutes greater than 55 mmHg, time spent with oxygen saturation at or below 88% was 14 minutes     Titration PSG 2019  This was completed with a head of bed elevated 30 degrees with a wedge pillow, CPAP final settings of 10 cm of water were effective at treating sleep disordered breathing     [facility] sleep study 2017 showed " severe obstructive sleep apnea with a CPAP to titrated to 10 with mild oxygen desaturation actual report not available      ResMed   Auto-PAP 9.0 - 15.0 cmH2O 30 day usage data:    23% of days with > 4 hours of use. 6/30 days with no use.   Average use 166 minutes per day.   95%ile Leak 13.68 L/min.   CPAP 95% pressure 14 cm.   AHI 6.01 events per hour.   (AHI average over last 14 days 4.07)    ASSESSMENT:  50-year-old gentleman with obesity, severe sleep apnea with hypoxemia, COPD, pain interfering with CPAP use and sleep.  He also has a sense of not quite enough pressure as he is falling asleep.  Needs to improve compliance in order to keep his machine and proceed with bariatric surgery.    PLAN:  Orders generated to increase starting pressure from 9 up to 11.  Patient was counseled about the importance of using CPAP even during his daytime naps.  This would help improve his compliance.  He was reminded of the importance of meeting his goals in order to keep his machine and proceed with his goal of going through bariatric surgery when surgeries to resume.  He should continue to work with primary care to optimize pain management.  Continue efforts at smoking cessation.  We will have the sleep therapy coaches check compliance in the next 2 weeks.  Patient should follow-up with me in 4 weeks.  He is agreeable with this plan.  Please see age instructions for further details of counseling provided.      Elizabeth Bustillos M.D.  Pulmonary/Critical Care/Sleep Medicine    [facility]   Floor 1, [address]   [address] [place], [place] [address] [address]   Appointments: [phone]    The above note was dictated using voice recognition software and may include typographical errors. Please contact the author for any clarifications.          Phone call duration: 12 minutes

## 2020-05-05 ENCOUNTER — VIRTUAL VISIT (OUTPATIENT)
Dept: PHYSICAL THERAPY | Facility: CLINIC | Age: 50
End: 2020-05-05
Payer: OTHER MISCELLANEOUS

## 2020-05-05 DIAGNOSIS — M54.41 ACUTE RIGHT-SIDED LOW BACK PAIN WITH RIGHT-SIDED SCIATICA: ICD-10-CM

## 2020-05-05 DIAGNOSIS — M54.2 NECK PAIN: ICD-10-CM

## 2020-05-05 PROCEDURE — 97110 THERAPEUTIC EXERCISES: CPT | Mod: 95 | Performed by: PHYSICAL THERAPIST

## 2020-05-05 NOTE — PROGRESS NOTES
"Physical Therapy Virtual Follow Up Visit      The patient has been notified of following:     \"This virtual visit will be conducted between you and your provider. We have found that certain health care needs can be provided without the need for physical presence.  This service lets us provide the care you need with a virtual visit.\"    Due to external, as well as internal [facility] management of the COVID-19 Virus, Bill Wisdom was not seen in our clinic.  As a substitution, we implemented a virtual visit to manage this patient's condition utilizing the PTRx virtual visit platform via the patient s existing code.  The provider, Ulises Barnett, reviewed the patient's chart, PTRx prescription, and spoke with the patient to determine the following telemedicine visit is appropriate and effective for the patient's care.    The following type of visit was completed:   Video Visit:  A video visit was used in conjunction with the online PTRx exercise platform.        S: Bill Wisdom is a 50 year old male. Connected virtually on the PTRx platform to discuss their condition/progress. They noted improvements in nothing.  They noted ongoing pain or limitations with pain with walking standing, bending.     Current pain level: 7/10  Recently flared things up after he fell on the stairs about a week ago.  Still sore from the fall.  Reports that there could be plans to bring him back to work on light duty.  Reports that low back is painful when walking.  Has been trying get out and walk daily.  Neck and shoulders are worse with bending.  Reports he has not been referencing the online exercises.  Just tries to \"do what he can\".    O: Patient demonstrated verbal understanding of HEP.     PTRx Content from today's visit:  Exercise Name: Cervical Retraction With Patient Overpressure, Sets: 1 - Reps: 10x - Sessions: 2, Notes: every 4 hours  Exercise Name: Scapular Retraction/Depression - Reps: 10 x 3 sec hold - Sessions: 2, " Notes: every 4 hours  Exercise Name: Upper Trapezius Stretch - Reps: 2 x 20 sec hold each side - Sessions: 2  Exercise Name: Levator Scapulae Stretch, Sets: 1 - Reps: 3 - Sessions: 2, Notes: 30 seconds each  Exercise Name: Cervical ROM Rotation, Sets: 1 - Reps: 5-10x each side - Sessions: 2  Exercise Name: Shoulder Scapular Retraction with Tubing, Sets: 1 - Reps: 20 - Sessions: 2, Notes: red band  Exercise Name: Standing Extension at Counter Supported, Sets: 1 - Reps: 10x - Sessions: 2  Exercise Name: Sitting Flexion, Sets: 1 - Reps: 5-10 - Sessions: 2  Exercise Name: Supine Lumbar Hip Roll, Sets: 1 - Reps: 10 to each side - Sessions: 2  Exercise Name: Posterior Pelvic Tilt, Sets: 1 - Reps: x10 with a 5 second hold - Sessions: 2    A:   Patient is progressing slower than expected.  Response to therapy has shown lack of progress in  pain level  Patient does not seem to be 100% compliant with HEP to this point which may be limiting his progress.    P: Patient will continue with the exercise program assigned on their PTRx code and will add the following measures to manage their pain/condition: Re-engage patient with PTRx program through emailing a PDF file to him as he has not been consistent with checking the exercises online.  Advanced low back exercises  Advanced neck and shoulder exercises including periscapular strengthening     Current treatment program is being advanced to more complex exercises.      Virtual visit contact time    Time of service began: 11:00 AM  Time of service ended: 11:30 AM  Total Time for set up, visit, and documentation: 35 minutes    Payor: WORK COMP / Plan:  NONPROFIT INSURANCE TRUST / Product Type: *No Product type* /   .  Procedure Code/s   Therapeutic Exercise (53841): 30 minutes    I have reviewed the note as documented above.  This accurately captures the substance of my conversation with the patient.  Provider location: [place], [place] ([facility]/State)  Patient location: [facility]  [place]

## 2020-05-06 ENCOUNTER — DOCUMENTATION ONLY (OUTPATIENT)
Dept: SLEEP MEDICINE | Facility: CLINIC | Age: 50
End: 2020-05-06

## 2020-05-06 NOTE — PROGRESS NOTES
Patient returned call.    Subjective measures:  Patient feels things are going better, however he is still having some pain issues causing arousals.  He is happy with the pressure change.       Assessment: Pt not meeting objective benchmarks for compliance, however usage has been increasing.   Patient meeting subjective benchmarks.     Action plan:2 week STM recheck appt scheduled      Total time spent counseling, coaching  and reviewing PAP therapy data with patient  5 minutes     [id number] (this call)    [id number] no (previous call)

## 2020-05-06 NOTE — PROGRESS NOTES
STM recheck after pressure change    Diagnostic AHI: 29.7    PSG    Message left for patient to return call     Assessment: Pt not meeting objective benchmarks for compliance       Action plan: waiting for patient to return call.       Device type: Auto-CPAP    PAP settings: CPAP min 11.0 cm  H20       CPAP max 15.0 cm  H20           95th% pressure 14.3 cm  H20     Mask type:  Nasal Mask    Objective measures: 14 day rolling measures      Compliance  64 %      Leak  15.17 lpm  last  upload      AHI 5.46   last  upload      Average number of minutes 272      Objective measure goal  Compliance   Goal >70%  Leak   Goal < 24 lpm  AHI  Goal < 5  Usage  Goal >240        Total time spent on accessing and interpreting remote patient PAP therapy data  12 minutes    Total time spent counseling, coaching  and reviewing PAP therapy data with patient  0 minutes    [id number]

## 2020-05-12 ENCOUNTER — VIRTUAL VISIT (OUTPATIENT)
Dept: INTERNAL MEDICINE | Facility: CLINIC | Age: 50
End: 2020-05-12
Payer: COMMERCIAL

## 2020-05-12 DIAGNOSIS — E11.9 TYPE 2 DIABETES MELLITUS WITHOUT COMPLICATION, WITHOUT LONG-TERM CURRENT USE OF INSULIN (H): ICD-10-CM

## 2020-05-12 DIAGNOSIS — Z87.09 HISTORY OF COPD: ICD-10-CM

## 2020-05-12 DIAGNOSIS — J41.0 SIMPLE CHRONIC BRONCHITIS (H): Primary | ICD-10-CM

## 2020-05-12 ASSESSMENT — PAIN SCALES - GENERAL: PAINLEVEL: EXTREME PAIN (8)

## 2020-05-12 NOTE — PROGRESS NOTES
"Bill Wisdom is a 50 year old male who is being evaluated via a billable telephone visit.      The patient has been notified of following:     \"This telephone visit will be conducted via a call between you and your physician/provider. We have found that certain health care needs can be provided without the need for a physical exam.  This service lets us provide the care you need with a short phone conversation.  If a prescription is necessary we can send it directly to your pharmacy.  If lab work is needed we can place an order for that and you can then stop by our lab to have the test done at a later time.    Telephone visits are billed at different rates depending on your insurance coverage. During this emergency period, for some insurers they may be billed the same as an in-person visit.  Please reach out to your insurance provider with any questions.    If during the course of the call the physician/provider feels a telephone visit is not appropriate, you will not be charged for this service.\"    Patient has given verbal consent for Telephone visit?  Yes    What phone number would you like to be contacted at? [phone]    How would you like to obtain your AVS? Mail a copy    Subjective     Bill Wisdom is a 50 year old male who presents to clinic today for the following health issues:    [facility]      Niko Jama ([facility]) on line with Derek. Fax note to--[phone]. Was working for [employer], had a work-related injury, was trying to get something together for him to get back to work. 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     Concern about his underlying conditions and potential increased risk for complications if he were to contract COVID.  Hx COPD--currently controlled with albuterol inhaler/neb. Used to be on another inhaler too; still in PT. Takes nebulizer \"occasionally over course of couple days\". If exerts himself, will get a " little wheezy; inhaler doesn't often help. If gets excited, will breathe faster. Quit smoking Jan 2019. No recent PFTs.    Mental health/depression/anxiety with COVID. One of his nephews recently passed away from COVID. Concerned about health disparities within black community. Continues following with [facility], Dr. Ribeiro--psychiatrist, and Juli Godoy(?) for therapy.    Diabetes--A1c was 6.5% in Oct 2019. Stopped Ozempic (Semaglutide) prior to surgery, hasn't restarted yet. He felt it helped control his appetite better, denies side effects from it. Has gained a little weight back. Was going to have his lap sleve gastrectomy with Dr. Billy in April, which was postponed d/t COVID pandemic. Due to f/u with Bariatric surgery team, previously saw Anna Marie Bejarano PA-C.    No paperwork required at this point, just wanted to keep me informed about his current work status.  Requests verification of diabetes and COPD and treatment.      -------------------------------------    Patient Active Problem List   Diagnosis     CARDIOVASCULAR SCREENING; LDL GOAL LESS THAN 160     Family history of arteriosclerotic cardiovascular disease     Family history of diabetes mellitus     Spermatocele     Obesity     Esophageal reflux     Simple chronic bronchitis (H)     Neck pain     Right-sided low back pain with right-sided sciatica     CHANDLER (obstructive sleep apnea)     Morbid obesity (H)     Past Surgical History:   Procedure Laterality Date     HYDROCELECTOMY SCROTAL  2006     INJECT SACROILIAC JOINT Right 9/16/2019    Procedure: Right Sacroiliac Joint Injection;  Surgeon: Sina Laboy MD;  Location:  OR     SPERMATOCELECTOMY  3/20/2012    Procedure:SPERMATOCELECTOMY; Left Spermatocelectomy; Surgeon:DELLA CROW; Location: OR       Social History     Tobacco Use     Smoking status: Former Smoker     Packs/day: 0.00     Types: Cigarettes     Last attempt to quit: 1/16/2019     Years  since quittin.3     Smokeless tobacco: Never Used   Substance Use Topics     Alcohol use: No     Comment: quit drinking 2019     Family History   Problem Relation Age of Onset     C.A.D. Mother         triple bypass     Hypertension Father      Cerebrovascular Disease Father      Alcohol/Drug Sister          Current Outpatient Medications   Medication Sig Dispense Refill     acetaminophen (TYLENOL) 500 MG tablet Take 1-2 tablets (500-1,000 mg) by mouth every 6 hours as needed for mild pain or fever 90 tablet 1     albuterol (ACCUNEB) 1.25 MG/3ML neb solution Take 1 vial (1.25 mg) by nebulization every 6 hours as needed for shortness of breath / dyspnea or wheezing 60 vial 3     albuterol (PROVENTIL HFA) 108 (90 Base) MCG/ACT inhaler Inhale 2 puffs into the lungs every 6 hours as needed for shortness of breath / dyspnea or wheezing 8.5 g 3     allopurinol (ZYLOPRIM) 100 MG tablet Take 1 tablet (100 mg) by mouth daily 90 tablet 1     Cholecalciferol (VITAMIN D3) 75 MCG (3000 UT) TABS Take 1 tablet by mouth daily 30 tablet 11     cyclobenzaprine (FLEXERIL) 10 MG tablet Take 1 tablet (10 mg) by mouth 3 times daily as needed for muscle spasms 20 tablet 0     ibuprofen (ADVIL/MOTRIN) 600 MG tablet Take 1 tablet (600 mg) by mouth every 6 hours as needed for moderate pain 120 tablet 0     Multiple Vitamins-Minerals (MULTIVITAMIN ADULT) CHEW Take 1 chew tab by mouth daily 60 tablet 11     naproxen (NAPROSYN) 500 MG tablet Take 1 tablet (500 mg) by mouth 2 times daily (with meals) 24 tablet 0     Semaglutide,0.25 or 0.5MG/DOS, (OZEMPIC, 0.25 OR 0.5 MG/DOSE,) 2 MG/1.5ML SOPN Inject 0.5 mg Subcutaneous once a week 1.5 mL 2     tiZANidine (ZANAFLEX) 4 MG capsule Take 1 capsule (4 mg) by mouth 2 times daily as needed for muscle spasms 30 capsule 1     topiramate (TOPAMAX) 100 MG tablet Take 1 tablet (100 mg) by mouth daily (with dinner) 30 tablet 3     traZODone (DESYREL) 100 MG tablet Take 100 mg by mouth At Bedtime        order for DME Equipment being ordered: Nebulizer and equipment (Patient not taking: Reported on 4/21/2020) 1 each 0     order for DME Equipment being ordered: Cane (Patient not taking: Reported on 4/21/2020) 1 Device 0     oxyCODONE (ROXICODONE) 5 MG tablet Take 1 tablet (5 mg) by mouth every 6 hours as needed for pain (Patient not taking: Reported on 4/14/2020) 12 tablet 0       Reviewed and updated as needed this visit by Provider         Review of Systems   Constitutional, HEENT, cardiovascular, pulmonary, gi and gu systems are negative, except as otherwise noted.       Objective   Reported vitals:  There were no vitals taken for this visit.   healthy, alert and no distress  PSYCH: Alert and oriented times 3; coherent speech, normal   rate and volume, able to articulate logical thoughts, able   to abstract reason, no tangential thoughts, no hallucinations   or delusions  His affect is normal and pleasant  RESP: No cough, no audible wheezing, able to talk in full sentences  Remainder of exam unable to be completed due to telephone visits    Diagnostic Test Results:  Labs reviewed in Epic        Assessment/Plan:  1. Simple chronic bronchitis (H)  2. History of COPD  Due for repeat PFTs (okay to defer until post-COVID). Symptoms currently adequately controlled with rescue inhaler alone, per pt report. Given his history of lung disease, would be at increased risk for complications if he were to contract COVID. Encourage social distancing, careful hand hygiene, continue to avoid smoking.  - General PFT Lab (Please always keep checked); Future    3. Type 2 diabetes mellitus without complication, without long-term current use of insulin (H)  Most recent A1c borderline--reviewed restarting Ozempic (otherwise is not on anything to control DM). F/u with bariatric surgery team.    Follow-up prn    Phone call duration:  20 minutes    JUNIOR Cabello CNP

## 2020-05-12 NOTE — NURSING NOTE
Chief Complaint   Patient presents with     Diabetes     pt would like to discuss diabetes     COPD     pt would like to disucss copd       Viola Beyer CMA, EMT at 1:57 PM on 5/12/2020.

## 2020-05-13 NOTE — NURSING NOTE
Mailed AVS and encounter note to patient, faxed encounter note to Niko per provider's instructions.   Ananya Mike CMA at 1:46 PM on 5/13/2020.

## 2020-05-19 ENCOUNTER — VIRTUAL VISIT (OUTPATIENT)
Dept: PHYSICAL THERAPY | Facility: CLINIC | Age: 50
End: 2020-05-19
Payer: OTHER MISCELLANEOUS

## 2020-05-19 DIAGNOSIS — M54.2 NECK PAIN: ICD-10-CM

## 2020-05-19 PROCEDURE — 97110 THERAPEUTIC EXERCISES: CPT | Mod: 95 | Performed by: PHYSICAL THERAPIST

## 2020-05-19 NOTE — PROGRESS NOTES
"Physical Therapy Virtual Follow Up Visit      The patient has been notified of following:     \"This virtual visit will be conducted between you and your provider. We have found that certain health care needs can be provided without the need for physical presence.  This service lets us provide the care you need with a virtual visit.\"    Due to external, as well as internal [facility] management of the COVID-19 Virus, Bill Wisdom was not seen in our clinic.  As a substitution, we implemented a virtual visit to manage this patient's condition utilizing the PTRx virtual visit platform via the patient s existing code.  The provider, Ulises Barnett, reviewed the patient's chart, PTRx prescription, and spoke with the patient to determine the following telemedicine visit is appropriate and effective for the patient's care.    The following type of visit was completed:   Telephone Visit:  A telephone visit was used in conjunction with the online PTRx exercise platform.        S: Bill Wisdom is a 50 year old male. Connected virtually on the PTRx platform to discuss their condition/progress. They noted improvements in ability to perform some light activity like walking.  They noted ongoing pain or limitations with pain and stiffness if he is not active.     Current pain level: 7/10  Reports that he has been trying to be more active and perform his exercises and be more active.  Would like to continue with in-person visits with Anu moving forward.    O:      PTRx Content from today's visit:  Exercise Name: Cervical Retraction With Patient Overpressure, Sets: 1 - Reps: 10x - Sessions: 2, Notes: every 4 hours  Exercise Name: Scapular Retraction/Depression - Reps: 10 x 3 sec hold - Sessions: 2, Notes: every 4 hours  Exercise Name: Upper Trapezius Stretch - Reps: 2 x 20 sec hold each side - Sessions: 2  Exercise Name: Levator Scapulae Stretch, Sets: 1 - Reps: 3 - Sessions: 2, Notes: 30 seconds each  Exercise Name: " Cervical ROM Rotation, Sets: 1 - Reps: 5-10x each side - Sessions: 2  Exercise Name: Shoulder Scapular Retraction with Tubing, Sets: 1 - Reps: 20 - Sessions: 2, Notes: red band  Exercise Name: Standing Extension at Counter Supported, Sets: 1 - Reps: 10x - Sessions: 2  Exercise Name: Sitting Flexion, Sets: 1 - Reps: 5-10 - Sessions: 2  Exercise Name: Supine Lumbar Hip Roll, Sets: 1 - Reps: 10 to each side - Sessions: 2  Exercise Name: Posterior Pelvic Tilt, Sets: 1 - Reps: x10 with a 5 second hold - Sessions: 2    A:   Progressing Slowly  Response to therapy has shown an improvement in  activity  Derek has been gradually increasing his activity.  Would benefit from in-person visits moving forward and patient reports he has already been attending in-person visits at [facility] so it would make sense to have him continue with that moving forward.    P: Patient will continue with the exercise program assigned on their PTRx code and will add the following measures to manage their pain/condition: Discontinue virtual visits through [facility] as Derek has been attending in-person PT visits with [facility].     Discharge from [facility]      Virtual visit contact time    Time of service began: 11:00 AM  Time of service ended: 11:10 AM  Total Time for set up, visit, and documentation: 15 minutes    Payor: WORK COMP / Plan:  NONPROFIT INSURANCE TRUST / Product Type: *No Product type* /   .  Procedure Code/s   Therapeutic Exercise (63113): 10 minutes    I have reviewed the note as documented above.  This accurately captures the substance of my conversation with the patient.  Provider location: [place], [place] ([facility])  Patient location: Home

## 2020-05-20 ENCOUNTER — DOCUMENTATION ONLY (OUTPATIENT)
Dept: SLEEP MEDICINE | Facility: CLINIC | Age: 50
End: 2020-05-20

## 2020-05-20 NOTE — PROGRESS NOTES
STM recheck     Diagnostic AHI: 29.7    PSG    Subjective measures:   Patient feels things are getting using better.  Discussed usage being lower than required for his insurance requirements for coverage.   He was encouraged to put more effort into using the device for greater than four hours each night.      Assessment: Pt not meeting objective benchmarks for compliance  Patient meeting subjective benchmarks.     Action plan: 2 week STM recheck appt scheduled      Device type: Auto-CPAP    PAP settings: CPAP min 11.0 cm  H20       CPAP max 15.0 cm  H20           95th% pressure 14.8 cm  H20     Mask type:  Nasal Mask    Objective measures: 14 day rolling measures      Compliance  57 %      Leak  6 lpm  last  upload      AHI 6   last  upload      Average number of minutes 238      Objective measure goal  Compliance   Goal >70%  Leak   Goal < 24 lpm  AHI  Goal < 5  Usage  Goal >240        Total time spent on accessing and interpreting remote patient PAP therapy data  10 minutes    Total time spent counseling, coaching  and reviewing PAP therapy data with patient  3  minutes

## 2020-05-20 NOTE — Clinical Note
Hi spoke with Bill today.  He has a follow up visit with you on 5/22.  His usage has been declining again :(  I stressed for him to make a good effort during the next two weeks to increase usage over 4 hours each night.  He is at day 75 right now.    Fingers crossed.  I did put in another STM recheck in two weeks again.    Cele

## 2020-05-21 VITALS — BODY MASS INDEX: 42.95 KG/M2 | WEIGHT: 290 LBS | HEIGHT: 69 IN

## 2020-05-21 ASSESSMENT — PAIN SCALES - GENERAL: PAINLEVEL: EXTREME PAIN (8)

## 2020-05-21 ASSESSMENT — MIFFLIN-ST. JEOR: SCORE: 2155.81

## 2020-05-21 NOTE — PROGRESS NOTES
"Bill Wisdom is a 50 year old male who is being evaluated via a billable telephone visit.      The patient has been notified of following:     \"This telephone visit will be conducted via a call between you and your physician/provider. We have found that certain health care needs can be provided without the need for a physical exam.  This service lets us provide the care you need with a short phone conversation.  If a prescription is necessary we can send it directly to your pharmacy.  If lab work is needed we can place an order for that and you can then stop by our lab to have the test done at a later time.    Telephone visits are billed at different rates depending on your insurance coverage. During this emergency period, for some insurers they may be billed the same as an in-person visit.  Please reach out to your insurance provider with any questions.    If during the course of the call the physician/provider feels a telephone visit is not appropriate, you will not be charged for this service.\"    Patient has given verbal consent for Telephone visit?  Yes    What phone number would you like to be contacted at? [phone]     How would you like to obtain your AVS? Mail a copy     I called patient at 9 and he was unavailable to talk, and the [employer] bus.  He requested that I call him back.  I will call him back at 10.    Chief complaint: Follow-up CPAP compliance    History of Present Illness: 50-year-old gentleman with history of obesity, COPD, severe obstructive sleep apnea.  His bariatric surgery which was planned is continues to be on hold due to the pandemic.  He is continuing to work on achieving goal CPAP compliance.  He has been struggling with pain in his neck and back from a work-related accident earlier this year.  He states that he ate will typically go to bed around 11 PM and wake up around 2:30 or 3 AM with due to the pain.  He will take naps later on and he is trying to use his CPAP during " all sleep periods.  He remains abstinent from smoking.  He denies any new sleep concerns.  Pressures feel adequate.  He continues to feel that when he uses the Pap he feels more energetic during wakefulness. He believes he is benefiting from CPAP use.    Total score - [place]: 14 (5/21/2020  9:30 AM) (Less than 10 normal)    MG Total Score: 12 (normal 0-7, mild 8-14, moderate 15-21, severe 22-28)    Past Medical History:   Diagnosis Date     COPD (chronic obstructive pulmonary disease) (H)      Gastro-oesophageal reflux disease      History of tobacco abuse      Obese      CHANDLER (obstructive sleep apnea)      Prediabetes        No Known Allergies    Current Outpatient Medications   Medication     acetaminophen (TYLENOL) 500 MG tablet     albuterol (ACCUNEB) 1.25 MG/3ML neb solution     albuterol (PROVENTIL HFA) 108 (90 Base) MCG/ACT inhaler     allopurinol (ZYLOPRIM) 100 MG tablet     Cholecalciferol (VITAMIN D3) 75 MCG (3000 UT) TABS     cyclobenzaprine (FLEXERIL) 10 MG tablet     ibuprofen (ADVIL/MOTRIN) 600 MG tablet     Multiple Vitamins-Minerals (MULTIVITAMIN ADULT) CHEW     naproxen (NAPROSYN) 500 MG tablet     order for DME     order for DME     oxyCODONE (ROXICODONE) 5 MG tablet     Semaglutide,0.25 or 0.5MG/DOS, (OZEMPIC, 0.25 OR 0.5 MG/DOSE,) 2 MG/1.5ML SOPN     tiZANidine (ZANAFLEX) 4 MG capsule     topiramate (TOPAMAX) 100 MG tablet     traZODone (DESYREL) 100 MG tablet     No current facility-administered medications for this visit.        Social History     Socioeconomic History     Marital status: Single     Spouse name: Not on file     Number of children: Not on file     Years of education: Not on file     Highest education level: Not on file   Occupational History     Not on file   Social Needs     Financial resource strain: Not on file     Food insecurity     Worry: Not on file     Inability: Not on file     Transportation needs     Medical: Not on file     Non-medical: Not on file   Tobacco Use      "Smoking status: Former Smoker     Packs/day: 0.00     Types: Cigarettes     Last attempt to quit: 2019     Years since quittin.3     Smokeless tobacco: Never Used   Substance and Sexual Activity     Alcohol use: No     Comment: quit drinking 2019     Drug use: Not Currently     Sexual activity: Not Currently     Partners: Female     Comment: Past history of cocaine use   Lifestyle     Physical activity     Days per week: Not on file     Minutes per session: Not on file     Stress: Not on file   Relationships     Social connections     Talks on phone: Not on file     Gets together: Not on file     Attends Christian service: Not on file     Active member of club or organization: Not on file     Attends meetings of clubs or organizations: Not on file     Relationship status: Not on file     Intimate partner violence     Fear of current or ex partner: Not on file     Emotionally abused: Not on file     Physically abused: Not on file     Forced sexual activity: Not on file   Other Topics Concern     Parent/sibling w/ CABG, MI or angioplasty before 65F 55M? No   Social History Narrative     Not on file       Family History   Problem Relation Age of Onset     C.A.D. Mother         triple bypass     Hypertension Father      Cerebrovascular Disease Father      Alcohol/Drug Sister          EXAM:Alert NAD  Ht 1.753 m (5' 9\")   Wt 131.5 kg (290 lb)   BMI 42.83 kg/m     Pulmonary: Speaking full sentences  Psychiatric: Mood and affect appear normal    PSG 2019  Weight 288, BMI 44.2  AHI 32.6, TCM showed 215 minutes greater than 55 mmHg, time spent with oxygen saturation at or below 88% was 14 minutes     Titration PSG 2019  This was completed with a head of bed elevated 30 degrees with a wedge pillow, CPAP final settings of 10 cm of water were effective at treating sleep disordered breathing     [facility] sleep study 2017 showed severe obstructive sleep apnea with a CPAP to " titrated to 10 with mild oxygen desaturation actual report not available    [employer]   Auto-PAP 11.0 - 15.0 cmH2O 30 day usage data reviewed with patient:    63% of days with > 4 hours of use. 2/30 days with no use.   Average use 268 minutes per day.   95%ile Leak 10.63 L/min.   CPAP 95% pressure 14.5 cm.   AHI 5.86 events per hour.     ASSESSMENT:  50-year-old gentleman with severe obstructive sleep apnea, COPD, obesity, pain.  He has improving compliance by download but has not quite met goal of greater than 70% of the nights over 4 hours.  Factors that contribute are irregular sleep schedule and pain.  AHI is nearly at goal of less than 5.  He reports benefit from the use of CPAP.    PLAN:  We will make a slight adjustment to his upper limit EPAP pressure.  Patient is agreeable with this.  He understands the importance of optimizing compliance in the next couple of weeks in order to keep his machine.  He also needs to optimize compliance in order to be able to proceed with bariatric surgery.  He is reminded of the severity of the sleep apnea and the importance of treating it due to health concerns.  I will be checking in with him in 4 weeks.  Sleep therapy management team is also following up in a couple weeks.    Twenty-five minutes spent with patient, >50% spent counseling and coordinating care.      Elizabeth Bustillos M.D.  Pulmonary/Critical Care/Sleep Medicine    [facility]   Floor [address], [address]   [address] [place] [address]. [place] [address]   Appointments: [phone]    The above note was dictated using voice recognition software and may include typographical errors. Please contact the author for any clarifications.            Phone call duration: 8 minutes    Elizabeth Bustillos MD

## 2020-05-21 NOTE — PATIENT INSTRUCTIONS
For Tips about PAP and COVID -19:    https://www.thoracic.org/patients/patient-resources/resources/covid-19-and-home-pap-therapy.pdf    1.  Continue CPAP every night, for all hours that you are sleeping.  If you nap use CPAP.  As always, try to get at least 8 hours of sleep or more each day, keep a regular sleep schedule, and avoid sleep deprivation. Avoid alcohol.    2.  Reasons that you might need a change to your pressure therapy would be weight gain or loss, waking having inadvertently removed your CPAP overnight, having previously felt refreshed by sleep with CPAP use and now waking un-refreshed, and return of daytime sleepiness. Also, the development of new medical problems  (such as heart failure, stroke, medications such as narcotics) can sometimes affect breathing at night and change your PAP therapy needs.  3.  Please bring CPAP with you if you are hospitalized.  If anticipating surgery be sure to discuss with your surgeon that you have sleep apnea and use PAP therapy.      4.  Maintain your equipment as recommended which includes routine cleaning and replacement of supplies.  Call DME for any questions regarding supplies or maintenance.    [facility], [facility] [phone]    5.  Do not drive on engage in potentially dangerous activities if feeling sleepy.    6.  Please see me again in 1 months and bring your machine and card to your follow-up visit.          Tips for your CPAP and BIPAP use-    Mask fitting tips  Mask fitting exercise:    To improve your mask seal and your mobility at night, put mask on and secure in place.  Lie down in bed with full pressure and roll to one side, adjust headgear while in that position to eliminate any leaks. Repeat process rolling to other side.     The mask seal does not have to be perfect:   CPAP machines are designed to make up for small leaks. However, you will not tolerate leaks blowing in your eyes so you will  need to adjust.   Any leak should only be near or at the bottom of the mask.  We expect your mask to leak slightly at night.    Do not over-tighten the headgear straps, tighter IS NOT better, we expect minimal leak.    First try re-positioning the mask or headgear before tightening the headgear straps.  Mask leaks are expected due to changing sleeping positions. Try pulling the mask away from your skin allowing the cushion to re-inflate will minimize the leak.  If you struggle for a good fit, try turning the CPAP off and then readjust the mask by pulling it away from your face and then turning back on the CPAP.        Humidifier tips  Humidifiers can be adjusted to increase or decrease the amount of moisture according to your comfort level. You may need to adjust this frequently at first, but then might only change it with seasonal weather changes.     Try INCREASING the humidity if:  You experience a dry, irritated nasal passage or throat.  You have a runny, drippy nose or sneezing fits after using CPAP.  You experience nasal congestion during or after CPAP use.    Try DECREASING the humidity if:  You have excessive condensation or  rain out  in the tubing or mask.  Otherwise keep the tubing warm during the night by running it underneath the blankets or pillow.      Clinic visit after initial CPAP and BIPAP set-up   Bring your equipment with you to your 4 week follow up clinic visit.  We will be extracting your data from the machine.        Travel  Always take your equipment with you.  If you fly with your equipment bring it on with you as a carry on.  Medical equipment does not count as a carry on.    If you travel international the machines take 110-240.  The only adapter needed is the adapter that will fit into the receptacle (outlet).    You may also want to bring an extension cord as many hotel rooms have limited outlets at the bedside.  Do not travel with water in your humidifier chamber.     Cleaning and  Maintenance Guidelines    Equipment Frequency Cleaning Method   Mask First Day    Daily      Weekly Soak mask in hot soapy water for 30 minutes, rinse and air dry.  Wipe nasal cushion with a hot soapy (Ivory, baby shampoo) cloth and rinse.  Baby wipes may also be used.  Do not use anti-bacterial soaps,Tricia  liquid soap, rubbing alcohol, bleach or ammonia.  Wash frame in hot soapy water (Ivory, baby shampoo) rinse and let air dry   Headgear Biweekly Wash in hot soapy water, rinse and air dry   Reusable Gray Filter Weekly Wash in hot soapy water, rinse, put in towel squeeze moisture out, let air dry   Disposable White Filter Check Weekly Replace when brown or gray in color; at least every 2 to 3 months   Humidifier Chamber Daily    Weekly Empty distilled water from humidifier and let air dry    Hand wash in hot soapy water, rinse and air dry   Tubing Weekly Wash in hot soapy water, rinse and let air dry   Mask, Tubing and Humidifier Chamber As needed Disinfect: Soak in 1 part distilled white vinegar to 3 parts hot water for 30 minutes, rinse well and air dry  Not the material headgear        MASK AND SUPPLY REORDERING and EQUIPMENT NEEDS through your DME and per your insurance  Reminder: Most insurance companies will allow for a new mask, headgear, tubing, and reusable gray filter every six months.  Disposable white ultra-fine filters are covered monthly.      HOME AND SAFETY INSTRUCTIONS    Do not use frayed or cracked electrical cords, multi plug adaptors, or switched receptacles    Do not immerse electrical equipment into water    Assure that electrical cords do not become a tripping hazard      Your BMI is Body mass index is 42.83 kg/m .  Weight management is a personal decision.  If you are interested in exploring weight loss strategies, the following discussion covers the approaches that may be successful. Body mass index (BMI) is one way to tell whether you are at a healthy weight, overweight, or obese. It  measures your weight in relation to your height.  A BMI of 18.5 to 24.9 is in the healthy range. A person with a BMI of 25 to 29.9 is considered overweight, and someone with a BMI of 30 or greater is considered obese. More than two-thirds of American adults are considered overweight or obese.  Being overweight or obese increases the risk for further weight gain. Excess weight may lead to heart disease and diabetes.  Creating and following plans for healthy eating and physical activity may help you improve your health.  Weight control is part of healthy lifestyle and includes exercise, emotional health, and healthy eating habits. Careful eating habits lifelong are the mainstay of weight control. Though there are significant health benefits from weight loss, long-term weight loss with diet alone may be very difficult to achieve- studies show long-term success with dietary management in less than 10% of people. Attaining a healthy weight may be especially difficult to achieve in those with severe obesity. In some cases, medications, devices and surgical management might be considered.  What can you do?  If you are overweight or obese and are interested in methods for weight loss, you should discuss this with your provider.     Consider reducing daily calorie intake by 500 calories.     Keep a food journal.     Avoiding skipping meals, consider cutting portions instead.    Diet combined with exercise helps maintain muscle while optimizing fat loss. Strength training is particularly important for building and maintaining muscle mass. Exercise helps reduce stress, increase energy, and improves fitness. Increasing exercise without diet control, however, may not burn enough calories to loose weight.       Start walking three days a week 10-20 minutes at a time    Work towards walking thirty minutes five days a week     Eventually, increase the speed of your walking for 1-2 minutes at time    In addition, we recommend that  you review healthy lifestyles and methods for weight loss available through the National Institutes of Health patient information sites:  http://win.niddk.nih.gov/publications/index.htm    And look into health and wellness programs that may be available through your health insurance provider, employer, local community center, or kenia club.    Weight management plan: Patient was referred to their PCP to discuss a diet and exercise plan.

## 2020-05-22 ENCOUNTER — VIRTUAL VISIT (OUTPATIENT)
Dept: SLEEP MEDICINE | Facility: CLINIC | Age: 50
End: 2020-05-22
Payer: COMMERCIAL

## 2020-05-22 DIAGNOSIS — G47.33 OSA (OBSTRUCTIVE SLEEP APNEA): Primary | ICD-10-CM

## 2020-05-22 DIAGNOSIS — E66.01 CLASS 3 SEVERE OBESITY DUE TO EXCESS CALORIES WITH SERIOUS COMORBIDITY AND BODY MASS INDEX (BMI) OF 40.0 TO 44.9 IN ADULT (H): ICD-10-CM

## 2020-05-22 DIAGNOSIS — E66.813 CLASS 3 SEVERE OBESITY DUE TO EXCESS CALORIES WITH SERIOUS COMORBIDITY AND BODY MASS INDEX (BMI) OF 40.0 TO 44.9 IN ADULT (H): ICD-10-CM

## 2020-05-22 PROCEDURE — 99214 OFFICE O/P EST MOD 30 MIN: CPT | Mod: TEL | Performed by: INTERNAL MEDICINE

## 2020-05-26 ENCOUNTER — VIRTUAL VISIT (OUTPATIENT)
Dept: INTERNAL MEDICINE | Facility: CLINIC | Age: 50
End: 2020-05-26
Payer: COMMERCIAL

## 2020-05-26 ENCOUNTER — TELEPHONE (OUTPATIENT)
Dept: INTERNAL MEDICINE | Facility: CLINIC | Age: 50
End: 2020-05-26
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
## 2020-05-26 DIAGNOSIS — M54.2 NECK PAIN: ICD-10-CM

## 2020-05-26 DIAGNOSIS — E66.01 MORBID OBESITY (H): ICD-10-CM

## 2020-05-26 DIAGNOSIS — M54.12 CERVICAL RADICULOPATHY: ICD-10-CM

## 2020-05-26 RX ORDER — BIOTIN 10 MG
1 TABLET ORAL DAILY
Qty: 60 TABLET | Refills: 5 | Status: SHIPPED | OUTPATIENT
Start: 2020-05-26

## 2020-05-26 RX ORDER — NAPROXEN 500 MG/1
500 TABLET ORAL 2 TIMES DAILY PRN
Qty: 24 TABLET | Refills: 0 | Status: SHIPPED | OUTPATIENT
Start: 2020-05-26 | End: 2021-07-09

## 2020-05-26 ASSESSMENT — PAIN SCALES - GENERAL: PAINLEVEL: SEVERE PAIN (7)

## 2020-05-26 NOTE — PROGRESS NOTES
"Bill Wisdom is a 50 year old male who is being evaluated via a billable telephone visit.      The patient has been notified of following:     \"This telephone visit will be conducted via a call between you and your physician/provider. We have found that certain health care needs can be provided without the need for a physical exam.  This service lets us provide the care you need with a short phone conversation.  If a prescription is necessary we can send it directly to your pharmacy.  If lab work is needed we can place an order for that and you can then stop by our lab to have the test done at a later time.    Telephone visits are billed at different rates depending on your insurance coverage. During this emergency period, for some insurers they may be billed the same as an in-person visit.  Please reach out to your insurance provider with any questions.    If during the course of the call the physician/provider feels a telephone visit is not appropriate, you will not be charged for this service.\"    Patient has given verbal consent for Telephone visit?  Yes    What phone number would you like to be contacted at? [facility] [phone]    How would you like to obtain your AVS? Mail a copy    Subjective     Bill Wisdom is a 50 year old male who presents via phone visit today for the following health issues:    HPI    F/u fall--Missed the bottom step, hit head on side of the wall/heater vent. Happened about 2 weeks ago. No LOC. Doesn't think he injured himself. No significant new pain, continues to have neck/shoulder pain at the same level as before. No new headaches, no vision changes, no weakness. Able to move extremities without difficulty.   Mild tingling from the neck through the shoulders, unchanged from his baseline.   Has been doing PT for the neck pain with [facility] at [facility] in [place]. His care team wanted to make sure he was okay to proceed with PT.   Eating salads more. Using peanut butter and occ meat " for protein. Started Ozempic again  Requests refill for multivitamin.     -------------------------------------    Patient Active Problem List   Diagnosis     CARDIOVASCULAR SCREENING; LDL GOAL LESS THAN 160     Family history of arteriosclerotic cardiovascular disease     Family history of diabetes mellitus     Spermatocele     Obesity     Esophageal reflux     Simple chronic bronchitis (H)     Neck pain     Right-sided low back pain with right-sided sciatica     CHANDLER (obstructive sleep apnea)     Morbid obesity (H)     Past Surgical History:   Procedure Laterality Date     HYDROCELECTOMY SCROTAL  2006     INJECT SACROILIAC JOINT Right 2019    Procedure: Right Sacroiliac Joint Injection;  Surgeon: Sina Laboy MD;  Location: [facility] OR     SPERMATOCELECTOMY  3/20/2012    Procedure:SPERMATOCELECTOMY; Left Spermatocelectomy; Surgeon:DELLA CROW; Location: OR       Social History     Tobacco Use     Smoking status: Former Smoker     Packs/day: 0.00     Types: Cigarettes     Last attempt to quit: 2019     Years since quittin.3     Smokeless tobacco: Never Used   Substance Use Topics     Alcohol use: No     Comment: quit drinking 2019     Family History   Problem Relation Age of Onset     C.A.D. Mother         triple bypass     Hypertension Father      Cerebrovascular Disease Father      Alcohol/Drug Sister          Current Outpatient Medications   Medication Sig Dispense Refill     acetaminophen (TYLENOL) 500 MG tablet Take 1-2 tablets (500-1,000 mg) by mouth every 6 hours as needed for mild pain or fever 90 tablet 1     albuterol (ACCUNEB) 1.25 MG/3ML neb solution Take 1 vial (1.25 mg) by nebulization every 6 hours as needed for shortness of breath / dyspnea or wheezing 60 vial 3     albuterol (PROVENTIL HFA) 108 (90 Base) MCG/ACT inhaler Inhale 2 puffs into the lungs every 6 hours as needed for shortness of breath / dyspnea or wheezing 8.5 g 3     allopurinol (ZYLOPRIM) 100 MG tablet Take  1 tablet (100 mg) by mouth daily 90 tablet 1     Cholecalciferol (VITAMIN D3) 75 MCG (3000 UT) TABS Take 1 tablet by mouth daily 30 tablet 11     cyclobenzaprine (FLEXERIL) 10 MG tablet Take 1 tablet (10 mg) by mouth 3 times daily as needed for muscle spasms 20 tablet 0     ibuprofen (ADVIL/MOTRIN) 600 MG tablet Take 1 tablet (600 mg) by mouth every 6 hours as needed for moderate pain 120 tablet 0     Multiple Vitamins-Minerals (MULTIVITAMIN ADULT) CHEW Take 1 chew tab by mouth daily 60 tablet 11     naproxen (NAPROSYN) 500 MG tablet Take 1 tablet (500 mg) by mouth 2 times daily (with meals) 24 tablet 0     order for DME Equipment being ordered: Nebulizer and equipment 1 each 0     order for DME Equipment being ordered: Cane 1 Device 0     oxyCODONE (ROXICODONE) 5 MG tablet Take 1 tablet (5 mg) by mouth every 6 hours as needed for pain 12 tablet 0     Semaglutide,0.25 or 0.5MG/DOS, (OZEMPIC, 0.25 OR 0.5 MG/DOSE,) 2 MG/1.5ML SOPN Inject 0.5 mg Subcutaneous once a week 1.5 mL 2     tiZANidine (ZANAFLEX) 4 MG capsule Take 1 capsule (4 mg) by mouth 2 times daily as needed for muscle spasms 30 capsule 1     topiramate (TOPAMAX) 100 MG tablet Take 1 tablet (100 mg) by mouth daily (with dinner) 30 tablet 3     traZODone (DESYREL) 100 MG tablet Take 100 mg by mouth At Bedtime         Reviewed and updated as needed this visit by Provider         Review of Systems   Constitutional, HEENT, cardiovascular, pulmonary, gi and gu systems are negative, except as otherwise noted.       Objective   Reported vitals:  There were no vitals taken for this visit.   alert and no distress  PSYCH: Alert and oriented times 3; coherent speech, normal   rate and volume, able to articulate logical thoughts, able   to abstract reason, no tangential thoughts, no hallucinations   or delusions  His affect is pleasant  Neuro: no overt deficits, speech is clear  RESP: No cough, no audible wheezing, able to talk in full sentences  Remainder of exam  unable to be completed due to telephone visits    Diagnostic Test Results:  none         Assessment/Plan:  1. Cervical radiculopathy  2. Fall down stairs, initial encounter  3. Neck pain  Derek reports he is at his baseline in terms of neck and shoulder pain. No need for repeat imaging at this time. He appears neurologically intact over the phone without any new concerning findings. Okay to proceed with PT as he has been doing at [facility]. Okay to use Naproxen for pain BID prn, take with meals, can also supplement with Tylenol.    4. Morbid obesity (H)  Refill for multivitamin provided.    Follow-up prn if any changes or concerns.    Phone call duration:  13 minutes    JUNIOR Cabello CNP

## 2020-06-03 ENCOUNTER — DOCUMENTATION ONLY (OUTPATIENT)
Dept: SLEEP MEDICINE | Facility: CLINIC | Age: 50
End: 2020-06-03

## 2020-06-03 NOTE — PROGRESS NOTES
[facility] recheck for usage.      Diagnostic AHI: 29.7    PSG    Message left for patient to return call     Assessment: Pt not meeting objective benchmarks for AHI      Action plan: waiting for patient to return call.       Device type: Auto-CPAP    PAP settings: CPAP min 11.0 cm  H20       CPAP max 17.0 cm  H20           95th% pressure 16.2 cm  H20     Mask type:  Nasal Mask    Objective measures: 14 day rolling measures      Compliance  71 %      Leak  5.92 lpm  last  upload      AHI 9.61   last  Upload centrals and obstructive events       Average number of minutes 318      Objective measure goal  Compliance   Goal >70%  Leak   Goal < 24 lpm  AHI  Goal < 5  Usage  Goal >240        Total time spent on accessing and interpreting remote patient PAP therapy data  10 minutes    Total time spent counseling, coaching  and reviewing PAP therapy data with patient  0 minutes

## 2020-07-07 ENCOUNTER — DOCUMENTATION ONLY (OUTPATIENT)
Dept: SLEEP MEDICINE | Facility: CLINIC | Age: 50
End: 2020-07-07

## 2020-07-07 NOTE — PROGRESS NOTES
PT RETURNED CPAP MACHINE DUE TO NON- COMPLIANCE TO [facility] IN [facility].

## 2020-08-13 ENCOUNTER — TELEPHONE (OUTPATIENT)
Dept: SURGERY | Facility: CLINIC | Age: 50
End: 2020-08-13

## 2020-08-13 NOTE — TELEPHONE ENCOUNTER
Last Clinic Visit: 10/2/2019  [facility]    No scheduled appointment.    Message left for patient that refill can not be done as there is no scheduled appointment. Scheduling number was left on the voice mail.      Kathleen M Doege RN

## 2020-08-13 NOTE — TELEPHONE ENCOUNTER
Reason for call:  Medication   If this is a refill request, has the caller requested the refill from the pharmacy already? Yes  Will the patient be using a [facility]? No  Name of the pharmacy and phone number for the current request: [facility]    Name of the medication requested: ozempic    Other request:     Phone number to reach patient:  Home number on file [phone] (home)    Best Time:  anytime    Can we leave a detailed message on this number?  YES    Travel screening: Not Applicable

## 2021-02-11 ENCOUNTER — TELEPHONE (OUTPATIENT)
Dept: INTERNAL MEDICINE | Facility: CLINIC | Age: 51
End: 2021-02-11

## 2021-02-11 NOTE — TELEPHONE ENCOUNTER
[facility] Call Center    Phone Message    May a detailed message be left on voicemail: yes     Reason for Call: Other: Following up on fax for medication approval that was sent 2 weeks ago. Elda will be sending another fax today. Please fax it back ASAP. Please call Elda to let her know if the clinic did recieve it or not. Thank you.      Action Taken: Message routed to:  [facility]    Travel Screening: Not Applicable

## 2021-02-12 NOTE — TELEPHONE ENCOUNTER
Spoke to Elda and advised her that we have not received any form from her pharmacy.  She will refax again today 2/12/21.    Sheba Camarillo on 2/12/2021 at 11:34 AM

## 2021-07-09 ENCOUNTER — OFFICE VISIT (OUTPATIENT)
Dept: INTERNAL MEDICINE | Facility: CLINIC | Age: 51
End: 2021-07-09
Payer: COMMERCIAL

## 2021-07-09 VITALS
WEIGHT: 266.4 LBS | SYSTOLIC BLOOD PRESSURE: 111 MMHG | HEART RATE: 60 BPM | BODY MASS INDEX: 39.34 KG/M2 | TEMPERATURE: 97.9 F | DIASTOLIC BLOOD PRESSURE: 75 MMHG

## 2021-07-09 DIAGNOSIS — M54.2 NECK PAIN: ICD-10-CM

## 2021-07-09 DIAGNOSIS — Z12.11 SPECIAL SCREENING FOR MALIGNANT NEOPLASMS, COLON: ICD-10-CM

## 2021-07-09 DIAGNOSIS — Z11.4 SCREENING FOR HUMAN IMMUNODEFICIENCY VIRUS WITHOUT PRESENCE OF RISK FACTORS: ICD-10-CM

## 2021-07-09 DIAGNOSIS — Z11.59 ENCOUNTER FOR HEPATITIS C SCREENING TEST FOR LOW RISK PATIENT: ICD-10-CM

## 2021-07-09 DIAGNOSIS — E55.9 VITAMIN D DEFICIENCY: ICD-10-CM

## 2021-07-09 DIAGNOSIS — M53.3 SACROILIAC JOINT DYSFUNCTION OF RIGHT SIDE: ICD-10-CM

## 2021-07-09 DIAGNOSIS — Z13.220 SCREENING FOR HYPERLIPIDEMIA: ICD-10-CM

## 2021-07-09 DIAGNOSIS — Z00.00 ROUTINE HISTORY AND PHYSICAL EXAMINATION OF ADULT: Primary | ICD-10-CM

## 2021-07-09 DIAGNOSIS — M10.9 GOUT, ARTHROPATHY: ICD-10-CM

## 2021-07-09 DIAGNOSIS — M54.12 CERVICAL RADICULOPATHY: ICD-10-CM

## 2021-07-09 DIAGNOSIS — E11.9 TYPE 2 DIABETES MELLITUS WITHOUT COMPLICATION, WITHOUT LONG-TERM CURRENT USE OF INSULIN (H): ICD-10-CM

## 2021-07-09 DIAGNOSIS — G47.33 OSA (OBSTRUCTIVE SLEEP APNEA): ICD-10-CM

## 2021-07-09 DIAGNOSIS — J41.0 SIMPLE CHRONIC BRONCHITIS (H): ICD-10-CM

## 2021-07-09 DIAGNOSIS — G89.29 CHRONIC BILATERAL LOW BACK PAIN WITH RIGHT-SIDED SCIATICA: ICD-10-CM

## 2021-07-09 DIAGNOSIS — M54.41 CHRONIC BILATERAL LOW BACK PAIN WITH RIGHT-SIDED SCIATICA: ICD-10-CM

## 2021-07-09 PROCEDURE — 99396 PREV VISIT EST AGE 40-64: CPT | Performed by: NURSE PRACTITIONER

## 2021-07-09 RX ORDER — CYCLOBENZAPRINE HCL 10 MG
10 TABLET ORAL 3 TIMES DAILY PRN
Qty: 20 TABLET | Refills: 0 | Status: CANCELLED | OUTPATIENT
Start: 2021-07-09

## 2021-07-09 RX ORDER — NAPROXEN 500 MG/1
500 TABLET ORAL 2 TIMES DAILY PRN
Qty: 30 TABLET | Refills: 1 | Status: SHIPPED | OUTPATIENT
Start: 2021-07-09

## 2021-07-09 RX ORDER — GLUCOSAMINE HCL 500 MG
1 TABLET ORAL DAILY
Qty: 90 TABLET | Refills: 3 | Status: SHIPPED | OUTPATIENT
Start: 2021-07-09

## 2021-07-09 RX ORDER — TIZANIDINE HYDROCHLORIDE 4 MG/1
4 CAPSULE, GELATIN COATED ORAL 2 TIMES DAILY PRN
Qty: 30 CAPSULE | Refills: 1 | Status: SHIPPED | OUTPATIENT
Start: 2021-07-09

## 2021-07-09 RX ORDER — IBUPROFEN 600 MG/1
600 TABLET, FILM COATED ORAL EVERY 6 HOURS PRN
Qty: 120 TABLET | Refills: 1 | Status: SHIPPED | OUTPATIENT
Start: 2021-07-09 | End: 2023-01-19

## 2021-07-09 RX ORDER — ALBUTEROL SULFATE 1.25 MG/3ML
1.25 SOLUTION RESPIRATORY (INHALATION) EVERY 6 HOURS PRN
Qty: 60 ML | Refills: 2 | Status: SHIPPED | OUTPATIENT
Start: 2021-07-09 | End: 2021-07-09 | Stop reason: DRUGHIGH

## 2021-07-09 RX ORDER — ALBUTEROL SULFATE 0.83 MG/ML
2.5 SOLUTION RESPIRATORY (INHALATION) EVERY 6 HOURS PRN
Qty: 60 ML | Refills: 2 | Status: SHIPPED | OUTPATIENT
Start: 2021-07-09 | End: 2023-01-19

## 2021-07-09 RX ORDER — ALLOPURINOL 100 MG/1
100 TABLET ORAL DAILY
Qty: 90 TABLET | Refills: 3 | Status: SHIPPED | OUTPATIENT
Start: 2021-07-09 | End: 2023-01-19

## 2021-07-09 RX ORDER — ALBUTEROL SULFATE 90 UG/1
2 AEROSOL, METERED RESPIRATORY (INHALATION) EVERY 6 HOURS PRN
Qty: 8.5 G | Refills: 3 | Status: SHIPPED | OUTPATIENT
Start: 2021-07-09 | End: 2023-01-19

## 2021-07-09 RX ORDER — ACETAMINOPHEN 500 MG
500-1000 TABLET ORAL EVERY 6 HOURS PRN
Qty: 90 TABLET | Refills: 1 | Status: SHIPPED | OUTPATIENT
Start: 2021-07-09 | End: 2023-01-19

## 2021-07-09 ASSESSMENT — PAIN SCALES - GENERAL: PAINLEVEL: SEVERE PAIN (7)

## 2021-07-09 NOTE — PROGRESS NOTES
SUBJECTIVE:  Bill Wisdom is a 51 year old male with pmh of   Past Medical History:   Diagnosis Date     COPD (chronic obstructive pulmonary disease) (H)      Gastro-oesophageal reflux disease      History of tobacco abuse      Obese      CHANDLER (obstructive sleep apnea)      Prediabetes      Who comes in for preventive examination today.     Medications and allergies were reviewed by me today.     Family History   Problem Relation Age of Onset     C.A.D. Mother         triple bypass     Hypertension Father      Cerebrovascular Disease Father      Alcohol/Drug Sister        Social History     Tobacco Use     Smoking status: Former Smoker     Packs/day: 0.00     Types: Cigarettes     Quit date: 2019     Years since quittin.4     Smokeless tobacco: Never Used   Substance Use Topics     Alcohol use: No     Comment: quit drinking 2019     Drug use: Not Currently       He is here today for preventative care. He needs medication refills and would like to discuss health maintenance screenings indicated for him. He is due for an eye exam and colon cancer screening, would like to have those done.     He has been working on weight loss - 266 lb today. He is feeling more energy, less fatigue, knees and low back are less painful. He is sleeping better, though he has not been using a CPAP, and he is interested in resuming use. Insurance will not cover right now because he does not wear long enough to meet coverage criteria. He plans to buy a unit out of pocket, needs orders/referral for adjustments.     Mental Health - depression and anxiety, manages reading, walking, meditation. Works with [employer], has nurse and psychiatrist and psychologist.     Social/Lifestyle - He is not currently working, injured on the job right before pandemic hit. He has started to exercise as part of weight loss regimen, plans to continue increasing activity. He is working on making healthier food choices in his diet, more whole  foods. 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 He is looking forward to reconnecting with his children and grandchildren.     Review Of Systems  Constitutional: no fevers, chills, night sweats or unintentional weight change   Eyes: no vision change, diplopia or red eyes   Ears, Nose, Mouth, Throat: no tinnitus or hearing change, no epistaxis or nasal discharge, no oral lesions, throat clear   Cardiovascular: no chest pain, palpitations, or pain with walking, no orthopnea or PND   Respiratory: no dyspnea, cough, shortness of breath or wheezing   GI: no nausea, vomiting, diarrhea or constipation, no abdominal pain   : no change in urine, no dysuria or hematuria, no sexual dysfunction   Musculoskeletal: no joint or muscle pain or swelling   Integumentary: no concerning lesions or moles   Neuro: no loss of strength or sensation, no numbness or tingling, no tremor, no dizziness, no headache   Endo: no polyuria or polydipsia, no temperature intolerance   Heme/Lymph: no concerning bumps, no bleeding problems   Allergy: no environmental allergies   Psych: +depression and +anxiety, currently feels well-managed      OBJECTIVE:  /75 (BP Location: Right arm, Patient Position: Sitting, Cuff Size: Adult Large)   Pulse 60   Temp 97.9  F (36.6  C) (Oral)   Wt 120.8 kg (266 lb 6.4 oz)   BMI 39.34 kg/m      GENERAL APPEARANCE: healthy, alert and no distress  EYES: Eyes grossly normal to inspection, conjunctivae and sclerae normal and lids and lashes normal  HENT: nose and mouth without ulcers or lesions, TM's pearly grey and bilateral canals non-occluded, oral mucous membranes moist  NECK: no adenopathy, no asymmetry, masses, or scars and thyroid normal to palpation  RESP: lungs clear to auscultation - no rales, rhonchi or wheezes and breathing unlabored   CV: regular rates and rhythm, normal S1 S2, no S3 or S4, no  murmur, click or rub and peripheral pulses strong  LYMPHATICS: no cervical or supraclavicular adenopathy  GI: bowel sounds normal, no hepatosplenomegaly or masses and soft, non-tender  MS: extremities normal- no gross deformities noted, peripheral pulses normal and normal range of motion   SKIN: no suspicious lesions or rashes   NEURO: Normal strength and tone, mentation intact, speech normal  PSYCH: mentation appears normal, affect normal/bright and engaged     Diabetic foot exam: normal DP and PT pulses, no trophic changes or ulcerative lesions, normal sensory exam and normal monofilament exam      ASSESSMENT/PLAN:  Pt is a 51 year old male here for preventive examination    1. Cervical radiculopathy  2. Neck pain  3. Chronic bilateral low back pain with right-sided sciatica  4. Sacroiliac joint dysfunction of right side  5. Gout, arthropathy  Neck and low back pain well managed with medications at this time - will refill naproxen, ibuprofen, acetaminophen, and tizanidine. Counseled to alternating use of NSAIDs (do not use ibuprofen and naproxen at same time), continue weight loss, and increase exercise as tolerated. Gout symptoms stable, will refill allopurinol.   - naproxen (NAPROSYN) 500 MG tablet; Take 1 tablet (500 mg) by mouth 2 times daily as needed for moderate pain  Dispense: 30 tablet; Refill: 1  - ibuprofen (ADVIL/MOTRIN) 600 MG tablet; Take 1 tablet (600 mg) by mouth every 6 hours as needed for moderate pain  Dispense: 120 tablet; Refill: 1  - acetaminophen (TYLENOL) 500 MG tablet; Take 1-2 tablets (500-1,000 mg) by mouth every 6 hours as needed for mild pain or fever  Dispense: 90 tablet; Refill: 1  - tiZANidine (ZANAFLEX) 4 MG capsule; Take 1 capsule (4 mg) by mouth 2 times daily as needed for muscle spasms  Dispense: 30 capsule; Refill: 1  - allopurinol (ZYLOPRIM) 100 MG tablet; Take 1 tablet (100 mg) by mouth daily  Dispense: 90 tablet; Refill: 3    7. Simple chronic bronchitis (H)  Asthma  adequately controlled at this time. Uses nebulizer PRN, 2-3 x month. Rescue inhaler use only when he is away from home, 2 x month. Will provide medication refills today.   - albuterol (PROVENTIL HFA) 108 (90 Base) MCG/ACT inhaler; Inhale 2 puffs into the lungs every 6 hours as needed for shortness of breath / dyspnea or wheezing  Dispense: 8.5 g; Refill: 3  - albuterol (PROVENTIL) (2.5 MG/3ML) 0.083% neb solution; Take 1 vial (2.5 mg) by nebulization every 6 hours as needed for shortness of breath / dyspnea or wheezing  Dispense: 60 mL; Refill: 2    11. Routine history and physical examination of adult   12. Screening for hyperlipidemia  14. Special screening for malignant neoplasms, colon  8. Encounter for hepatitis C screening test for low risk patient  10. Type 2 diabetes mellitus without complication, without long-term current use of insulin (H)  9. Screening for human immunodeficiency virus without presence of risk factors  6. Vitamin D deficiency  History, medications, allergies, and immunizations reviewed. Counseled on healthy lifestyle - aim for 150 minutes of exercise per week and diet including lean proteins, fruits and vegetables. Discussed health maintenance and preventative care, he is agreeable to screenings indicated for him. Recommend vitamin d level check and will refill medication.   - Comprehensive metabolic panel; Future  - CBC with platelets; Future  - Lipid panel reflex to direct LDL Fasting; Future  - GASTROENTEROLOGY ADULT REF PROCEDURE ONLY; Future  - HCV Screen with Reflex; Future  - HIV Antigen Antibody Combo; Future  - Hemoglobin A1c; Future  - EYE ADULT REFERRAL; Future  - Albumin Random Urine Quantitative with Creat Ratio; Future  - Cholecalciferol (VITAMIN D3) 75 MCG (3000 UT) TABS; Take 1 tablet by mouth daily  Dispense: 90 tablet; Refill: 3  - Vitamin D Deficiency; Future    13. CHANDLER (obstructive sleep apnea)  Discussed CPAP use for CHANDLER. He will need to work with sleep center to  "resume treatment and for ongoing management of device settings. Will place referral today.   - SLEEP EVALUATION & MANAGEMENT REFERRAL - ADULT -[facility] [phone] (Age 15 and up); Future      FOLLOW UP: 4-6 months for chronic care management     Colorectal screening indicated and ordered on 7/9/21  Osteoporosis screening not indicated.  Deferred  Immunizations reviewed and updated in EPIC  Labs ordered CBC, Vit D, Lipid Panel, HbA1C, Urine microalbumin/creatinine, CMP, Hepatitis C and HIV    I, Ellen Nicholas am a nurse practitioner student working under the guidance of Amalia PACHECO CNP.     Ellen Nicholas, RN, BSN, DNP Student    I, JUNIOR Cabello CNP, saw and examined this patient with the NP student and agree with the student's findings and plan of care as documented in the student's note with the following modifications: none\"    JUNIOR Cabello CNP      "

## 2021-07-09 NOTE — NURSING NOTE
Chief Complaint   Patient presents with     Physical     Refill Request     naproxen, ibuprofen, tylenol        Mik Sotelo CMA (SERJIO) at 10:40 AM on 7/9/2021

## 2021-09-15 ENCOUNTER — ANCILLARY PROCEDURE (OUTPATIENT)
Dept: GENERAL RADIOLOGY | Facility: CLINIC | Age: 51
End: 2021-09-15
Attending: FAMILY MEDICINE
Payer: COMMERCIAL

## 2021-09-15 ENCOUNTER — OFFICE VISIT (OUTPATIENT)
Dept: ORTHOPEDICS | Facility: CLINIC | Age: 51
End: 2021-09-15
Payer: COMMERCIAL

## 2021-09-15 VITALS
BODY MASS INDEX: 39.1 KG/M2 | WEIGHT: 264 LBS | SYSTOLIC BLOOD PRESSURE: 120 MMHG | HEIGHT: 69 IN | DIASTOLIC BLOOD PRESSURE: 77 MMHG

## 2021-09-15 DIAGNOSIS — S99.922A INJURY OF LEFT GREAT TOE, INITIAL ENCOUNTER: Primary | ICD-10-CM

## 2021-09-15 DIAGNOSIS — S99.922A INJURY OF LEFT GREAT TOE, INITIAL ENCOUNTER: ICD-10-CM

## 2021-09-15 PROCEDURE — 99204 OFFICE O/P NEW MOD 45 MIN: CPT | Performed by: FAMILY MEDICINE

## 2021-09-15 PROCEDURE — 73660 X-RAY EXAM OF TOE(S): CPT | Mod: LT | Performed by: RADIOLOGY

## 2021-09-15 RX ORDER — ACETAMINOPHEN 500 MG
500-1000 TABLET ORAL EVERY 8 HOURS PRN
Qty: 60 TABLET | Refills: 1 | Status: SHIPPED | OUTPATIENT
Start: 2021-09-15 | End: 2023-01-19

## 2021-09-15 ASSESSMENT — MIFFLIN-ST. JEOR: SCORE: 2032.88

## 2021-09-15 NOTE — Clinical Note
Super nice bob, will follow up with you in 2 weeks as he live in [facility].  Should be no surprises, follow up is needed though as this is a claim related to injury [place] bus station.  Felt great in a dynaflex insert, no obvious fracture, and low clinical suspicion.  Let me know if any questions arise!    Dustin Mcclellan DO, CAMYLES  Sports Medicine Physician  [facility]

## 2021-09-15 NOTE — PROGRESS NOTES
"Bill Wisdom  :  1970  DOS: 9/15/2021  MRN: [id number]    Sports Medicine Clinic Visit    PCP: Amalia Hernandez    Bill Wisdom is a 51 year old male who is seen as an AIC patient presenting with left great toe injury.    Injury: [place], \"stubbed\" big toe on piece of rebar ~ 10 days ago (21).  Pain located over left great toe, proximal phalanx, nonradiating.  Additional Features:  Positive: swelling and bruising.  Symptoms are better with Rest.  Symptoms are worse with: walking, direct pressure.  Other evaluation and/or treatments so far consists of: Tylenol, Rest and Epsom Salt soaks.  Prior imaging: No recent imaging completed.  Prior History of related problems: none    Social History: on long term disability    Review of Systems  Musculoskeletal: as above  Remainder of review of systems is negative including constitutional, CV, pulmonary, GI, Skin and Neurologic except as noted in HPI or medical history.    Past Medical History:   Diagnosis Date     COPD (chronic obstructive pulmonary disease) (H)      Gastro-oesophageal reflux disease      History of tobacco abuse      Obese      CHANDLER (obstructive sleep apnea)      Prediabetes      Past Surgical History:   Procedure Laterality Date     HYDROCELECTOMY SCROTAL  2006     INJECT SACROILIAC JOINT Right 2019    Procedure: Right Sacroiliac Joint Injection;  Surgeon: Sina Laboy MD;  Location: [facility] OR     SPERMATOCELECTOMY  3/20/2012    Procedure:SPERMATOCELECTOMY; Left Spermatocelectomy; Surgeon:DELLA CROW; Location: OR     Family History   Problem Relation Age of Onset     C.A.D. Mother         triple bypass     Hypertension Father      Cerebrovascular Disease Father      Alcohol/Drug Sister        Objective  /77   Ht 1.753 m (5' 9\")   Wt 119.7 kg (264 lb)   BMI 38.99 kg/m        General: healthy, alert and in no distress      HEENT: no scleral icterus or conjunctival erythema "     Skin: no suspicious lesions or rash. No jaundice.     CV: regular rhythm by palpation, 2+ distal pulses, no pedal edema      Resp: normal respiratory effort without conversational dyspnea     Psych: normal mood and affect      Gait: antalgic, appropriate coordination and balance     Neuro: normal light touch sensory exam of the extremities. Motor strength as noted below     Left Ankle/Foot Exam:    Inspection:       no visible ecchymosis       edema noted mild over 1st MTP and IP joints       Normal DP artery pulse       Normal PT artery pulse    Foot inspection:       no deformity noted    ROM:        full ROM with dorsiflexion, plantarflexion, inversion and eversion      Mildly limited 1st MTP and IP joint flexion and extension due to pain    Tender:       TTP over the distal 1st MT, 1st MTP joint and 1st IP joint    Non-Tender:       remainder of foot and ankle    Skin:       well perfused       capillary refill less than 2 seconds    Special Tests:       Neg laxity of collateral ligaments with stress testing of 1st MTP and IP joints    Gait:       antalgic gait       using assitive device       cane    Proprioception:        deferred    Radiology:  Recent Results (from the past 744 hour(s))   XR Toe LT G/E 2 vw    Narrative    XR TOE LEFT G/E 2 VIEWS 9/15/2021 11:25 AM     HISTORY: Injury of left great toe, initial encounter      Impression    IMPRESSION: Negative exam.    SIXTO MARSHALL MD         SYSTEM ID:  [id number]       Assessment:  1. Injury of left great toe, initial encounter        Plan:  Discussed the assessment with the patient.  Follow up: 2 weeks with [place] Khan [place] the [facility] [place] for PCP and most specialties  WBAT, progress as tolerated  XR images independently visualized and reviewed with patient today in clinic  Reassuring imaging, no obvious fracture present  Small ossification medial aspect of IP joint, not exquisitely tender at this location, no bruising, likely a chronic  finding  Dynaflex insert provided for shoe, significant relief with WB when tested  RICE reviewed  Rx provided for tylenol, prn only, up to 3000mg per day  We discussed modified progressive pain-free activity as tolerated  Supportive care reviewed  All questions were answered today  Contact us with additional questions or concerns  Signs and sx of concern reviewed      Dustin Mcclellan DO, FRANCESCA  Sports Medicine Physician  [facility] Orthoped[facility] and Sports Medicine

## 2021-09-15 NOTE — LETTER
"    9/15/2021         RE: Bill Wisdom  [address]  [place] [address]        Dear Colleague,    Thank you for referring your patient, Bill Wisdom, to the [facility]. Please see a copy of my visit note below.    Bill Wisdom  :  1970  DOS: 9/15/2021  MRN: [id number]    Sports Medicine Clinic Visit    PCP: Amalia Hernandez    Bill Wisdom is a 51 year old male who is seen as an AIC patient presenting with left great toe injury.    [place]: [place] \"[place]bed\" big toe on piece of rebar ~ 10 days ago (21).  Pain located over left great toe, proximal phalanx, nonradiating.  Additional Features:  Positive: swelling and bruising.  Symptoms are better with Rest.  Symptoms are worse with: walking, direct pressure.  Other evaluation and/or treatments so far consists of: Tylenol, Rest and Epsom Salt soaks.  Prior imaging: No recent imaging completed.  Prior History of related problems: none    Social History: on long term disability    Review of Systems  Musculoskeletal: as above  Remainder of review of systems is negative including constitutional, CV, pulmonary, GI, Skin and Neurologic except as noted in HPI or medical history.    Past Medical History:   Diagnosis Date     COPD (chronic obstructive pulmonary disease) (H)      Gastro-oesophageal reflux disease      History of tobacco abuse      Obese      CHANDLER (obstructive sleep apnea)      Prediabetes      Past Surgical History:   Procedure Laterality Date     HYDROCELECTOMY SCROTAL       INJECT SACROILIAC JOINT Right 2019    Procedure: Right Sacroiliac Joint Injection;  Surgeon: Sina Laboy MD;  Location: [facility]     SPERMATOCELECTOMY  3/20/2012    Procedure:SPERMATOCELECTOMY; Left Spermatocelectomy; Surgeon:DELLA CROW; Location:[facility] OR     Family History   Problem Relation Age of Onset     C.A.D. Mother         triple bypass     Hypertension Father      " "Cerebrovascular Disease Father      Alcohol/Drug Sister        Objective  /77   Ht 1.753 m (5' 9\")   Wt 119.7 kg (264 lb)   BMI 38.99 kg/m        General: healthy, alert and in no distress      HEENT: no scleral icterus or conjunctival erythema     Skin: no suspicious lesions or rash. No jaundice.     CV: regular rhythm by palpation, 2+ distal pulses, no pedal edema      Resp: normal respiratory effort without conversational dyspnea     Psych: normal mood and affect      Gait: antalgic, appropriate coordination and balance     Neuro: normal light touch sensory exam of the extremities. Motor strength as noted below     Left Ankle/Foot Exam:    Inspection:       no visible ecchymosis       edema noted mild over 1st MTP and IP joints       Normal DP artery pulse       Normal PT artery pulse    Foot inspection:       no deformity noted    ROM:        full ROM with dorsiflexion, plantarflexion, inversion and eversion      Mildly limited 1st MTP and IP joint flexion and extension due to pain    Tender:       TTP over the distal 1st MT, 1st MTP joint and 1st IP joint    Non-Tender:       remainder of foot and ankle    Skin:       well perfused       capillary refill less than 2 seconds    Special Tests:       Neg laxity of collateral ligaments with stress testing of 1st MTP and IP joints    Gait:       antalgic gait       using assitive device       cane    Proprioception:        deferred    Radiology:  Recent Results (from the past 744 hour(s))   XR Toe LT G/E 2 vw    Narrative    XR TOE LEFT G/E 2 VIEWS 9/15/2021 11:25 AM     HISTORY: Injury of left great toe, initial encounter      Impression    IMPRESSION: Negative exam.    SIXTO MARSHALL MD         SYSTEM ID:  [id number]       Assessment:  1. Injury of left great toe, initial encounter        Plan:  Discussed the assessment with the patient.  Follow up: 2 weeks with [place] Khan [place] [facility] [place] for PCP and most specialties  WBAT, progress as " tolerated  XR images independently visualized and reviewed with patient today in clinic  Reassuring imaging, no obvious fracture present  Small ossification medial aspect of IP joint, not exquisitely tender at this location, no bruising, likely a chronic finding  Dynaflex insert provided for shoe, significant relief with WB when tested  RICE reviewed  Rx provided for tylenol, prn only, up to 3000mg per day  We discussed modified progressive pain-free activity as tolerated  Supportive care reviewed  All questions were answered today  Contact us with additional questions or concerns  Signs and sx of concern reviewed      Dustin Mcclellan DO, FRANCESCA  Sports Medicine Physician  [facility] Orthoped[facility] and Sports Medicine                  Again, thank you for allowing me to participate in the care of your patient.        Sincerely,        Dustin Mcclellan DO

## 2021-12-27 ENCOUNTER — TRANSFERRED RECORDS (OUTPATIENT)
Dept: HEALTH INFORMATION MANAGEMENT | Facility: CLINIC | Age: 51
End: 2021-12-27

## 2021-12-27 ENCOUNTER — OFFICE VISIT (OUTPATIENT)
Dept: URGENT CARE | Facility: URGENT CARE | Age: 51
End: 2021-12-27
Payer: COMMERCIAL

## 2021-12-27 VITALS
TEMPERATURE: 98.5 F | WEIGHT: 256 LBS | DIASTOLIC BLOOD PRESSURE: 85 MMHG | BODY MASS INDEX: 37.8 KG/M2 | OXYGEN SATURATION: 98 % | HEART RATE: 76 BPM | SYSTOLIC BLOOD PRESSURE: 137 MMHG

## 2021-12-27 DIAGNOSIS — E11.69 TYPE 2 DIABETES MELLITUS WITH OTHER SPECIFIED COMPLICATION, WITHOUT LONG-TERM CURRENT USE OF INSULIN (H): ICD-10-CM

## 2021-12-27 DIAGNOSIS — R07.0 THROAT PAIN: Primary | ICD-10-CM

## 2021-12-27 DIAGNOSIS — J36 PERITONSILLAR ABSCESS: ICD-10-CM

## 2021-12-27 LAB
DEPRECATED S PYO AG THROAT QL EIA: POSITIVE
SARS-COV-2 RNA RESP QL NAA+PROBE: NEGATIVE

## 2021-12-27 PROCEDURE — U0005 INFEC AGEN DETEC AMPLI PROBE: HCPCS | Performed by: PHYSICIAN ASSISTANT

## 2021-12-27 PROCEDURE — 87880 STREP A ASSAY W/OPTIC: CPT | Performed by: PHYSICIAN ASSISTANT

## 2021-12-27 PROCEDURE — U0003 INFECTIOUS AGENT DETECTION BY NUCLEIC ACID (DNA OR RNA); SEVERE ACUTE RESPIRATORY SYNDROME CORONAVIRUS 2 (SARS-COV-2) (CORONAVIRUS DISEASE [COVID-19]), AMPLIFIED PROBE TECHNIQUE, MAKING USE OF HIGH THROUGHPUT TECHNOLOGIES AS DESCRIBED BY CMS-2020-01-R: HCPCS | Performed by: PHYSICIAN ASSISTANT

## 2021-12-27 PROCEDURE — 99214 OFFICE O/P EST MOD 30 MIN: CPT | Performed by: PHYSICIAN ASSISTANT

## 2021-12-27 NOTE — PROGRESS NOTES
"  Assessment & Plan     Throat pain  Strep positive  - Streptococcus A Rapid Scr w Reflx to PCR - Lab Collect  - Symptomatic; Unknown COVID-19 Virus (Coronavirus) by PCR Nose    Type 2 diabetes mellitus with other specified complication, without long-term current use of insulin (H)  Monitor blood sugars    Peritonsillar abscess  Left side peritonsillar abscess  Referred today to ENT for I&D and treatment      Review of external notes as documented elsewhere in note  30 minutes spent on the date of the encounter doing chart review, review of outside records, review of test results, interpretation of tests, patient visit and documentation        BMI:   Estimated body mass index is 37.8 kg/m  as calculated from the following:    Height as of 9/15/21: 1.753 m (5' 9\").    Weight as of this encounter: 116.1 kg (256 lb).       CONSULTATION/REFERRAL to ENT    No follow-ups on file.    Maximilian Caceres PA-C  [facility] BRYANT Khan is a 51 year old who presents for the following health issues     HPI     Left side throat pain  Throat swelling  Lymph node swelling    Review of Systems   Constitutional, HEENT, cardiovascular, pulmonary, gi and gu systems are negative, except as otherwise noted.      Objective    /85 (BP Location: Right arm, Patient Position: Sitting, Cuff Size: Adult Regular)   Pulse 76   Temp 98.5  F (36.9  C) (Oral)   Wt 116.1 kg (256 lb)   SpO2 98%   BMI 37.80 kg/m    Body mass index is 37.8 kg/m .  Physical Exam   GENERAL: healthy, alert and no distress  HENT: normal cephalic/atraumatic, ear canals and TM's normal, tonsillar hypertrophy, tonsillar erythema and peritonsillar abscess left sdie  NECK: Positive for swelling left side of throat  RESP: lungs clear to auscultation - no rales, rhonchi or wheezes  CV: regular rate and rhythm, normal S1 S2, no S3 or S4, no murmur, click or rub, no peripheral edema and peripheral pulses strong  ABDOMEN: soft, nontender, " no hepatosplenomegaly, no masses and bowel sounds normal  MS: no gross musculoskeletal defects noted, no edema  SKIN: no suspicious lesions or rashes  NEURO: Normal strength and tone, mentation intact and speech normal    Results for orders placed or performed in visit on 12/27/21   Streptococcus A Rapid Scr w Reflx to PCR - Lab Collect     Status: Abnormal    Specimen: Throat; Swab   Result Value Ref Range    Group A Strep antigen Positive (A) Negative

## 2022-02-09 ENCOUNTER — TELEPHONE (OUTPATIENT)
Dept: ORTHOPEDICS | Facility: CLINIC | Age: 52
End: 2022-02-09
Payer: COMMERCIAL

## 2022-02-09 NOTE — TELEPHONE ENCOUNTER
Spoke to patient discussed that his request would need to be completed through our Medical records department.  Contact information was provided.    Andres Perera ATC

## 2022-02-09 NOTE — TELEPHONE ENCOUNTER
Patient LVM requesting a call back to discuss getting his office notes from his appointment regarding his toe.     Please call #[phone]

## 2022-02-14 ENCOUNTER — HOSPITAL ENCOUNTER (EMERGENCY)
Facility: CLINIC | Age: 52
Discharge: HOME | End: 2022-02-14
Attending: EMERGENCY MEDICINE | Admitting: EMERGENCY MEDICINE
Payer: COMMERCIAL

## 2022-02-14 ENCOUNTER — APPOINTMENT (OUTPATIENT)
Dept: CT IMAGING | Facility: CLINIC | Age: 52
End: 2022-02-14
Attending: EMERGENCY MEDICINE
Payer: COMMERCIAL

## 2022-02-14 VITALS
BODY MASS INDEX: 40.31 KG/M2 | WEIGHT: 250.8 LBS | HEART RATE: 70 BPM | TEMPERATURE: 98 F | HEIGHT: 66 IN | SYSTOLIC BLOOD PRESSURE: 148 MMHG | RESPIRATION RATE: 16 BRPM | OXYGEN SATURATION: 98 % | DIASTOLIC BLOOD PRESSURE: 103 MMHG

## 2022-02-14 DIAGNOSIS — S09.90XA CLOSED HEAD INJURY, INITIAL ENCOUNTER: ICD-10-CM

## 2022-02-14 DIAGNOSIS — S16.1XXA STRAIN OF NECK MUSCLE, INITIAL ENCOUNTER: ICD-10-CM

## 2022-02-14 PROCEDURE — 250N000013 HC RX MED GY IP 250 OP 250 PS 637: Performed by: EMERGENCY MEDICINE

## 2022-02-14 PROCEDURE — 72125 CT NECK SPINE W/O DYE: CPT

## 2022-02-14 PROCEDURE — 99285 EMERGENCY DEPT VISIT HI MDM: CPT | Mod: 25 | Performed by: EMERGENCY MEDICINE

## 2022-02-14 PROCEDURE — 96374 THER/PROPH/DIAG INJ IV PUSH: CPT | Performed by: EMERGENCY MEDICINE

## 2022-02-14 PROCEDURE — 258N000003 HC RX IP 258 OP 636: Performed by: EMERGENCY MEDICINE

## 2022-02-14 PROCEDURE — 99285 EMERGENCY DEPT VISIT HI MDM: CPT | Performed by: EMERGENCY MEDICINE

## 2022-02-14 PROCEDURE — 70450 CT HEAD/BRAIN W/O DYE: CPT

## 2022-02-14 PROCEDURE — 96361 HYDRATE IV INFUSION ADD-ON: CPT | Performed by: EMERGENCY MEDICINE

## 2022-02-14 PROCEDURE — 250N000011 HC RX IP 250 OP 636: Performed by: EMERGENCY MEDICINE

## 2022-02-14 RX ORDER — CYCLOBENZAPRINE HCL 10 MG
10 TABLET ORAL 3 TIMES DAILY PRN
Qty: 20 TABLET | Refills: 0 | Status: SHIPPED | OUTPATIENT
Start: 2022-02-14 | End: 2022-02-14

## 2022-02-14 RX ORDER — SODIUM CHLORIDE 9 MG/ML
INJECTION, SOLUTION INTRAVENOUS CONTINUOUS
Status: DISCONTINUED | OUTPATIENT
Start: 2022-02-14 | End: 2022-02-14 | Stop reason: HOSPADM

## 2022-02-14 RX ORDER — KETOROLAC TROMETHAMINE 15 MG/ML
15 INJECTION, SOLUTION INTRAMUSCULAR; INTRAVENOUS ONCE
Status: COMPLETED | OUTPATIENT
Start: 2022-02-14 | End: 2022-02-14

## 2022-02-14 RX ORDER — CYCLOBENZAPRINE HCL 10 MG
10 TABLET ORAL 3 TIMES DAILY PRN
Qty: 20 TABLET | Refills: 0 | Status: SHIPPED | OUTPATIENT
Start: 2022-02-14

## 2022-02-14 RX ORDER — CYCLOBENZAPRINE HCL 10 MG
10 TABLET ORAL ONCE
Status: COMPLETED | OUTPATIENT
Start: 2022-02-14 | End: 2022-02-14

## 2022-02-14 RX ADMIN — CYCLOBENZAPRINE 10 MG: 10 TABLET, FILM COATED ORAL at 15:21

## 2022-02-14 RX ADMIN — SODIUM CHLORIDE 1000 ML: 9 INJECTION, SOLUTION INTRAVENOUS at 11:24

## 2022-02-14 RX ADMIN — KETOROLAC TROMETHAMINE 15 MG: 15 INJECTION, SOLUTION INTRAMUSCULAR; INTRAVENOUS at 15:21

## 2022-02-14 ASSESSMENT — MIFFLIN-ST. JEOR: SCORE: 1925.37

## 2022-02-14 NOTE — ED NOTES
Bed: ED03  Expected date: 2/14/22  Expected time:   Means of arrival:   Comments:  Hold for blood transfusion when clean

## 2022-02-14 NOTE — DISCHARGE INSTRUCTIONS
Please make an appointment to follow up with Your Primary Care Provider as soon as possible. They can refer you to the concussion clinic/Physical therapy if you are not improving.    Can use flexeril as needed for muscle spasm. Can take motrin and tylenol for pain.     Return to the ED if you develop worsening severe pain, vomiting, confusion, numbness, tingling, weakness, or any new or worsening concerns.

## 2022-02-16 NOTE — ED PROVIDER NOTES
ED Provider Note  [facility]      History     Chief Complaint   Patient presents with     Head Injury     Pt states he slipped on the ice and felt near syncopy.  States this happened @ 1630 on Thrursday.  Pt states he hit the back of his head and has not been feeling right.  Denies nause or vomiting.       HPI  Bill Wisdom is a 51 year old male with history of COPD, chronic back and neck pain who presents for evaluation after a fall 4 days ago. Patient reports he was coming out of his apartment and slipped on some ice and hit the back of his head/neck. He states he was somewhat dazed but does not think he fully lost consciousness. He reports he had pain in the back of his head and the front of his head and pain down his neck. He reports the neck pain radiates towards his shoulders. He reports some intermittent tingling in his hands but this is not constant. It is not occurring currently. He denies any weakness. No nausea or vomiting. No change in his vision. He reports feeling slightly dizzy and foggy. No chest pain, shortness of breath, fever, cough, abdominal pain, extremity pain, new back pain, numbness, or other complaints. He states his daughter convinced him to come get evaluated today. He denies being on any anticoagulation. He has not taken anything for pain today.      Past Medical History  Past Medical History:   Diagnosis Date     COPD (chronic obstructive pulmonary disease) (H)      Gastro-oesophageal reflux disease      History of tobacco abuse      Obese      CHANDLER (obstructive sleep apnea)      Prediabetes      Past Surgical History:   Procedure Laterality Date     HYDROCELECTOMY SCROTAL  2006     INJECT SACROILIAC JOINT Right 9/16/2019    Procedure: Right Sacroiliac Joint Injection;  Surgeon: Sina Laboy MD;  Location: [facility] OR     SPERMATOCELECTOMY  3/20/2012    Procedure:SPERMATOCELECTOMY; Left Spermatocelectomy; Surgeon:DELLA CROW; Location: OR  "    acetaminophen (TYLENOL) 500 MG tablet  albuterol (PROVENTIL HFA) 108 (90 Base) MCG/ACT inhaler  albuterol (PROVENTIL) (2.5 MG/3ML) 0.083% neb solution  allopurinol (ZYLOPRIM) 100 MG tablet  Cholecalciferol (VITAMIN D3) 75 MCG (3000 UT) TABS  cyclobenzaprine (FLEXERIL) 10 MG tablet  ibuprofen (ADVIL/MOTRIN) 600 MG tablet  Multiple Vitamins-Minerals (MULTIVITAMIN ADULT) CHEW  oxyCODONE (ROXICODONE) 5 MG tablet  tiZANidine (ZANAFLEX) 4 MG capsule  topiramate (TOPAMAX) 100 MG tablet  traZODone (DESYREL) 100 MG tablet  acetaminophen (TYLENOL) 500 MG tablet  naproxen (NAPROSYN) 500 MG tablet  order for DME  order for DME      No Known Allergies  Family History  Family History   Problem Relation Age of Onset     C.A.D. Mother         triple bypass     Hypertension Father      Cerebrovascular Disease Father      Alcohol/Drug Sister      Social History   Social History     Tobacco Use     Smoking status: Former Smoker     Packs/day: 0.00     Types: Cigarettes     Quit date: 1/16/2019     Years since quitting: 3.0     Smokeless tobacco: Never Used   Substance Use Topics     Alcohol use: No     Comment: quit drinking jan 2019     Drug use: Yes     Types: Marijuana     Comment: Last used 2 days ago      Past medical history, past surgical history, medications, allergies, family history, and social history were reviewed with the patient. No additional pertinent items.       Review of Systems  A complete review of systems was performed with pertinent positives and negatives noted in the HPI, and all other systems negative.    Physical Exam   BP: 120/58  Pulse: 70  Temp: 98.3  F (36.8  C)  Resp: 18  Height: 167.6 cm (5' 6\")  Weight: 113.8 kg (250 lb 12.8 oz)  SpO2: 98 %  Physical Exam  Vitals reviewed.   Constitutional:       General: He is not in acute distress.     Appearance: He is well-developed.   HENT:      Head: Normocephalic and atraumatic.      Comments: No scalp hematoma. No hemotympanum bilaterally. No septal " hematoma bilaterally. No otorrhea or rhinorrhea. No gorman sign or raccoon eyes. No intraoral trauma.      Right Ear: Tympanic membrane normal.      Left Ear: Tympanic membrane normal.      Nose: Nose normal.      Mouth/Throat:      Mouth: Mucous membranes are moist.   Eyes:      General: No visual field deficit.     Extraocular Movements: Extraocular movements intact.      Conjunctiva/sclera: Conjunctivae normal.      Pupils: Pupils are equal, round, and reactive to light.   Neck:      Comments: No midline cervical spine tenderness. Muscular tenderness bilaterally along trapezius muscles.   Cardiovascular:      Rate and Rhythm: Normal rate and regular rhythm.      Pulses: Normal pulses.      Heart sounds: Normal heart sounds. No murmur heard.  Pulmonary:      Effort: Pulmonary effort is normal. No respiratory distress.      Breath sounds: Normal breath sounds. No stridor. No wheezing or rales.   Chest:      Chest wall: No tenderness.   Abdominal:      General: Bowel sounds are normal. There is no distension.      Palpations: Abdomen is soft. There is no mass.      Tenderness: There is no abdominal tenderness. There is no right CVA tenderness, left CVA tenderness, guarding or rebound.   Musculoskeletal:         General: No swelling or tenderness. Normal range of motion.      Cervical back: Normal range of motion and neck supple. No rigidity or tenderness.      Comments: No midline thoracic or lumbar spine tenderness. Normal range of motion of the back. Normal range of motion of all extremities and joints. No bony tenderness of the extremities or pelvis. Compartments are soft and compressible. 2+ radial and pedal pulses bilaterally.    Skin:     General: Skin is warm and dry.      Capillary Refill: Capillary refill takes less than 2 seconds.      Findings: No rash.   Neurological:      General: No focal deficit present.      Mental Status: He is alert and oriented to person, place, and time.      GCS: GCS eye  subscore is 4. GCS verbal subscore is 5. GCS motor subscore is 6.      Cranial Nerves: Cranial nerves are intact. No cranial nerve deficit, dysarthria or facial asymmetry.      Sensory: Sensation is intact. No sensory deficit.      Motor: Motor function is intact. No weakness, abnormal muscle tone or pronator drift.      Coordination: Coordination normal. Finger-Nose-Finger Test normal.      Gait: Gait normal.      Comments: 5/5 strength in all 4 extremities bilaterally including bilateral  strength, biceps, triceps, hip flexion/extension, knee flexion/extension, dorsiflexion and plantar flexion bilaterally. Sensation intact to light touch in all 4 extremities and the face bilaterally.    Psychiatric:         Mood and Affect: Mood normal.       ED Course      Procedures       The medical record was reviewed and interpreted.  Current images reviewed and interpreted: as below.  Managed outpatient prescription medications.              Results for orders placed or performed during the hospital encounter of 02/14/22   CT Head w/o Contrast     Status: None    Narrative    CT SCAN OF THE HEAD WITHOUT CONTRAST   2/14/2022 11:59 AM     HISTORY: Fall, headache.    TECHNIQUE:  Axial images of the head and coronal reformations without  IV contrast material. Radiation dose for this scan was reduced using  automated exposure control, adjustment of the mA and/or kV according  to patient size, or iterative reconstruction technique.    COMPARISON: CT head dated 10/8/2019.    FINDINGS: There is no evidence of intracranial hemorrhage, mass, acute  infarct or anomaly. The ventricles are normal in size, shape and  configuration. The brain parenchyma and subarachnoid spaces are  normal.     Symmetric proptosis of both ocular globes, unchanged. There is some  prominence of the intraorbital fat bilaterally, as before. Otherwise,  the visualized intraorbital soft tissues appear within normal limits,  accounting for limitations of  technique. The visualized aspects of the  paranasal sinuses are free of significant disease. The mastoids and  middle ear cavities are clear. The bony calvarium and bones of the  skull base appear intact.       Impression    IMPRESSION:   No evidence of acute intracranial hemorrhage, mass, or  herniation.      JUDI CAVAZOS MD         SYSTEM ID:  [id number]   Cervical spine CT w/o contrast     Status: None    Narrative    CT CERVICAL SPINE WITHOUT CONTRAST   2/14/2022 11:58 AM     HISTORY: Fall, neck pain, tingling in hands.     TECHNIQUE: Axial images of the cervical spine were obtained without  intravenous contrast. Multiplanar reformations were performed.   Radiation dose for this scan was reduced using automated exposure  control, adjustment of the mA and/or kV according to patient size, or  iterative reconstruction technique.    COMPARISON: MRI cervical spine dated 3/4/2020.    FINDINGS: No acute fracture identified. Developmentally bifid  posterior arch of C1. Straightening of the normal cervical lordosis.  Alignment otherwise appears normal. As before, mild multilevel  degenerative disc height loss, most pronounced at C5-C6. Small  marginal anterior endplate osteophytes. Bilateral C5-C6 uncovertebral  osteophytes. The facet joints appear within normal limits. Diffuse  developmental spinal canal stenosis with superimposed disc osteophyte  complexes resulting in up to moderate central spinal canal stenosis.  This overall appears similar to the prior MRI exam. Mild-to-moderate  bilateral C5-C6 neural foraminal stenosis. No other significant neural  foraminal narrowing identified. Somewhat diffuse enlargement of the  thyroid gland with multiple nodules, including a hypodense lesion in  the right thyroid lobe measuring up to 1.5 cm in the axial plane  (series 3 image 89). The visualized paravertebral soft tissues  otherwise appear unremarkable.      Impression    IMPRESSION:  1. No acute fracture or posttraumatic  malalignment of the cervical  spine.  2. Multilevel degenerative changes superimposed on developmental  spinal canal stenosis, as described.  3. Multiple thyroid nodules measuring up to 1.5 cm in the axial plane  on the right. Recommend follow-up thyroid ultrasound.    JUDI CAVAZOS MD         SYSTEM ID:  [id number]     Medications   0.9% sodium chloride BOLUS (0 mLs Intravenous Stopped 2/14/22 1520)   ketorolac (TORADOL) injection 15 mg (15 mg Intravenous Given 2/14/22 1521)   cyclobenzaprine (FLEXERIL) tablet 10 mg (10 mg Oral Given 2/14/22 1521)        Assessments & Plan (with Medical Decision Making)   Patient presents for evaluation multiple days after a fall where he struck his head.  He does not have any focal neurologic deficits here.  Does have some symptoms suggestive of possible concussion.  Will obtain CT of the head and cervical spine for further evaluation of injury including intracranial hemorrhage.  His vital signs here are within normal limits.  He was given oral Flexeril as well as IV fluids.  Does not have any other evidence of injury.  Does not have any midline cervical spine tenderness.  Does have some paraspinous muscle tenderness.  No midline thoracic or lumbar spine tenderness.  He has been ambulating without difficulty.  CT of the head is unremarkable without any sign of injury.    CT of the cervical spine does not reveal any acute fracture posttraumatic malalignment of the cervical spine.  Multilevel degenerative changes superimposed on developmental spinal canal stenosis.  This is unchanged from prior imaging.  Does have multiple thyroid nodules which I did discuss with the patient for follow-up as an outpatient and he voiced understanding.    He was given Toradol here as well.  He was improved on reevaluation.  He was advised to follow-up closely with his primary care provider and we discussed that he likely has a concussion and they may refer him to concussion clinic/physical therapy if  he is not improving.  He was advised to use ibuprofen and Tylenol for pain as well as Flexeril for muscle spasm which I did give him a short course prescription of.  He was given indications for return emergency department.  He voiced understanding and was in agreement with this plan and discharge.  He was discharged home in stable condition.    I have reviewed the nursing notes. I have reviewed the findings, diagnosis, plan and need for follow up with the patient.    Discharge Medication List as of 2/14/2022  3:25 PM          Final diagnoses:   Closed head injury, initial encounter   Strain of neck muscle, initial encounter       --  Danica Lee MD  [facility] EMERGENCY DEPARTMENT  2/14/2022     Danica Lee MD  05/23/22 1938

## 2022-03-19 ENCOUNTER — HEALTH MAINTENANCE LETTER (OUTPATIENT)
Age: 52
End: 2022-03-19

## 2022-07-09 ENCOUNTER — APPOINTMENT (OUTPATIENT)
Dept: CT IMAGING | Facility: CLINIC | Age: 52
End: 2022-07-09
Attending: EMERGENCY MEDICINE
Payer: COMMERCIAL

## 2022-07-09 ENCOUNTER — HOSPITAL ENCOUNTER (EMERGENCY)
Facility: CLINIC | Age: 52
Discharge: HOME | End: 2022-07-09
Attending: EMERGENCY MEDICINE | Admitting: EMERGENCY MEDICINE
Payer: COMMERCIAL

## 2022-07-09 ENCOUNTER — HEALTH MAINTENANCE LETTER (OUTPATIENT)
Age: 52
End: 2022-07-09

## 2022-07-09 VITALS
HEART RATE: 68 BPM | OXYGEN SATURATION: 100 % | SYSTOLIC BLOOD PRESSURE: 128 MMHG | TEMPERATURE: 98.4 F | RESPIRATION RATE: 16 BRPM | DIASTOLIC BLOOD PRESSURE: 79 MMHG

## 2022-07-09 DIAGNOSIS — S02.5XXA CLOSED FRACTURE OF INCISOR TEETH, INITIAL ENCOUNTER: ICD-10-CM

## 2022-07-09 DIAGNOSIS — R91.8 PULMONARY NODULES: ICD-10-CM

## 2022-07-09 DIAGNOSIS — E04.1 THYROID NODULE: ICD-10-CM

## 2022-07-09 DIAGNOSIS — H05.20 PROPTOSIS: ICD-10-CM

## 2022-07-09 DIAGNOSIS — K08.89 PAIN, DENTAL: ICD-10-CM

## 2022-07-09 LAB
ALBUMIN SERPL-MCNC: 3.3 G/DL (ref 3.4–5)
ALP SERPL-CCNC: 61 U/L (ref 40–150)
ALT SERPL W P-5'-P-CCNC: 27 U/L (ref 0–70)
ANION GAP SERPL CALCULATED.3IONS-SCNC: 6 MMOL/L (ref 3–14)
AST SERPL W P-5'-P-CCNC: 13 U/L (ref 0–45)
BASOPHILS # BLD AUTO: 0.1 10E3/UL (ref 0–0.2)
BASOPHILS NFR BLD AUTO: 1 %
BILIRUB SERPL-MCNC: 0.5 MG/DL (ref 0.2–1.3)
BUN SERPL-MCNC: 15 MG/DL (ref 7–30)
CALCIUM SERPL-MCNC: 9 MG/DL (ref 8.5–10.1)
CHLORIDE BLD-SCNC: 112 MMOL/L (ref 94–109)
CO2 SERPL-SCNC: 25 MMOL/L (ref 20–32)
CREAT SERPL-MCNC: 0.87 MG/DL (ref 0.66–1.25)
EOSINOPHIL # BLD AUTO: 0.2 10E3/UL (ref 0–0.7)
EOSINOPHIL NFR BLD AUTO: 1 %
ERYTHROCYTE [DISTWIDTH] IN BLOOD BY AUTOMATED COUNT: 14.8 % (ref 10–15)
ETHANOL SERPL-MCNC: <0.01 G/DL
GFR SERPL CREATININE-BSD FRML MDRD: >90 ML/MIN/1.73M2
GLUCOSE BLD-MCNC: 97 MG/DL (ref 70–99)
HCT VFR BLD AUTO: 49.5 % (ref 40–53)
HGB BLD-MCNC: 16 G/DL (ref 13.3–17.7)
IMM GRANULOCYTES # BLD: 0.1 10E3/UL
IMM GRANULOCYTES NFR BLD: 1 %
LYMPHOCYTES # BLD AUTO: 1.8 10E3/UL (ref 0.8–5.3)
LYMPHOCYTES NFR BLD AUTO: 10 %
MCH RBC QN AUTO: 28.1 PG (ref 26.5–33)
MCHC RBC AUTO-ENTMCNC: 32.3 G/DL (ref 31.5–36.5)
MCV RBC AUTO: 87 FL (ref 78–100)
MONOCYTES # BLD AUTO: 1.6 10E3/UL (ref 0–1.3)
MONOCYTES NFR BLD AUTO: 9 %
NEUTROPHILS # BLD AUTO: 13.8 10E3/UL (ref 1.6–8.3)
NEUTROPHILS NFR BLD AUTO: 78 %
NRBC # BLD AUTO: 0 10E3/UL
NRBC BLD AUTO-RTO: 0 /100
PLATELET # BLD AUTO: 292 10E3/UL (ref 150–450)
POTASSIUM BLD-SCNC: 3.6 MMOL/L (ref 3.4–5.3)
PROT SERPL-MCNC: 6.4 G/DL (ref 6.8–8.8)
RBC # BLD AUTO: 5.69 10E6/UL (ref 4.4–5.9)
SODIUM SERPL-SCNC: 143 MMOL/L (ref 133–144)
TSH SERPL DL<=0.005 MIU/L-ACNC: 0.77 MU/L (ref 0.4–4)
WBC # BLD AUTO: 17.6 10E3/UL (ref 4–11)

## 2022-07-09 PROCEDURE — 99285 EMERGENCY DEPT VISIT HI MDM: CPT | Performed by: EMERGENCY MEDICINE

## 2022-07-09 PROCEDURE — 99285 EMERGENCY DEPT VISIT HI MDM: CPT | Mod: 25 | Performed by: EMERGENCY MEDICINE

## 2022-07-09 PROCEDURE — D4323 HC SPLINT, EXTRA-CORONAL; NATURAL TEETH OR PROSTHETIC CROWNS: HCPCS | Performed by: EMERGENCY MEDICINE

## 2022-07-09 PROCEDURE — 72125 CT NECK SPINE W/O DYE: CPT

## 2022-07-09 PROCEDURE — 84443 ASSAY THYROID STIM HORMONE: CPT | Performed by: EMERGENCY MEDICINE

## 2022-07-09 PROCEDURE — 70486 CT MAXILLOFACIAL W/O DYE: CPT

## 2022-07-09 PROCEDURE — 36415 COLL VENOUS BLD VENIPUNCTURE: CPT | Performed by: EMERGENCY MEDICINE

## 2022-07-09 PROCEDURE — 85004 AUTOMATED DIFF WBC COUNT: CPT | Performed by: EMERGENCY MEDICINE

## 2022-07-09 PROCEDURE — 82077 ASSAY SPEC XCP UR&BREATH IA: CPT | Performed by: EMERGENCY MEDICINE

## 2022-07-09 PROCEDURE — 80053 COMPREHEN METABOLIC PANEL: CPT | Performed by: EMERGENCY MEDICINE

## 2022-07-09 PROCEDURE — 70450 CT HEAD/BRAIN W/O DYE: CPT

## 2022-07-09 PROCEDURE — 71250 CT THORAX DX C-: CPT

## 2022-07-09 ASSESSMENT — ENCOUNTER SYMPTOMS
NAUSEA: 0
SHORTNESS OF BREATH: 0
NECK PAIN: 1
VOMITING: 0
ABDOMINAL PAIN: 0
ARTHRALGIAS: 1

## 2022-07-09 NOTE — ED TRIAGE NOTES
Pt states no cervical pain tenderness and was placed in w/c.  Pt reminded of need to wear a mask but keeps taking it off.     Triage Assessment     Row Name 07/09/22 1110       Triage Assessment (Adult)    Airway WDL WDL       Respiratory WDL    Respiratory WDL WDL       Skin Circulation/Temperature WDL    Skin Circulation/Temperature WDL WDL       Cardiac WDL    Cardiac WDL WDL;chest pain       Chest Pain Assessment    Chest Pain Location other (see comments)  left sided rib pain worse with deep breath and movement    Precipitating Factors activity       Peripheral/Neurovascular WDL    Peripheral Neurovascular WDL WDL       Cognitive/Neuro/Behavioral WDL    Cognitive/Neuro/Behavioral WDL WDL

## 2022-07-09 NOTE — ED NOTES
"RN attempted to get breathalyzer from patient but unable to blow d/t rib pain. Pt states \"I know I broke some ribs\"  "

## 2022-07-09 NOTE — ED TRIAGE NOTES
Pt states he has been doing drugs and using alcohol and last used early this morning.  Pt states he fell in the shower and struck his face when he fell.  Pt complaining of left eye swelling, chipped front teeth and left sided rib pain. Pt has a hx of TBI.     Triage Assessment     Row Name 07/09/22 1110       Triage Assessment (Adult)    Airway WDL WDL       Respiratory WDL    Respiratory WDL WDL       Skin Circulation/Temperature WDL    Skin Circulation/Temperature WDL WDL       Cardiac WDL    Cardiac WDL WDL;chest pain       Chest Pain Assessment    Chest Pain Location other (see comments)  left sided rib pain worse with deep breath and movement    Precipitating Factors activity       Peripheral/Neurovascular WDL    Peripheral Neurovascular WDL WDL       Cognitive/Neuro/Behavioral WDL    Cognitive/Neuro/Behavioral WDL WDL

## 2022-07-09 NOTE — DISCHARGE INSTRUCTIONS
You have dental fractures from your fall.  Please follow-up with the [facility] dental clinic.  Please call [phone].  It is located at [address]  They would like to see you Friday of this coming week 7/15/2022 .  Take ibuprofen with food or Tylenol as needed for pain.  Your CT of your chest does not show rib fractures.  You do have a lung nodule in the left upper lobe.  You will need to get a repeat CT scan in 1 year (12 months) to further evaluate this lung nodule.  Usually these are nothing and are not cancer.  This CT should be done through your primary care doctor.  Your CT also shows nodules on your thyroid.  These should also be followed by your primary care doctor with an ultrasound.  Neither of these studies is emergent and they are not done through an emergency department.  Do not drink alcohol to excess

## 2022-07-09 NOTE — ED PROVIDER NOTES
"    [facility] EMERGENCY DEPARTMENT ([facility])     July 9, 2022    History     Chief Complaint   Patient presents with     Head Injury     HPI  Bill Wisdom is a 52 year old male with a past medical history including DM2, COPD, simple chronic bronchitis who presents to the Emergency Department for evaluation of facial pain following a fall yesterday. Patient reports that last night he was using marijuana and alcohol when it felt like \"the noise was getting higher and higher\" and he passed out in the bathroom and hit his head on the floor.  He is unsure which way he fell.  Endorses diffuse dental pain, chipped teeth and facial pain, endorses neck pain.  He also states that he has intense left rib pain that he says feels similar to the pain he had with previous broken ribs.  Is not currently intoxicated in the ED.    Past Medical History  Past Medical History:   Diagnosis Date     COPD (chronic obstructive pulmonary disease) (H)      Gastro-oesophageal reflux disease      History of tobacco abuse      Obese      CHANDLER (obstructive sleep apnea)      Prediabetes      Past Surgical History:   Procedure Laterality Date     HYDROCELECTOMY SCROTAL  2006     INJECT SACROILIAC JOINT Right 9/16/2019    Procedure: Right Sacroiliac Joint Injection;  Surgeon: Sina Laboy MD;  Location: [facility] OR     SPERMATOCELECTOMY  3/20/2012    Procedure:SPERMATOCELECTOMY; Left Spermatocelectomy; Surgeon:DELLA CROW; Location:[facility] OR     acetaminophen (TYLENOL) 500 MG tablet  acetaminophen (TYLENOL) 500 MG tablet  albuterol (PROVENTIL HFA) 108 (90 Base) MCG/ACT inhaler  albuterol (PROVENTIL) (2.5 MG/3ML) 0.083% neb solution  allopurinol (ZYLOPRIM) 100 MG tablet  Cholecalciferol (VITAMIN D3) 75 MCG (3000 UT) TABS  cyclobenzaprine (FLEXERIL) 10 MG tablet  ibuprofen (ADVIL/MOTRIN) 600 MG tablet  Multiple Vitamins-Minerals (MULTIVITAMIN ADULT) CHEW  naproxen (NAPROSYN) 500 MG tablet  order for DME  order for DME  oxyCODONE " (ROXICODONE) 5 MG tablet  tiZANidine (ZANAFLEX) 4 MG capsule  topiramate (TOPAMAX) 100 MG tablet  traZODone (DESYREL) 100 MG tablet      No Known Allergies  Family History  Family History   Problem Relation Age of Onset     C.A.D. Mother         triple bypass     Hypertension Father      Cerebrovascular Disease Father      Alcohol/Drug Sister      Social History   Social History     Tobacco Use     Smoking status: Former Smoker     Packs/day: 0.00     Types: Cigarettes     Quit date: 1/16/2019     Years since quitting: 3.4     Smokeless tobacco: Never Used   Substance Use Topics     Alcohol use: No     Comment: quit drinking jan 2019     Drug use: Yes     Types: Marijuana     Comment: Last used 2 days ago      Past medical history, past surgical history, medications, allergies, family history, and social history were reviewed with the patient. No additional pertinent items.       Review of Systems   HENT: Positive for dental problem.         Facial and dental pain after he fell and hit his head on the floor   Respiratory: Negative for shortness of breath.    Cardiovascular:        Rib pain   Gastrointestinal: Negative for abdominal pain, nausea and vomiting.   Musculoskeletal: Positive for arthralgias (left rib pain) and neck pain.   Neurological: Positive for syncope.   All other systems reviewed and are negative.    Physical Exam   BP: 128/82  Pulse: 66  Temp: 98.4  F (36.9  C)  Resp: 16  SpO2: 100 %  Physical Exam  Abdominal:           Physical Exam   Constitutional:   well nourished, well developed, resting comfortably   HENT:   Head: Normocephalic and atraumatic.   Eyes: Conjunctivae are normal. Pupils are equal, round, and reactive to light.  Eyes appears slightly proptotic, EOMI, no bony orbital tenderness.  TMS are clear, no hemotympanum, pharynx has no erythema or exudate, mucous membranes are moist, upper central incisors are chipped, teeth are somewhat loose no trismus, no TMJ click  Neck:   no  adenopathy,  bony tenderness to mid cervical spine  Cardiovascular: regular rate and rhythm without murmurs or gallops  Chest: Tenderness to left lateral ribs, no crepitance, no deformity no ecchymosis noted  Pulmonary/Chest: Clear to auscultation bilaterally, with no wheezes or retractions. No respiratory distress.  GI: Soft with good bowel sounds.  Non-tender, non-distended, with no guarding, no rebound, no peritoneal signs.   Back:  No bony or CVA tenderness   Musculoskeletal:  no edema  Skin: Skin is warm and dry. No rash noted.   Neurological: alert and oriented to person, place, and time. Nonfocal exam, GCS is 15.    Psychiatric: Speech is slow Judgment and thought content normal. mood appears normal. Patient is not agitated, not aggressive, not hyperactive, not actively hallucinating and not combative. Thought content is not paranoid and not delusional. Cognition and memory are normal. No homicidal and no suicidal ideation.   ED Course     11:29 AM  The patient was seen and examined by Neela Alvarez MD in Room HW02.    Procedures       The medical record was reviewed and interpreted.  Current labs reviewed and interpreted.  Current images reviewed and interpreted: see below.   Dental consult              Results for orders placed or performed during the hospital encounter of 07/09/22   Head CT w/o contrast     Status: None    Narrative    EXAM: CT HEAD W/O CONTRAST  LOCATION: [facility]  DATE/TIME: 7/9/2022 11:59 AM    INDICATION: Traumatic head injury with loss of consciousness. Persistent nausea.  COMPARISON: None.  TECHNIQUE: Routine CT Head without IV contrast. Multiplanar reformats. Dose reduction techniques were used.    FINDINGS:  INTRACRANIAL CONTENTS: No intracranial hemorrhage, extraaxial collection, or mass effect.  No CT evidence of acute infarct. Normal parenchymal attenuation. Normal ventricles and sulci.     VISUALIZED ORBITS/SINUSES/MASTOIDS: No  intraorbital abnormality. No paranasal sinus mucosal disease. No middle ear or mastoid effusion.    BONES/SOFT TISSUES: No acute abnormality.      Impression    IMPRESSION:  1.  Normal head CT.   Cervical spine CT w/o contrast     Status: None    Narrative    EXAM: CT CERVICAL SPINE WITHOUT CONTRAST  LOCATION: [facility]  DATE/TIME: 07/09/2022, 11:59 AM    INDICATION: Posterior neck pain. Fall while intoxicated, trauma.  COMPARISON: 02/14/2022.  TECHNIQUE: Routine CT Cervical Spine without IV contrast. Multiplanar reformats. Dose reduction techniques were used.    FINDINGS:  VERTEBRA: No acute fracture. Straightening of the normal cervical lordosis, which may be positional. Minimal levoconvex curvature of the lower cervical/upper thoracic spine. No posttraumatic malalignment seen. Developmentally bifid posterior arch of C1,   a normal variant.    CANAL/FORAMINA: Mild to moderate disc height loss at C5-C6 with minimal disc height loss elsewhere. Bilateral uncovertebral osteophytes primarily at C5-C6. Mild scattered degenerative facet disease in the cervical spine. Moderate degenerative facet   disease at T2-T3 bilaterally. This appears unchanged. There also appears to be an element of diffuse developmental spinal canal stenosis, as before. There is at least moderate spinal canal stenosis at C5-C6 in the setting of a diffuse disc osteophyte   complex. There appear to be relatively lesser degrees of spinal canal narrowing elsewhere. Mild to moderate bilateral C5-C6 neural foraminal stenosis. No other significant osseous neural foraminal narrowing.    PARASPINAL: Multiple thyroid nodules are again noted. Coarse calcification in the posterior aspect of the left thyroid lobe is noted. On the previous exam, there appeared to be a right thyroid nodule that measured up to approximately 15 mm, although this   is less conspicuous on the current study. Mild right carotid bifurcation  atherosclerosis.      Impression    IMPRESSION:  1.  No acute fracture or posttraumatic malalignment of the cervical spine.  2.  Multilevel degenerative changes superimposed on developmental spinal canal stenosis, as described.  3.  Multiple thyroid nodules again noted. If not performed previously, consider follow-up thyroid ultrasound for further assessment.     CT Facial Bones without Contrast     Status: None    Narrative    EXAM: CT FACIAL BONES WITHOUT CONTRAST  LOCATION: [facility]  DATE/TIME: 07/09/2022, 11:59 AM    INDICATION: Patient with facial pain, eye swelling, chipped teeth after falling forward while intoxicated.  COMPARISON: CT head of same day and prior.  TECHNIQUE: Routine CT Maxillofacial without IV contrast. Multiplanar reformats. Dose reduction techniques were used.     FINDINGS: The pterygoid plates are intact. The bilateral zygomatic arches, sphenotemporal buttresses, the walls of both orbits, and the walls of the maxillary sinuses are intact. No displaced nasal arch or nasal septal fracture. The anterior skull base   is intact. The mandible is intact. The bilateral temporomandibular joints are normally located.    There is a mildly displaced and minimally comminuted fracture involving the left anterior paramedian maxillary alveolus which involves the region of the roots of the left central and lateral maxillary incisors (series 2 image 64). There is minimal   lucency surrounding the left central and lateral maxillary incisor roots, concerning for loosening. There is a nondisplaced fracture involving the right central maxillary incisor (series 6 image 15, series 7 image 74).    There is bilateral ocular globe proptosis which appears symmetric. There is somewhat prominent intraorbital fat. No aggressive appearing intraorbital soft tissue mass or retrobulbar hematoma seen. The optic nerve/sheath complexes and extraocular muscles   appear normal.  Trace mucosal thickening in the ethmoid sinuses. No air-fluid levels. Unchanged probable benign ovoid sclerotic lesion in the right inferolateral frontal calvarium measuring 6-7 mm (series 2 image 102) possibly representing a bone island.   Developmentally bifid posterior arch of C1, a normal variant.      Impression    IMPRESSION:  1.  Acute mildly displaced and minimally comminuted fracture involving the left anterior paramedian maxillary alveolus in the region of the roots of the left central and lateral maxillary incisors. There is evidence for posttraumatic loosening of those   teeth. There is a nondisplaced fracture of the right central maxillary incisor.  2.  No other acute maxillofacial fracture seen.  3.  Bilateral ocular globe proptosis. No retrobulbar hematoma or intraorbital mass identified.     Chest CT w/o contrast     Status: None    Narrative    EXAM: CT CHEST WITHOUT CONTRAST  LOCATION: [facility]  DATE/TIME: 07/09/2022, 11:59 AM    INDICATION: Fall while intoxicated. Left lateral rib pain.  COMPARISON: None.  TECHNIQUE: CT chest without IV contrast. Multiplanar reformats were obtained. Dose reduction techniques were used.  CONTRAST: None.    FINDINGS:   LUNGS AND PLEURA: Mild linear atelectasis and/or fibrosis. No effusions. No consolidation. No pneumothorax. 2 mm nodule in the lateral left upper lobe image 126.    MEDIASTINUM/AXILLAE: No gross adenopathy in the absence of contrast. No aneurysm.    CORONARY ARTERY CALCIFICATION: Mild.    UPPER ABDOMEN: No significant finding.    MUSCULOSKELETAL: No definite displaced rib fractures.      Impression    IMPRESSION:   1.  No acute process demonstrated.  2.  2 mm left upper lobe nodule.    Lung Nodule Size and Recommendations:  < 6 mm lung nodule:  Low-Risk Patient: No routine follow up.  High-Risk Patient: Optional follow-up CT at 12 months; if unchanged, no further follow-up.    *Low Risk: Minimal or  absent history of smoking and of other known risk factors.  *Nonsolid (ground-glass) or partly solid nodules may require longer follow-up to exclude indolent adenocarcinoma.  *Recommendations based on Guidelines for the Management of incidental Pulmonary Nodules Detected at CT: From the Fleischner Society, Radiology 2017.    1.  The patient is a candidate for a lung nodule program which can assist in nodule surveillance and scheduling follow up exams.     Comprehensive metabolic panel     Status: Abnormal   Result Value Ref Range    Sodium 143 133 - 144 mmol/L    Potassium 3.6 3.4 - 5.3 mmol/L    Chloride 112 (H) 94 - 109 mmol/L    Carbon Dioxide (CO2) 25 20 - 32 mmol/L    Anion Gap 6 3 - 14 mmol/L    Urea Nitrogen 15 7 - 30 mg/dL    Creatinine 0.87 0.66 - 1.25 mg/dL    Calcium 9.0 8.5 - 10.1 mg/dL    Glucose 97 70 - 99 mg/dL    Alkaline Phosphatase 61 40 - 150 U/L    AST 13 0 - 45 U/L    ALT 27 0 - 70 U/L    Protein Total 6.4 (L) 6.8 - 8.8 g/dL    Albumin 3.3 (L) 3.4 - 5.0 g/dL    Bilirubin Total 0.5 0.2 - 1.3 mg/dL    GFR Estimate >90 >60 mL/min/1.73m2   Alcohol level blood     Status: Normal   Result Value Ref Range    Alcohol ethyl <0.01 <=0.01 g/dL   CBC with platelets and differential     Status: Abnormal   Result Value Ref Range    WBC Count 17.6 (H) 4.0 - 11.0 10e3/uL    RBC Count 5.69 4.40 - 5.90 10e6/uL    Hemoglobin 16.0 13.3 - 17.7 g/dL    Hematocrit 49.5 40.0 - 53.0 %    MCV 87 78 - 100 fL    MCH 28.1 26.5 - 33.0 pg    MCHC 32.3 31.5 - 36.5 g/dL    RDW 14.8 10.0 - 15.0 %    Platelet Count 292 150 - 450 10e3/uL    % Neutrophils 78 %    % Lymphocytes 10 %    % Monocytes 9 %    % Eosinophils 1 %    % Basophils 1 %    % Immature Granulocytes 1 %    NRBCs per 100 WBC 0 <1 /100    Absolute Neutrophils 13.8 (H) 1.6 - 8.3 10e3/uL    Absolute Lymphocytes 1.8 0.8 - 5.3 10e3/uL    Absolute Monocytes 1.6 (H) 0.0 - 1.3 10e3/uL    Absolute Eosinophils 0.2 0.0 - 0.7 10e3/uL    Absolute Basophils 0.1 0.0 - 0.2 10e3/uL     Absolute Immature Granulocytes 0.1 <=0.4 10e3/uL    Absolute NRBCs 0.0 10e3/uL   CBC with platelets differential     Status: Abnormal    Narrative    The following orders were created for panel order CBC with platelets differential.  Procedure                               Abnormality         Status                     ---------                               -----------         ------                     CBC with platelets and d...[[id number]]  Abnormal            Final result                 Please view results for these tests on the individual orders.     Medications - No data to display     Assessments & Plan (with Medical Decision Making)       I have reviewed the nursing notes.  Emergency Department course:  The patient was seen and examined at 1129 am in Hallway 2.   Laboratory studies show leukocytosis, with a WBC of 17.6.  The remainder of the CBC is unremarkable.  Comprehensive metabolic panel is essentially unremarkable.  Ethanol level is less than 0.01.    Head CT is unremarkable.  Chest CT shows: IMPRESSION:   1.  No acute process demonstrated.  2.  2 mm left upper lobe nodule.  No acute rib fracture  CT of the facial bones show IMPRESSION:  1.  Acute mildly displaced and minimally comminuted fracture involving the left anterior paramedian maxillary alveolus in the region of the roots of the left central and lateral maxillary incisors. There is evidence for posttraumatic loosening of those   teeth. There is a nondisplaced fracture of the right central maxillary incisor.  2.  No other acute maxillofacial fracture seen.  3.  Bilateral ocular globe proptosis. No retrobulbar hematoma or intraorbital mass identified.    Cervical spine CT  Shows IMPRESSION:  1.  No acute fracture or posttraumatic malalignment of the cervical spine.  2.  Multilevel degenerative changes superimposed on developmental spinal canal stenosis, as described.  3.  Multiple thyroid nodules again noted. If not performed previously,  consider follow-up thyroid ultrasound for further assessment.    I consulted Dental, who evaluated the patient in the emergency department and placed a splint.  He recommends follow up in [facility] physicians dental clinic.  Please call [phone].  It is located at [address]  They would like him to follow-up on Friday, July 15.  Bill Wisdom is a 52 year old male who presents with facial pain and rib pain after a fall yesterday.  He had been drinking too much and using marijuana.  Comprehensive laboratory studies are essentially unremarkable.    He has dental fractures and has been evaluated by Dental in the ED. Follow-up is as noted above.  I recommend taking ibuprofen with food or Tylenol as needed for pain.    The patient also has multiple incidental findings, including a pulmonary nodule.  This will require a follow-up chest CT in 1 year.  This should be done with his primary care doctor.  He has thyroid nodules of unclear significance.  He will need a thyroid ultrasound and follow-up with his regular doctor.  This has all been discussed with the patient.  The patient has left rib pain but no evidence of rib fractures.  He can use ice to the sore areas.  He was counseled not to drink alcohol to excess.       I have reviewed the findings, diagnosis, plan and need for follow up with the patient.    New Prescriptions    No medications on file       Final diagnoses:   Closed fracture of incisor teeth, initial encounter   Pain, dental   Pulmonary nodules   Thyroid nodule   Proptosis     I, Neyda Valdes, am serving as a trained medical scribe to document services personally performed by Neela Alvarez MD, based on the provider's statements to me.   INeela MD, was physically present and have reviewed and verified the accuracy of this note documented by Neyda Valdes.    --This note was created in part by the use of Dragon voice recognition dictation system. Inadvertent grammatical  errors and typographical errors may still exist.  Neela Alvarez MD      [facility] EMERGENCY DEPARTMENT  7/9/2022     Neela Alvarez MD  07/09/22 [id number]

## 2022-07-10 NOTE — CONSULTS
Dental Service Consultation      Bill Wisdom MRN# [id number]  YOB: 1970 Age: 52 year old  Date of Admission: 7/9/2022    Reason for consult: I was asked by Neela Alvarez MD,  to evaluate this patient for TDI.     Chief Complaint:  Patient presented to the ER, due to blunt force trauma to his anterior maxillary teeth and buccal cortical plate immediately superior to maxillary incisors.     Assessment and Plan:  Assessment: Coronal fracture of tooth #8 involving the incisal 1/3 and coronal cervical 1/3, no pulp exposure. Coronal fracture of #9 involving the incisal 2/3 with pulp exposure. Tooth #10 had a enamel fracture of the tooth involving the incisal edge and mesial aspect of the tooth, fracture spanned the incisal edge to approximately 1/2down the crown in length and 1/3 of the crown mesial-distal in width.    Radiographic exam: Dental CT reveals adult dentition. Condyles seated in fossa bilaterally, maxillary sinuses clear bilaterally. Fracture buccal cortical plate.     Extraoral exam: NC/AT, EOMI, PERRL, No submandibular lymphadenopathy, no induration or erythema, no abnormal skin lesions, inferior border of mandible is palpable bilaterally, LOKI >45mm. No TMJ discomfort bilaterally. FOM soft and non tender. No clicking of TMJ on opening and lateral movements.     Intraoral exam: Reveals decayed and poor dentition. Patient symptomatic to palpation and percussion on teeth 8 and 9. Mallampati III. LOKI ~45mm.      Plan:  - Give patient buccal and palatal injections of local anesthetic with Articaine 4% with  Epi 1:100,00. Attempt to reposition buccal cortical plate manually. Splint teeth #6-#11 on the lingual using flexible metal wire, Ni-Ti, to stabilize patient's dentition until follow up visit at [facility].     - Patient will be seen at Dental Clinic as inpatient for extraction of teeth (scheduling team of [facility] will be notified). Patient is aware that they will not be  sedated and that there is only nitrous oxide that the clinic offers. Patient accepts to be seen at the [facility] for their teeth.    - Patient will be seen at the [facility] once the patient is stable enough to transfer. Ideal follow-up timeframe is 6-9 days.     - Physician of the patient has been notified of the procedure on how to make an appointment for the patient (CALL : [phone] and make the arrangements for moving the patient to the clinic or to coordinate operating room scheduling).     Clinic information:   [facility]  [address] [place] [address]  [phone]    Procedure: History is obtained from the patient            History of Present Illness:  This patient is a 52 year old male who presents to [facility] ED and admitted  with TDI.    Past Medical History:  Past Medical History:   Diagnosis Date     COPD (chronic obstructive pulmonary disease) (H)      Gastro-oesophageal reflux disease      History of tobacco abuse      Obese      CHANDLER (obstructive sleep apnea)      Prediabetes        Past Surgical History:  Past Surgical History:   Procedure Laterality Date     HYDROCELECTOMY SCROTAL  2006     INJECT SACROILIAC JOINT Right 9/16/2019    Procedure: Right Sacroiliac Joint Injection;  Surgeon: Sina Laboy MD;  Location:  OR     SPERMATOCELECTOMY  3/20/2012    Procedure:SPERMATOCELECTOMY; Left Spermatocelectomy; Surgeon:DELLA CROW; Location: OR       Social History:  Social History     Tobacco Use     Smoking status: Former Smoker     Packs/day: 0.00     Types: Cigarettes     Quit date: 1/16/2019     Years since quitting: 3.4     Smokeless tobacco: Never Used   Substance Use Topics     Alcohol use: No     Comment: quit drinking jan 2019       Family History:  Family History   Problem Relation Age of Onset     C.A.D. Mother         triple bypass     Hypertension Father      Cerebrovascular Disease Father       Alcohol/Drug Sister        Immunizations:  Immunization History   Administered Date(s) Administered     COVID-19,PF,Reshma 07/16/2021     COVID-19,PF,Moderna 02/02/2022     Influenza (IIV3) PF 01/04/2012     TDAP Vaccine (Adacel) 01/18/2012       Allergies:  No Known Allergies    Medications:  No current Epic-ordered facility-administered medications on file.     Current Outpatient Medications Ordered in Epic   Medication     acetaminophen (TYLENOL) 500 MG tablet     acetaminophen (TYLENOL) 500 MG tablet     albuterol (PROVENTIL HFA) 108 (90 Base) MCG/ACT inhaler     albuterol (PROVENTIL) (2.5 MG/3ML) 0.083% neb solution     allopurinol (ZYLOPRIM) 100 MG tablet     Cholecalciferol (VITAMIN D3) 75 MCG (3000 UT) TABS     cyclobenzaprine (FLEXERIL) 10 MG tablet     ibuprofen (ADVIL/MOTRIN) 600 MG tablet     Multiple Vitamins-Minerals (MULTIVITAMIN ADULT) CHEW     naproxen (NAPROSYN) 500 MG tablet     order for DME     order for DME     oxyCODONE (ROXICODONE) 5 MG tablet     tiZANidine (ZANAFLEX) 4 MG capsule     topiramate (TOPAMAX) 100 MG tablet     traZODone (DESYREL) 100 MG tablet       Review of Systems:  The 10 point Review of Systems is negative other than noted in the HPI    Physical Exam:  Vitals were reviewed  Temp: 98.4  F (36.9  C) Temp src: Oral BP: 128/79 Pulse: 68   Resp: 16 SpO2: 100 % O2 Device: None (Room air)          Head and neck exam:  HEENT: NC/AT, EOMI, PERRL, No submandibular lymphadenopathy, no induration or erythema, no abnormal skin lesions, inferior border of mandible is palpable bilaterally, LOKI >45mm. No TMJ discomfort bilaterally. FOM soft and non tender. No clicking of TMJ on opening and lateral movements.    Data:  Radiographic interpretation: Dental CT taken on  7/9/22  Osseous pathology:   Pulpal Pathology: Pulp exposure of tooth #9  Periodontal Pathology: N/A  Caries: Noted on tooth N/A  Odontogenic pathology: N/A      The patient was discussed with:   ED MD: Dr. Keita  Kim  Dental Resident: Jose Fan PGY-1  Dental Attending: Dr. MP Cole      PGY1  Pager: [phone]

## 2022-07-12 PROBLEM — R91.8 PULMONARY NODULES: Status: ACTIVE | Noted: 2022-07-12

## 2022-08-25 NOTE — PROGRESS NOTES
HPI: ***        PAST MEDICAL HISTORY:  Past Medical History:   Diagnosis Date     COPD (chronic obstructive pulmonary disease) (H)      Gastro-oesophageal reflux disease      History of tobacco abuse      Obese      Prediabetes    Cocaine use  Dyslipidemia    CURRENT MEDICATIONS:  Current Outpatient Medications   Medication Sig Dispense Refill     acetaminophen (TYLENOL) 500 MG tablet Take 1-2 tablets (500-1,000 mg) by mouth every 6 hours as needed for mild pain or fever 90 tablet 1     albuterol (ACCUNEB) 1.25 MG/3ML neb solution Take 1 vial (1.25 mg) by nebulization every 6 hours as needed for shortness of breath / dyspnea or wheezing 60 vial 3     albuterol (PROVENTIL HFA) 108 (90 Base) MCG/ACT inhaler Inhale 2 puffs into the lungs every 6 hours as needed for shortness of breath / dyspnea or wheezing 8.5 g 3     allopurinol (ZYLOPRIM) 100 MG tablet Take 1 tablet (100 mg) by mouth daily 90 tablet 1     cholecalciferol (VITAMIN D3) 5000 units TABS tablet Take 5,000 Units by mouth daily       CRANBERRY        famotidine (PEPCID) 20 MG tablet Take 20 mg by mouth       FISH OIL        ibuprofen (ADVIL/MOTRIN) 600 MG tablet Take 1 tablet (600 mg) by mouth 4 times daily as needed for moderate pain 120 tablet 1     Multiple Vitamins-Minerals (MULTIVITAMIN ADULT) CHEW Take 1 chew tab by mouth daily 60 tablet 11     naproxen (NAPROSYN) 500 MG tablet Take 1 tablet (500 mg) by mouth 2 times daily as needed for moderate pain 60 tablet 1     ondansetron (ZOFRAN ODT) 4 MG ODT tab 1-2 tab dissolve on tongue 4 times daily as needed for nausea, max daily dose 4 tab       order for DME Equipment being ordered: Nebulizer and equipment 1 each 0     order for DME Equipment being ordered: Cane 1 Device 0     Semaglutide,0.25 or 0.5MG/DOS, (OZEMPIC, 0.25 OR 0.5 MG/DOSE,) 2 MG/1.5ML SOPN Inject 0.25 mg Subcutaneous once a week for 28 days, THEN 0.5 mg once a week. 1.5 mL 2     tiZANidine (ZANAFLEX) 4 MG capsule Take 1 capsule (4 mg) by  Health Maintenance Due   Topic Date Due   • COVID-19 Vaccine (3 - Booster for Pediatric Pfizer series) 05/04/2022   • Annual Physical (ages 3-18)  08/24/2022       Patient is due for topics as listed above but is not proceeding with Immunization(s) COVID-19 at this time. COVID vaccine unavailable in clinic at this time.   mouth 2 times daily as needed for muscle spasms 30 capsule 1     topiramate (TOPAMAX) 25 MG tablet Take 2 tablets (50 mg) daily at dinnertime for 3 days, then increase to 3 tablets (75 mg) daily at dinnertime. 90 tablet 2     traZODone (DESYREL) 100 MG tablet Take 100 mg by mouth At Bedtime       DULoxetine (CYMBALTA) 30 MG capsule Take 1 capsule (30 mg) by mouth daily 30 capsule 1       PAST SURGICAL HISTORY:  Past Surgical History:   Procedure Laterality Date     HYDROCELECTOMY SCROTAL  2006     INJECT SACROILIAC JOINT Right 2019    Procedure: Right Sacroiliac Joint Injection;  Surgeon: Sina Laboy MD;  Location: [facility] OR     SPERMATOCELECTOMY  3/20/2012    Procedure:SPERMATOCELECTOMY; Left Spermatocelectomy; Surgeon:DELLA CROW; Location:[facility] OR       ALLERGIES   No Known Allergies    FAMILY HISTORY:  Family History   Problem Relation Age of Onset     C.A.D. Mother         triple bypass     Hypertension Father      Cerebrovascular Disease Father      Alcohol/Drug Sister        SOCIAL HISTORY:  Social History     Socioeconomic History     Marital status: Single     Spouse name: None     Number of children: None     Years of education: None     Highest education level: None   Occupational History     None   Social Needs     Financial resource strain: None     Food insecurity:     Worry: None     Inability: None     Transportation needs:     Medical: None     Non-medical: None   Tobacco Use     Smoking status: Former Smoker     Packs/day: 0.00     Types: Cigarettes     Last attempt to quit: 3/16/2019     Years since quittin.6     Smokeless tobacco: Never Used   Substance and Sexual Activity     Alcohol use: No     Comment: quit drinking 6 months ago     Drug use: Not Currently     Sexual activity: Not Currently     Partners: Female     Comment: Past history of cocaine use   Lifestyle     Physical activity:     Days per week: None     Minutes per session: None     Stress: None   Relationships      "Social connections:     Talks on phone: None     Gets together: None     Attends Zoroastrian service: None     Active member of club or organization: None     Attends meetings of clubs or organizations: None     Relationship status: None     Intimate partner violence:     Fear of current or ex partner: None     Emotionally abused: None     Physically abused: None     Forced sexual activity: None   Other Topics Concern     Parent/sibling w/ CABG, MI or angioplasty before 65F 55M? No   Social History Narrative     None       ROS:   Constitutional: No fever, chills, or sweats. No weight gain/loss   ENT: No visual disturbance, ear ache, epistaxis, sore throat  Allergies/Immunologic: Negative.   Respiratory: No cough, hemoptysia  Cardiovascular: As per HPI  GI: No nausea, vomiting, hematemesis, melena, or hematochezia  : No urinary frequency, dysuria, or hematuria  Integument: Negative  Psychiatric: Negative  Neuro: Negative  Endocrinology: Negative   Musculoskeletal: Negative    EXAM:  /71 (BP Location: Left arm, Patient Position: Chair, Cuff Size: Adult Large)   Pulse 63   Ht 1.727 m (5' 8\")   Wt 131.1 kg (289 lb)   SpO2 94%   BMI 43.94 kg/m    In general, the patient is a pleasant male in no apparent distress.    HEENT: NC/AT.  PERRLA.  EOMI.  Sclerae white, not injected.  Nares clear.  Pharynx without erythema or exudate.  Dentition intact.    Neck: No adenopathy.  No thyromegaly. Carotids +4/4 bilaterally without bruits.  No jugular venous distension.   Heart: RRR. Normal S1, S2 splits physiologically. No murmur, rub, click, or gallop. The PMI is in the 5th ICS in the midclavicular line. There is no heave.    Lungs: CTA.  No ronchi, wheezes, rales.  No dullness to percussion.   Abdomen: Soft, nontender, nondistended. No organomegaly.  No bruits.   Extremities: No clubbing, cyanosis, or edema.  The pulses are +4/4 at the radial, brachial, femoral, popliteal, DP, and PT sites bilaterally.  No bruits are " noted.  Neurologic: Alert and oriented to person/place/time, normal speech, gait and affect  Skin: No petechiae, purpura or rash.    Labs:  LIPID RESULTS:  Lab Results   Component Value Date    CHOL 203 (H) 10/02/2019    HDL 45 10/02/2019     (H) 10/02/2019    TRIG 93 10/02/2019    CHOLHDLRATIO 4.8 01/04/2012    NHDL 158 (H) 10/02/2019       LIVER ENZYME RESULTS:  Lab Results   Component Value Date    AST 21 10/02/2019    ALT 55 10/02/2019       CBC RESULTS:  Lab Results   Component Value Date    WBC 9.8 10/02/2019    RBC 5.28 10/02/2019    HGB 14.2 10/02/2019    HCT 46.2 10/02/2019    MCV 88 10/02/2019    MCH 26.9 10/02/2019    MCHC 30.7 (L) 10/02/2019    RDW 14.5 10/02/2019     10/02/2019       BMP RESULTS:  Lab Results   Component Value Date     10/02/2019    POTASSIUM 4.0 10/02/2019    CHLORIDE 106 10/02/2019    CO2 30 10/02/2019    ANIONGAP 4 10/02/2019    GLC 94 10/02/2019    BUN 15 10/02/2019    CR 0.84 10/02/2019    GFRESTIMATED >90 10/02/2019    GFRESTBLACK >90 10/02/2019    LUCINDA 9.0 10/02/2019        A1C RESULTS:  Lab Results   Component Value Date    A1C 6.5 (H) 10/02/2019       INR RESULTS:  No results found for: INR    Cardiac data:    ECG today was personally reviewed and shows normal sinus rhythm at 62, normal axis and intervals. Non specific T wave abnormalities          Assessment and Plan: ***          ATTENDING ATTESTATION:  This patient has been seen and examined by me November 11, 2019 with Jaden Hoyos MD, cardiology fellow. I have reviewed the vitals, laboratory and imaging data relevant to this patient's care. I have edited this note to reflect our joint assessment and plan, and discussed the plan with the patient.    Bill Wisdom is a 49 year old year old male with   Morbid obesity  Mixed dyslipidemia  Pre diabetes   COPD  Sleep apnea on CPAP     Patient is without overt angina or heart failure symptoms. Reports limited exercise tolerance mainly due to lower back pain and  some exertional dyspnea works as a .  Family history of coronary artery disease in his parents in their 60s.  He has a prior history of tobacco and cocaine use.  He is euvolemic on exam, blood pressure and heart rate are in normal range. ECG shows sinus rhythm. Recent labs reviewed, LDL-c 139 mg/dL, hemoglobin, renal and liver function within normal limits.    Based on his limited exercise tolerance and risk factor burden we will proceed with an outpatient dobutamine echocardiogram to assess for inducible ischemia.  Baseline echo will also help assess for pulmonary hypertension given the morbid obesity, COPD and sleep apnea. Patient will be notified of the results and a subsequent follow-up will be scheduled as needed. Healthy lifestyle was reinforced with the patient.         Bianca Mart MD, MS  Staff Cardiologist  Pager: [phone]    CC  Patient Care Team:  Amalia Hernandez APRN CNP as PCP - General (Nurse Practitioner - Family)  Amalia Hernandez APRN CNP as Nurse Practitioner (Nurse Practitioner - Family)  Bloch, Lauren Turner, [facility] as Pharmacist (Pharmacist)  JACKELYN SCOTT

## 2022-09-03 ENCOUNTER — HEALTH MAINTENANCE LETTER (OUTPATIENT)
Age: 52
End: 2022-09-03

## 2023-01-15 ENCOUNTER — HEALTH MAINTENANCE LETTER (OUTPATIENT)
Age: 53
End: 2023-01-15

## 2023-01-19 ENCOUNTER — OFFICE VISIT (OUTPATIENT)
Dept: INTERNAL MEDICINE | Facility: CLINIC | Age: 53
End: 2023-01-19
Payer: COMMERCIAL

## 2023-01-19 ENCOUNTER — LAB (OUTPATIENT)
Dept: LAB | Facility: CLINIC | Age: 53
End: 2023-01-19
Payer: COMMERCIAL

## 2023-01-19 VITALS
BODY MASS INDEX: 40.47 KG/M2 | SYSTOLIC BLOOD PRESSURE: 114 MMHG | HEIGHT: 66 IN | HEART RATE: 60 BPM | OXYGEN SATURATION: 97 % | DIASTOLIC BLOOD PRESSURE: 80 MMHG | WEIGHT: 251.8 LBS

## 2023-01-19 DIAGNOSIS — M53.3 SACROILIAC JOINT DYSFUNCTION OF RIGHT SIDE: ICD-10-CM

## 2023-01-19 DIAGNOSIS — Z13.29 SCREENING FOR THYROID DISORDER: ICD-10-CM

## 2023-01-19 DIAGNOSIS — Z13.0 SCREENING FOR DEFICIENCY ANEMIA: ICD-10-CM

## 2023-01-19 DIAGNOSIS — M54.41 CHRONIC BILATERAL LOW BACK PAIN WITH RIGHT-SIDED SCIATICA: ICD-10-CM

## 2023-01-19 DIAGNOSIS — G89.29 CHRONIC BILATERAL LOW BACK PAIN WITH RIGHT-SIDED SCIATICA: ICD-10-CM

## 2023-01-19 DIAGNOSIS — J41.0 SIMPLE CHRONIC BRONCHITIS (H): ICD-10-CM

## 2023-01-19 DIAGNOSIS — M10.9 GOUT, ARTHROPATHY: ICD-10-CM

## 2023-01-19 DIAGNOSIS — J44.9 CHRONIC OBSTRUCTIVE PULMONARY DISEASE, UNSPECIFIED COPD TYPE (H): ICD-10-CM

## 2023-01-19 DIAGNOSIS — Z13.220 SCREENING FOR HYPERLIPIDEMIA: ICD-10-CM

## 2023-01-19 DIAGNOSIS — Z12.5 SCREENING FOR PROSTATE CANCER: ICD-10-CM

## 2023-01-19 DIAGNOSIS — M54.2 NECK PAIN: ICD-10-CM

## 2023-01-19 DIAGNOSIS — Z23 NEEDS FLU SHOT: Primary | ICD-10-CM

## 2023-01-19 DIAGNOSIS — E11.9 TYPE 2 DIABETES MELLITUS WITHOUT COMPLICATION, WITHOUT LONG-TERM CURRENT USE OF INSULIN (H): ICD-10-CM

## 2023-01-19 DIAGNOSIS — Z23 NEED FOR TDAP VACCINATION: ICD-10-CM

## 2023-01-19 LAB
ALBUMIN SERPL BCG-MCNC: 4.4 G/DL (ref 3.5–5.2)
ALP SERPL-CCNC: 68 U/L (ref 40–129)
ALT SERPL W P-5'-P-CCNC: 22 U/L (ref 10–50)
ANION GAP SERPL CALCULATED.3IONS-SCNC: 10 MMOL/L (ref 7–15)
AST SERPL W P-5'-P-CCNC: 18 U/L (ref 10–50)
BILIRUB SERPL-MCNC: 0.4 MG/DL
BUN SERPL-MCNC: 16.9 MG/DL (ref 6–20)
CALCIUM SERPL-MCNC: 9.6 MG/DL (ref 8.6–10)
CHLORIDE SERPL-SCNC: 106 MMOL/L (ref 98–107)
CHOLEST SERPL-MCNC: 193 MG/DL
CREAT SERPL-MCNC: 0.95 MG/DL (ref 0.67–1.17)
CREAT UR-MCNC: 199 MG/DL
DEPRECATED HCO3 PLAS-SCNC: 27 MMOL/L (ref 22–29)
ERYTHROCYTE [DISTWIDTH] IN BLOOD BY AUTOMATED COUNT: 13.9 % (ref 10–15)
GFR SERPL CREATININE-BSD FRML MDRD: >90 ML/MIN/1.73M2
GLUCOSE SERPL-MCNC: 90 MG/DL (ref 70–99)
HBA1C MFR BLD: 5.8 %
HCT VFR BLD AUTO: 49.5 % (ref 40–53)
HDLC SERPL-MCNC: 52 MG/DL
HGB BLD-MCNC: 15.7 G/DL (ref 13.3–17.7)
LDLC SERPL CALC-MCNC: 130 MG/DL
MCH RBC QN AUTO: 27.5 PG (ref 26.5–33)
MCHC RBC AUTO-ENTMCNC: 31.7 G/DL (ref 31.5–36.5)
MCV RBC AUTO: 87 FL (ref 78–100)
MICROALBUMIN UR-MCNC: 12.3 MG/L
MICROALBUMIN/CREAT UR: 6.18 MG/G CR (ref 0–17)
NONHDLC SERPL-MCNC: 141 MG/DL
PLATELET # BLD AUTO: 342 10E3/UL (ref 150–450)
POTASSIUM SERPL-SCNC: 4.3 MMOL/L (ref 3.4–5.3)
PROT SERPL-MCNC: 6.8 G/DL (ref 6.4–8.3)
PSA SERPL-MCNC: 2.3 NG/ML (ref 0–3.5)
RBC # BLD AUTO: 5.71 10E6/UL (ref 4.4–5.9)
SODIUM SERPL-SCNC: 143 MMOL/L (ref 136–145)
TRIGL SERPL-MCNC: 55 MG/DL
TSH SERPL DL<=0.005 MIU/L-ACNC: 1.34 UIU/ML (ref 0.3–4.2)
URATE SERPL-MCNC: 7.2 MG/DL (ref 3.4–7)
WBC # BLD AUTO: 14.1 10E3/UL (ref 4–11)

## 2023-01-19 PROCEDURE — 90715 TDAP VACCINE 7 YRS/> IM: CPT | Performed by: NURSE PRACTITIONER

## 2023-01-19 PROCEDURE — 90472 IMMUNIZATION ADMIN EACH ADD: CPT | Performed by: NURSE PRACTITIONER

## 2023-01-19 PROCEDURE — 99396 PREV VISIT EST AGE 40-64: CPT | Mod: 25 | Performed by: NURSE PRACTITIONER

## 2023-01-19 PROCEDURE — 82570 ASSAY OF URINE CREATININE: CPT | Performed by: NURSE PRACTITIONER

## 2023-01-19 PROCEDURE — 83036 HEMOGLOBIN GLYCOSYLATED A1C: CPT | Performed by: NURSE PRACTITIONER

## 2023-01-19 PROCEDURE — 84443 ASSAY THYROID STIM HORMONE: CPT | Performed by: PATHOLOGY

## 2023-01-19 PROCEDURE — 80053 COMPREHEN METABOLIC PANEL: CPT | Performed by: PATHOLOGY

## 2023-01-19 PROCEDURE — 90471 IMMUNIZATION ADMIN: CPT | Performed by: NURSE PRACTITIONER

## 2023-01-19 PROCEDURE — 36415 COLL VENOUS BLD VENIPUNCTURE: CPT | Performed by: PATHOLOGY

## 2023-01-19 PROCEDURE — 85027 COMPLETE CBC AUTOMATED: CPT | Performed by: PATHOLOGY

## 2023-01-19 PROCEDURE — 90686 IIV4 VACC NO PRSV 0.5 ML IM: CPT | Performed by: NURSE PRACTITIONER

## 2023-01-19 PROCEDURE — 80061 LIPID PANEL: CPT | Performed by: PATHOLOGY

## 2023-01-19 PROCEDURE — G0103 PSA SCREENING: HCPCS | Performed by: PATHOLOGY

## 2023-01-19 PROCEDURE — 84550 ASSAY OF BLOOD/URIC ACID: CPT | Performed by: NURSE PRACTITIONER

## 2023-01-19 RX ORDER — ALBUTEROL SULFATE 0.83 MG/ML
2.5 SOLUTION RESPIRATORY (INHALATION) EVERY 6 HOURS PRN
Qty: 60 ML | Refills: 2 | Status: SHIPPED | OUTPATIENT
Start: 2023-01-19 | End: 2024-04-08

## 2023-01-19 RX ORDER — ALBUTEROL SULFATE 90 UG/1
2 AEROSOL, METERED RESPIRATORY (INHALATION) EVERY 6 HOURS PRN
Qty: 8.5 G | Refills: 3 | Status: SHIPPED | OUTPATIENT
Start: 2023-01-19 | End: 2024-04-08

## 2023-01-19 RX ORDER — ALLOPURINOL 100 MG/1
100 TABLET ORAL DAILY
Qty: 90 TABLET | Refills: 3 | Status: SHIPPED | OUTPATIENT
Start: 2023-01-19

## 2023-01-19 RX ORDER — IBUPROFEN 600 MG/1
600 TABLET, FILM COATED ORAL EVERY 6 HOURS PRN
Qty: 120 TABLET | Refills: 1 | Status: SHIPPED | OUTPATIENT
Start: 2023-01-19

## 2023-01-19 RX ORDER — ACETAMINOPHEN 500 MG
500-1000 TABLET ORAL EVERY 6 HOURS PRN
Qty: 100 TABLET | Refills: 3 | Status: SHIPPED | OUTPATIENT
Start: 2023-01-19 | End: 2024-04-08

## 2023-01-19 NOTE — LETTER
[facility]   [employer] [place]  Phone: [phone]      2023      Bill Wisdom  [address]   [place] [address]        Dear Bill,    Thank you for your interest in becoming a [employer] user!    Your access code is: Activation code not generated  Current [employer] Status: Active     Please access the [employer] website at [email or web address]. Below the ID and password fields, select the  Sign Up Now  as New User. You will be prompted to enter the access code listed above as well as additional personal information. Please follow the directions carefully when creating your username and password.    If you allow your access code to ,or if you have any questions call the [employer] Support line at [phone].    Sincerely,    [facility]  [facility]

## 2023-01-19 NOTE — NURSING NOTE
Bill Wisdom is a 52 year old male patient that presents today in clinic for the following:    Chief Complaint   Patient presents with     Physical     Medication renewal     The patient's allergies and medications were reviewed as noted. A set of vitals were recorded as noted without incident. The patient does not have any other questions for the provider.    Mariano Jarrett, EMT at 5:22 PM on 1/19/2023

## 2023-01-19 NOTE — PROGRESS NOTES
S: Bill Wisdom is a 52 year old male here for an annual exam. He has moved to [place] in order to obtain housing assistance w/in the year.      He states he is on disability.  He smokes a small amount of marijuana for pain control and is interested in obtaining a marijuana card to obtain it legally.            Patient Active Problem List   Diagnosis     CARDIOVASCULAR SCREENING; LDL GOAL LESS THAN 160     Family history of arteriosclerotic cardiovascular disease     Family history of diabetes mellitus     Spermatocele     Obesity     Esophageal reflux     Simple chronic bronchitis (H)     Neck pain     Right-sided low back pain with right-sided sciatica     CHANDLER (obstructive sleep apnea)     Morbid obesity (H)     Diabetes mellitus, type 2 (H)     Pulmonary nodules          Past Medical History:   Diagnosis Date     COPD (chronic obstructive pulmonary disease) (H)      Gastro-oesophageal reflux disease      History of tobacco abuse      Obese      CHANDLER (obstructive sleep apnea)      Prediabetes             Past Surgical History:   Procedure Laterality Date     HYDROCELECTOMY SCROTAL       INJECT SACROILIAC JOINT Right 2019    Procedure: Right Sacroiliac Joint Injection;  Surgeon: Sina Laboy MD;  Location: [facility] OR     SPERMATOCELECTOMY  3/20/2012    Procedure:SPERMATOCELECTOMY; Left Spermatocelectomy; Surgeon:DELLA CROW; Location: OR            Social History     Tobacco Use     Smoking status: Former     Packs/day: 0.00     Types: Cigarettes     Quit date: 2019     Years since quittin.0     Smokeless tobacco: Never   Substance Use Topics     Alcohol use: No     Comment: quit drinking 2019            Family History   Problem Relation Age of Onset     C.A.D. Mother         triple bypass     Hypertension Father      Cerebrovascular Disease Father      Alcohol/Drug Sister              No Known Allergies         Current Outpatient Medications   Medication Sig Dispense Refill      acetaminophen (TYLENOL) 500 MG tablet Take 1-2 tablets (500-1,000 mg) by mouth every 8 hours as needed for mild pain (do not exceed 3000mg per day) 60 tablet 1     acetaminophen (TYLENOL) 500 MG tablet Take 1-2 tablets (500-1,000 mg) by mouth every 6 hours as needed for mild pain or fever 90 tablet 1     albuterol (PROVENTIL HFA) 108 (90 Base) MCG/ACT inhaler Inhale 2 puffs into the lungs every 6 hours as needed for shortness of breath / dyspnea or wheezing 8.5 g 3     albuterol (PROVENTIL) (2.5 MG/3ML) 0.083% neb solution Take 1 vial (2.5 mg) by nebulization every 6 hours as needed for shortness of breath / dyspnea or wheezing 60 mL 2     allopurinol (ZYLOPRIM) 100 MG tablet Take 1 tablet (100 mg) by mouth daily 90 tablet 3     Cholecalciferol (VITAMIN D3) 75 MCG (3000 UT) TABS Take 1 tablet by mouth daily 90 tablet 3     cyclobenzaprine (FLEXERIL) 10 MG tablet Take 1 tablet (10 mg) by mouth 3 times daily as needed for muscle spasms 20 tablet 0     ibuprofen (ADVIL/MOTRIN) 600 MG tablet Take 1 tablet (600 mg) by mouth every 6 hours as needed for moderate pain 120 tablet 1     Multiple Vitamins-Minerals (MULTIVITAMIN ADULT) CHEW Take 1 chew tab by mouth daily 60 tablet 5     naproxen (NAPROSYN) 500 MG tablet Take 1 tablet (500 mg) by mouth 2 times daily as needed for moderate pain 30 tablet 1     order for DME Equipment being ordered: Nebulizer and equipment 1 each 0     order for DME Equipment being ordered: Cane 1 Device 0     oxyCODONE (ROXICODONE) 5 MG tablet Take 1 tablet (5 mg) by mouth every 6 hours as needed for pain 12 tablet 0     tiZANidine (ZANAFLEX) 4 MG capsule Take 1 capsule (4 mg) by mouth 2 times daily as needed for muscle spasms 30 capsule 1     topiramate (TOPAMAX) 100 MG tablet Take 1 tablet (100 mg) by mouth daily (with dinner) 30 tablet 3     traZODone (DESYREL) 100 MG tablet Take 100 mg by mouth At Bedtime         REVIEW OF SYSTEMS:  See above.    Answers for HPI/ROS submitted by the patient  on 1/19/2023  General Symptoms: No  Skin Symptoms: No  HENT Symptoms: No  EYE SYMPTOMS: No  HEART SYMPTOMS: No  LUNG SYMPTOMS: No  INTESTINAL SYMPTOMS: No  URINARY SYMPTOMS: No  REPRODUCTIVE SYMPTOMS: No  SKELETAL SYMPTOMS: No  BLOOD SYMPTOMS: No  NERVOUS SYSTEM SYMPTOMS: No  MENTAL HEALTH SYMPTOMS: No    O:   There were no vitals taken for this visit.  GENERAL APPEARANCE: healthy, alert and no distress  EYES: PERRL, sclera white, conjunctiva normal.  HENT: ear canals and TM's normal and mouth without ulcers or lesions  RESP: lungs clear to auscultation - no rales, rhonchi or wheezes  CV: regular rates and rhythm, normal S1 S2, no S3 or S4 and no murmur, click or rub   ABDOMEN:  soft, nontender, no HSM or masses and bowel sounds normal  NEURO: alert and oriented.  Good historian.  MUSK: ambulatory.  SKIN: normal.   LYMPH: neg cervical, supra and infraclavicular nodes.  EXT: warm.  Edema : trace.   PSYCHE: normal.     The 10-year ASCVD risk score (Noam DK, et al., 2019) is: 10.7%    Values used to calculate the score:      Age: 52 years      Sex: Male      Is Non- : No      Diabetic: Yes      Tobacco smoker: No      Systolic Blood Pressure: 128 mmHg      Is BP treated: No      HDL Cholesterol: 45 mg/dL      Total Cholesterol: 203 mg/dL    A/P:  Bill was seen today for physical.    Diagnoses and all orders for this visit:    Needs flu shot  -     INFLUENZA VACCINE >6 MONTHS (AFLURIA/FLUZONE)  -     TDAP (ADACEL,BOOSTRIX)    Neck pain  -     acetaminophen (TYLENOL) 500 MG tablet; Take 1-2 tablets (500-1,000 mg) by mouth every 6 hours as needed for mild pain or fever    Chronic bilateral low back pain with right-sided sciatica  -     acetaminophen (TYLENOL) 500 MG tablet; Take 1-2 tablets (500-1,000 mg) by mouth every 6 hours as needed for mild pain or fever  -     ibuprofen (ADVIL/MOTRIN) 600 MG tablet; Take 1 tablet (600 mg) by mouth every 6 hours as needed for moderate pain  (4-6)    Simple chronic bronchitis (H)  -     albuterol (PROVENTIL HFA) 108 (90 Base) MCG/ACT inhaler; Inhale 2 puffs into the lungs every 6 hours as needed for shortness of breath or wheezing    Gout, arthropathy  -     Uric acid; Future  -     allopurinol (ZYLOPRIM) 100 MG tablet; Take 1 tablet (100 mg) by mouth daily    Sacroiliac joint dysfunction of right side  -     ibuprofen (ADVIL/MOTRIN) 600 MG tablet; Take 1 tablet (600 mg) by mouth every 6 hours as needed for moderate pain (4-6)    Need for Tdap vaccination    Type 2 diabetes mellitus without complication, without long-term current use of insulin (H)  -     Comprehensive metabolic panel; Future  -     Albumin Random Urine Quantitative with Creat Ratio; Future  -     Hemoglobin A1c; Future    Screening for prostate cancer  -     PSA, screen; Future    Screening for hyperlipidemia  -     Lipid panel reflex to direct LDL Fasting; Future    Screening for thyroid disorder  -     TSH; Future    Screening for deficiency anemia  -     CBC with platelets; Future    Chronic obstructive pulmonary disease, unspecified COPD type (H)  -     albuterol (PROVENTIL) (2.5 MG/3ML) 0.083% neb solution; Take 1 vial (2.5 mg) by nebulization every 6 hours as needed for shortness of breath or wheezing      The patient voiced understanding of the information discussed and all questions were answered.     I spent a total of 40 minutes in the care of this pt during today's office visit. This time includes reviewing the patient's chart and prior history, obtaining a history, performing an examination and evaluation and counseling the patient. This time also includes ordering medications or tests necessary in addition to communication to other member's of the patient's health care team. Time spent in documentation and care coordination is included.     Lorie Zavala APRZAKIYA, CNP

## 2023-04-29 ENCOUNTER — HEALTH MAINTENANCE LETTER (OUTPATIENT)
Age: 53
End: 2023-04-29

## 2023-09-22 ENCOUNTER — TELEPHONE (OUTPATIENT)
Dept: INTERNAL MEDICINE | Facility: CLINIC | Age: 53
End: 2023-09-22
Payer: COMMERCIAL

## 2023-09-30 ENCOUNTER — HEALTH MAINTENANCE LETTER (OUTPATIENT)
Age: 53
End: 2023-09-30

## 2024-02-17 ENCOUNTER — HEALTH MAINTENANCE LETTER (OUTPATIENT)
Age: 54
End: 2024-02-17

## 2024-04-08 ENCOUNTER — MYC REFILL (OUTPATIENT)
Dept: INTERNAL MEDICINE | Facility: CLINIC | Age: 54
End: 2024-04-08
Payer: COMMERCIAL

## 2024-04-08 DIAGNOSIS — M54.41 CHRONIC BILATERAL LOW BACK PAIN WITH RIGHT-SIDED SCIATICA: ICD-10-CM

## 2024-04-08 DIAGNOSIS — J44.9 CHRONIC OBSTRUCTIVE PULMONARY DISEASE, UNSPECIFIED COPD TYPE (H): ICD-10-CM

## 2024-04-08 DIAGNOSIS — J41.0 SIMPLE CHRONIC BRONCHITIS (H): ICD-10-CM

## 2024-04-08 DIAGNOSIS — M10.9 GOUT, ARTHROPATHY: ICD-10-CM

## 2024-04-08 DIAGNOSIS — M54.2 NECK PAIN: ICD-10-CM

## 2024-04-08 DIAGNOSIS — M53.3 SACROILIAC JOINT DYSFUNCTION OF RIGHT SIDE: ICD-10-CM

## 2024-04-08 DIAGNOSIS — G89.29 CHRONIC BILATERAL LOW BACK PAIN WITH RIGHT-SIDED SCIATICA: ICD-10-CM

## 2024-04-09 RX ORDER — ALBUTEROL SULFATE 90 UG/1
2 AEROSOL, METERED RESPIRATORY (INHALATION) EVERY 6 HOURS PRN
Qty: 8.5 G | Refills: 0 | Status: SHIPPED | OUTPATIENT
Start: 2024-04-09

## 2024-04-09 RX ORDER — ALBUTEROL SULFATE 0.83 MG/ML
2.5 SOLUTION RESPIRATORY (INHALATION) EVERY 6 HOURS PRN
Qty: 60 ML | Refills: 0 | Status: SHIPPED | OUTPATIENT
Start: 2024-04-09

## 2024-04-09 RX ORDER — ACETAMINOPHEN 500 MG
500-1000 TABLET ORAL EVERY 6 HOURS PRN
Qty: 100 TABLET | Refills: 0 | Status: SHIPPED | OUTPATIENT
Start: 2024-04-09

## 2024-04-09 NOTE — TELEPHONE ENCOUNTER
1. allopurinol (ZYLOPRIM) 100 MG tablet   Last Written Prescription Date:  1/19/2023  Last Fill Quantity: 90,   # refills: 3  Routing allopurinol (ZYLOPRIM) 100 MG tablet refill request to provider for review/approval because: Failed medication protocol - labs  - >12 months last CBC, ALT, Uric Acid and value <6, normal Creatinine, normal GFR    2.  ibuprofen (ADVIL/MOTRIN) 600 MG tablet   Last Written Prescription Date:  1/19/2023  Last Fill Quantity: 120,   # refills: 1   Routing  ibuprofen (ADVIL/MOTRIN) 600 MG tablet refill request to provider for review/approval because: Failed medication protocol - labs  - >12 months last CBC, ALT, AST, normal GFR     3. acetaminophen (TYLENOL) 500 MG tablet  Last Written Prescription Date:  1/19/2023  Last Fill Quantity: 100,   # refills: 3  4. albuterol (PROVENTIL HFA) 108 (90 Base) MCG/ACT inhaler   Last Written Prescription Date: 1/19/2023  Last Fill Quantity: 8.5 g inhaler   # refills: 3  5. albuterol (PROVENTIL) (2.5 MG/3ML) 0.083% neb solution  Last Written Prescription Date: 1/19/2023  Last Fill Quantity: 60 ml   # refills: 1  Appointment past due: Fernandez refills of Tylenol, albuterol inhaler, albuterol neb solution sent.  - >12 month but <18 months last visit and no prior fernandez refills  - message was sent to [facility] clinic scheduling today.  - FYI to provider    Last Office Visit : 1/19/2023  Future Office visit:  none     CBC RESULTS:   Recent Labs   Lab Test 01/19/23 1847   WBC 14.1*   RBC 5.71   HGB 15.7   HCT 49.5   MCV 87   MCH 27.5   MCHC 31.7   RDW 13.9        Liver Function Studies -   Recent Labs   Lab Test 01/19/23 1847   PROTTOTAL 6.8   ALBUMIN 4.4   BILITOTAL 0.4   ALKPHOS 68   AST 18   ALT 22      Uric Acid   Date Value Ref Range Status   01/19/2023 7.2 (H) 3.4 - 7.0 mg/dL Final   07/22/2019 7.5 (H) 3.5 - 7.2 mg/dL Final         Creatinine   Date Value Ref Range Status   01/19/2023 0.95 0.67 - 1.17 mg/dL Final   10/02/2019 0.84 0.66 - 1.25 mg/dL Final         GFR Estimate If Black   Date Value Ref Range Status   10/02/2019 >90 >60 mL/min/[1.73_m2] Final     Comment:      GFR Calc  Starting 12/18/2018, serum creatinine based estimated GFR (eGFR) will be   calculated using the Chronic Kidney Disease Epidemiology Collaboration   (CKD-EPI) equation.

## 2024-04-10 RX ORDER — ALLOPURINOL 100 MG/1
100 TABLET ORAL DAILY
Qty: 90 TABLET | Refills: 3 | OUTPATIENT
Start: 2024-04-10

## 2024-04-10 RX ORDER — IBUPROFEN 600 MG/1
600 TABLET, FILM COATED ORAL EVERY 6 HOURS PRN
Qty: 120 TABLET | Refills: 1 | OUTPATIENT
Start: 2024-04-10

## 2024-04-27 ENCOUNTER — HEALTH MAINTENANCE LETTER (OUTPATIENT)
Age: 54
End: 2024-04-27

## 2024-07-06 ENCOUNTER — HEALTH MAINTENANCE LETTER (OUTPATIENT)
Age: 54
End: 2024-07-06

## 2024-08-06 ENCOUNTER — ANCILLARY PROCEDURE (OUTPATIENT)
Dept: ULTRASOUND IMAGING | Facility: CLINIC | Age: 54
End: 2024-08-06
Attending: INTERNAL MEDICINE
Payer: COMMERCIAL

## 2024-08-06 ENCOUNTER — LAB (OUTPATIENT)
Dept: LAB | Facility: CLINIC | Age: 54
End: 2024-08-06
Payer: COMMERCIAL

## 2024-08-06 ENCOUNTER — OFFICE VISIT (OUTPATIENT)
Dept: INTERNAL MEDICINE | Facility: CLINIC | Age: 54
End: 2024-08-06
Payer: COMMERCIAL

## 2024-08-06 VITALS
WEIGHT: 241.3 LBS | HEART RATE: 87 BPM | OXYGEN SATURATION: 96 % | BODY MASS INDEX: 38.97 KG/M2 | DIASTOLIC BLOOD PRESSURE: 77 MMHG | TEMPERATURE: 99.4 F | SYSTOLIC BLOOD PRESSURE: 132 MMHG

## 2024-08-06 DIAGNOSIS — M54.50 ACUTE MIDLINE LOW BACK PAIN WITHOUT SCIATICA: ICD-10-CM

## 2024-08-06 DIAGNOSIS — N50.812 TESTICULAR PAIN, LEFT: ICD-10-CM

## 2024-08-06 DIAGNOSIS — N50.812 TESTICULAR PAIN, LEFT: Primary | ICD-10-CM

## 2024-08-06 DIAGNOSIS — N45.3 ORCHITIS AND EPIDIDYMITIS: ICD-10-CM

## 2024-08-06 LAB
BASOPHILS # BLD AUTO: 0.1 10E3/UL (ref 0–0.2)
BASOPHILS NFR BLD AUTO: 1 %
C TRACH DNA SPEC QL NAA+PROBE: NEGATIVE
CRP SERPL-MCNC: 19.1 MG/L
EOSINOPHIL # BLD AUTO: 0.2 10E3/UL (ref 0–0.7)
EOSINOPHIL NFR BLD AUTO: 2 %
ERYTHROCYTE [DISTWIDTH] IN BLOOD BY AUTOMATED COUNT: 13.5 % (ref 10–15)
ERYTHROCYTE [SEDIMENTATION RATE] IN BLOOD BY WESTERGREN METHOD: 10 MM/HR (ref 0–20)
HCT VFR BLD AUTO: 44.5 % (ref 40–53)
HGB BLD-MCNC: 14.9 G/DL (ref 13.3–17.7)
IMM GRANULOCYTES # BLD: 0.1 10E3/UL
IMM GRANULOCYTES NFR BLD: 1 %
LYMPHOCYTES # BLD AUTO: 1.3 10E3/UL (ref 0.8–5.3)
LYMPHOCYTES NFR BLD AUTO: 10 %
MCH RBC QN AUTO: 28.3 PG (ref 26.5–33)
MCHC RBC AUTO-ENTMCNC: 33.5 G/DL (ref 31.5–36.5)
MCV RBC AUTO: 84 FL (ref 78–100)
MONOCYTES # BLD AUTO: 1.1 10E3/UL (ref 0–1.3)
MONOCYTES NFR BLD AUTO: 9 %
N GONORRHOEA DNA SPEC QL NAA+PROBE: NEGATIVE
NEUTROPHILS # BLD AUTO: 10.2 10E3/UL (ref 1.6–8.3)
NEUTROPHILS NFR BLD AUTO: 77 %
NRBC # BLD AUTO: 0 10E3/UL
NRBC BLD AUTO-RTO: 0 /100
PLATELET # BLD AUTO: 302 10E3/UL (ref 150–450)
RBC # BLD AUTO: 5.27 10E6/UL (ref 4.4–5.9)
WBC # BLD AUTO: 13 10E3/UL (ref 4–11)

## 2024-08-06 PROCEDURE — 86140 C-REACTIVE PROTEIN: CPT | Performed by: PATHOLOGY

## 2024-08-06 PROCEDURE — 93976 VASCULAR STUDY: CPT | Mod: GC | Performed by: RADIOLOGY

## 2024-08-06 PROCEDURE — 87591 N.GONORRHOEAE DNA AMP PROB: CPT | Performed by: INTERNAL MEDICINE

## 2024-08-06 PROCEDURE — 36415 COLL VENOUS BLD VENIPUNCTURE: CPT | Performed by: PATHOLOGY

## 2024-08-06 PROCEDURE — 85652 RBC SED RATE AUTOMATED: CPT | Performed by: PATHOLOGY

## 2024-08-06 PROCEDURE — 99000 SPECIMEN HANDLING OFFICE-LAB: CPT | Performed by: PATHOLOGY

## 2024-08-06 PROCEDURE — 99214 OFFICE O/P EST MOD 30 MIN: CPT | Mod: GE

## 2024-08-06 PROCEDURE — 87491 CHLMYD TRACH DNA AMP PROBE: CPT | Performed by: INTERNAL MEDICINE

## 2024-08-06 PROCEDURE — 76870 US EXAM SCROTUM: CPT | Mod: GC | Performed by: RADIOLOGY

## 2024-08-06 PROCEDURE — 85025 COMPLETE CBC W/AUTO DIFF WBC: CPT | Performed by: PATHOLOGY

## 2024-08-06 RX ORDER — NAPROXEN 500 MG/1
500 TABLET ORAL 2 TIMES DAILY PRN
Qty: 60 TABLET | Refills: 0 | Status: SHIPPED | OUTPATIENT
Start: 2024-08-06 | End: 2024-09-05

## 2024-08-06 RX ORDER — SULFAMETHOXAZOLE AND TRIMETHOPRIM 400; 80 MG/1; MG/1
1 TABLET ORAL 2 TIMES DAILY
Qty: 20 TABLET | Refills: 0 | Status: SHIPPED | OUTPATIENT
Start: 2024-08-06 | End: 2024-08-06 | Stop reason: DRUGHIGH

## 2024-08-06 RX ORDER — SULFAMETHOXAZOLE/TRIMETHOPRIM 800-160 MG
1 TABLET ORAL 2 TIMES DAILY
Qty: 20 TABLET | Refills: 0 | Status: SHIPPED | OUTPATIENT
Start: 2024-08-06 | End: 2024-08-16

## 2024-08-06 NOTE — Clinical Note
8/6/2024       RE: Bill Wisdom  [address] [facility] [address] [facility]  [place] [address]     Dear Colleague,    Thank you for referring your patient, Bill Wisdom, to the [facility] INTERNAL MEDICINE [place] at [facility]. Please see a copy of my visit note below.    Not all questionnaires were able to be completed at this visit. Pt declined  Assessment & Plan    Testicular pain, left  Presented with new onset testicular pain following excessive weight lifting at work. Left scrotum enlarged compared to right at baseline due to history of spermatocele; however unclear of baseline. Tender to touch. Differential currently includes: testicular torsion, orchitis, epididymitis.   - US Testicular & Scrotum w Doppler Ltd; Future  - CBC with platelets and differential; Future  - ESR: Erythrocyte sedimentation rate; Future  - CRP, inflammation; Future  - NEISSERIA GONORRHOEA PCR; Future  - CHLAMYDIA TRACHOMATIS PCR; Future  - Plan to have patient return to clinic once the results for these tests have been completed; next steps in management depending on lab/imaging findings    Examined independently and care plan discussed with attending provider, Dr. Claus Carrero.     Rolando Cortez M.D.  Internal Medicine  PGY-2   [facility]      MED REC REQUIRED  Post Medication Reconciliation Status:       See Patient Instructions    Return in about 1 week (around 8/13/2024).    Becca Reed is a 54 year old, presenting for the following health issues:  ER F/U (Pt here to follow up on ED visit 7/24/2024; discuss low back pain, testicular/groin pain; was lifting at milk crates work)      8/6/2024     7:55 AM   Additional Questions   Roomed by Lisset HIGH     History of Present Illness    Bill Wisdom is a 54-year-old male, with left-side spermatocele, CHANDLER, GERD, T2DM, and a recent ED visit for the lower back pain, who presented to clinic  today for follow-up on his recent ED visit.    Patient went to [facility]'s ED on 07/24 after having a sudden onset of back pain and testicular pain while at work. Patient was lifting between 10-13 cartoons of milk at once when he suddenly developed sharp, shooting pain in his lower back and groin. While in the ED, he had a lumber XR done showing some chronic degenerative disease but no acute fractures. Treated with non-opioid pharmacologic agents with improvement in pain. Per ED note, patient did not have any alarming symptoms warranting any further imaging or hospitalization; however, there were no comments made regarding the patient's testicular/scrotal pain.    Post-discharge, patient reports that his testicular pain progressively worsened; exacerbated by any form of movement. He denotes difficulty with urination and difficulty starting a stream. Endorses some lack of bladder control but no loss of bowel control. No gross blood in the urine. No difficulty with erections but they are still painful. Endorses some mild numbness and weakness in his lateral calf muscles. No fevers, chills, abdominal pain, discoloration to the scrotal skin.       Review of Systems  Constitutional, HEENT, cardiovascular, pulmonary, gi and gu systems are negative, except as otherwise noted.      Objective    /77 (BP Location: Right arm, Patient Position: Sitting, Cuff Size: Adult Large)   Pulse 87   Temp 99.4  F (37.4  C) (Oral)   Wt 109.5 kg (241 lb 4.8 oz)   SpO2 96%   BMI 38.97 kg/m    Body mass index is 38.97 kg/m .    Physical Exam   GENERAL: alert and no distress  NECK: no adenopathy, no asymmetry, masses, or scars  RESP: lungs clear to auscultation - no rales, rhonchi or wheezes  CV: regular rate and rhythm, normal S1 S2, no S3 or S4, no murmur, click or rub, no peripheral edema  ABDOMEN: soft, nontender, no hepatosplenomegaly, no masses and bowel sounds normal  MS: no gross musculoskeletal defects noted, no edema  :  Left scrotum enlarged compared to right (unclear of baseline), left testicle tender to touch and epididymidis. Unable to properly elicit the Cremasteric reflex.  Neuro: 4/5 strength in lower extremities, normal sensation through, gait disturbed due to testicular pain    Lab on 01/19/2023   Component Date Value Ref Range Status    WBC Count 01/19/2023 14.1 (H)  4.0 - 11.0 10e3/uL Final    RBC Count 01/19/2023 5.71  4.40 - 5.90 10e6/uL Final    Hemoglobin 01/19/2023 15.7  13.3 - 17.7 g/dL Final    Hematocrit 01/19/2023 49.5  40.0 - 53.0 % Final    MCV 01/19/2023 87  78 - 100 fL Final    MCH 01/19/2023 27.5  26.5 - 33.0 pg Final    MCHC 01/19/2023 31.7  31.5 - 36.5 g/dL Final    RDW 01/19/2023 13.9  10.0 - 15.0 % Final    Platelet Count 01/19/2023 342  150 - 450 10e3/uL Final    TSH 01/19/2023 1.34  0.30 - 4.20 uIU/mL Final    Cholesterol 01/19/2023 193  <200 mg/dL Final    Triglycerides 01/19/2023 55  <150 mg/dL Final    Direct Measure HDL 01/19/2023 52  >=40 mg/dL Final    LDL Cholesterol Calculated 01/19/2023 130 (H)  <=100 mg/dL Final    Non HDL Cholesterol 01/19/2023 141 (H)  <130 mg/dL Final    Sodium 01/19/2023 143  136 - 145 mmol/L Final    Potassium 01/19/2023 4.3  3.4 - 5.3 mmol/L Final    Chloride 01/19/2023 106  98 - 107 mmol/L Final    Carbon Dioxide (CO2) 01/19/2023 27  22 - 29 mmol/L Final    Anion Gap 01/19/2023 10  7 - 15 mmol/L Final    Urea Nitrogen 01/19/2023 16.9  6.0 - 20.0 mg/dL Final    Creatinine 01/19/2023 0.95  0.67 - 1.17 mg/dL Final    Calcium 01/19/2023 9.6  8.6 - 10.0 mg/dL Final    Glucose 01/19/2023 90  70 - 99 mg/dL Final    Alkaline Phosphatase 01/19/2023 68  40 - 129 U/L Final    AST 01/19/2023 18  10 - 50 U/L Final    ALT 01/19/2023 22  10 - 50 U/L Final    Protein Total 01/19/2023 6.8  6.4 - 8.3 g/dL Final    Albumin 01/19/2023 4.4  3.5 - 5.2 g/dL Final    Bilirubin Total 01/19/2023 0.4  <=1.2 mg/dL Final    GFR Estimate 01/19/2023 >90  >60 mL/min/1.73m2 Final    Effective December  21, 2021 eGFRcr in adults is calculated using the 2021 CKD-EPI creatinine equation which includes age and gender (Veena et al., NE, DOI: 10.1056/DLHMbb7282235)    Albumin Urine mg/L 01/19/2023 12.3  mg/L Final    The reference ranges have not been established in urine albumin. The results should be integrated into the clinical context for interpretation.    Albumin Urine mg/g Cr 01/19/2023 6.18  0.00 - 17.00 mg/g Cr Final    Microalbuminuria is defined as an albumin:creatinine ratio of 17 to 299 for males and 25 to 299 for females. A ratio of albumin:creatinine of 300 or higher is indicative of overt proteinuria.  Due to biologic variability, positive results should be confirmed by a second, first-morning random or 24-hour timed urine specimen. If there is discrepancy, a third specimen is recommended. When 2 out of 3 results are in the microalbuminuria range, this is evidence for incipient nephropathy and warrants increased efforts at glucose control, blood pressure control, and institution of therapy with an angiotensin-converting-enzyme (ACE) inhibitor (if the patient can tolerate it).      Creatinine Urine mg/dL 01/19/2023 199.0  mg/dL Final    The reference ranges have not been established in urine creatinine. The results should be integrated into the clinical context for interpretation.    Prostate Specific Antigen Screen 01/19/2023 2.30  0.00 - 3.50 ng/mL Final    Hemoglobin A1C 01/19/2023 5.8 (H)  <5.7 % Final    Normal <5.7%   Prediabetes 5.7-6.4%    Diabetes 6.5% or higher     Note: Adopted from ADA consensus guidelines.    Uric Acid 01/19/2023 7.2 (H)  3.4 - 7.0 mg/dL Final           Signed Electronically by: Rolando Cortez MD    While the patient was in clinic, I reviewed the pertinent medical history and results.  I discussed the current findings on physical examination, as well as the patient s diagnosis and treatment plan with the resident and agree with the information as documented with the following  exceptions: none.  Claus Carrero MD          Again, thank you for allowing me to participate in the care of your patient.      Sincerely,    Rolando Cortez MD

## 2024-08-06 NOTE — PROGRESS NOTES
Not all questionnaires were able to be completed at this visit. Pt declined  Assessment & Plan     Testicular pain, left  Presented with new onset testicular pain following excessive weight lifting at work. Left scrotum enlarged compared to right at baseline due to history of spermatocele; however unclear of baseline. Tender to touch. Differential currently includes: testicular torsion, orchitis, epididymitis.   - US Testicular & Scrotum w Doppler Ltd; Future  - CBC with platelets and differential; Future  - ESR: Erythrocyte sedimentation rate; Future  - CRP, inflammation; Future  - NEISSERIA GONORRHOEA PCR; Future  - CHLAMYDIA TRACHOMATIS PCR; Future  - Plan to have patient return to clinic once the results for these tests have been completed; next steps in management depending on lab/imaging findings    Examined independently and care plan discussed with attending provider, Dr. Claus Carrero.     Rolando Cortez M.D.  Internal Medicine  PGY-2   [facility]      MED REC REQUIRED  Post Medication Reconciliation Status:       See Patient Instructions    Return in about 1 week (around 8/13/2024).    Becca Reed is a 54 year old, presenting for the following health issues:  ER F/U (Pt here to follow up on ED visit 7/24/2024; discuss low back pain, testicular/groin pain; was lifting at [employer] cr[employer] work)      8/6/2024     7:55 AM   Additional Questions   Roomed by Lisset HIGH     History of Present Illness    Bill Wisdom is a 54-year-old male, with left-side spermatocele, CHANDLER, GERD, T2DM, and a recent ED visit for the lower back pain, who presented to clinic today for follow-up on his recent ED visit.    Patient went to [facility]'s ED on 07/24 after having a sudden onset of back pain and testicular pain while at work. Patient was lifting between 10-13 cartoons of milk at once when he suddenly developed sharp, shooting pain in his lower back and groin. While in the ED, he had a  lumber XR done showing some chronic degenerative disease but no acute fractures. Treated with non-opioid pharmacologic agents with improvement in pain. Per ED note, patient did not have any alarming symptoms warranting any further imaging or hospitalization; however, there were no comments made regarding the patient's testicular/scrotal pain.    Post-discharge, patient reports that his testicular pain progressively worsened; exacerbated by any form of movement. He denotes difficulty with urination and difficulty starting a stream. Endorses some lack of bladder control but no loss of bowel control. No gross blood in the urine. No difficulty with erections but they are still painful. Endorses some mild numbness and weakness in his lateral calf muscles. No fevers, chills, abdominal pain, discoloration to the scrotal skin.       Review of Systems  Constitutional, HEENT, cardiovascular, pulmonary, gi and gu systems are negative, except as otherwise noted.      Objective    /77 (BP Location: Right arm, Patient Position: Sitting, Cuff Size: Adult Large)   Pulse 87   Temp 99.4  F (37.4  C) (Oral)   Wt 109.5 kg (241 lb 4.8 oz)   SpO2 96%   BMI 38.97 kg/m    Body mass index is 38.97 kg/m .    Physical Exam   GENERAL: alert and no distress  NECK: no adenopathy, no asymmetry, masses, or scars  RESP: lungs clear to auscultation - no rales, rhonchi or wheezes  CV: regular rate and rhythm, normal S1 S2, no S3 or S4, no murmur, click or rub, no peripheral edema  ABDOMEN: soft, nontender, no hepatosplenomegaly, no masses and bowel sounds normal  MS: no gross musculoskeletal defects noted, no edema  : Left scrotum enlarged compared to right (unclear of baseline), left testicle tender to touch and epididymidis. Unable to properly elicit the Cremasteric reflex.  Neuro: 4/5 strength in lower extremities, normal sensation through, gait disturbed due to testicular pain    Lab on 01/19/2023   Component Date Value Ref Range  Status    WBC Count 01/19/2023 14.1 (H)  4.0 - 11.0 10e3/uL Final    RBC Count 01/19/2023 5.71  4.40 - 5.90 10e6/uL Final    Hemoglobin 01/19/2023 15.7  13.3 - 17.7 g/dL Final    Hematocrit 01/19/2023 49.5  40.0 - 53.0 % Final    MCV 01/19/2023 87  78 - 100 fL Final    MCH 01/19/2023 27.5  26.5 - 33.0 pg Final    MCHC 01/19/2023 31.7  31.5 - 36.5 g/dL Final    RDW 01/19/2023 13.9  10.0 - 15.0 % Final    Platelet Count 01/19/2023 342  150 - 450 10e3/uL Final    TSH 01/19/2023 1.34  0.30 - 4.20 uIU/mL Final    Cholesterol 01/19/2023 193  <200 mg/dL Final    Triglycerides 01/19/2023 55  <150 mg/dL Final    Direct Measure HDL 01/19/2023 52  >=40 mg/dL Final    LDL Cholesterol Calculated 01/19/2023 130 (H)  <=100 mg/dL Final    Non HDL Cholesterol 01/19/2023 141 (H)  <130 mg/dL Final    Sodium 01/19/2023 143  136 - 145 mmol/L Final    Potassium 01/19/2023 4.3  3.4 - 5.3 mmol/L Final    Chloride 01/19/2023 106  98 - 107 mmol/L Final    Carbon Dioxide (CO2) 01/19/2023 27  22 - 29 mmol/L Final    Anion Gap 01/19/2023 10  7 - 15 mmol/L Final    Urea Nitrogen 01/19/2023 16.9  6.0 - 20.0 mg/dL Final    Creatinine 01/19/2023 0.95  0.67 - 1.17 mg/dL Final    Calcium 01/19/2023 9.6  8.6 - 10.0 mg/dL Final    Glucose 01/19/2023 90  70 - 99 mg/dL Final    Alkaline Phosphatase 01/19/2023 68  40 - 129 U/L Final    AST 01/19/2023 18  10 - 50 U/L Final    ALT 01/19/2023 22  10 - 50 U/L Final    Protein Total 01/19/2023 6.8  6.4 - 8.3 g/dL Final    Albumin 01/19/2023 4.4  3.5 - 5.2 g/dL Final    Bilirubin Total 01/19/2023 0.4  <=1.2 mg/dL Final    GFR Estimate 01/19/2023 >90  >60 mL/min/1.73m2 Final    Effective December 21, 2021 eGFRcr in adults is calculated using the 2021 CKD-EPI creatinine equation which includes age and gender (Veena et al., NEJM, DOI: 10.1056/ZULTlp5168333)    Albumin Urine mg/L 01/19/2023 12.3  mg/L Final    The reference ranges have not been established in urine albumin. The results should be integrated into the  clinical context for interpretation.    Albumin Urine mg/g Cr 01/19/2023 6.18  0.00 - 17.00 mg/g Cr Final    Microalbuminuria is defined as an albumin:creatinine ratio of 17 to 299 for males and 25 to 299 for females. A ratio of albumin:creatinine of 300 or higher is indicative of overt proteinuria.  Due to biologic variability, positive results should be confirmed by a second, first-morning random or 24-hour timed urine specimen. If there is discrepancy, a third specimen is recommended. When 2 out of 3 results are in the microalbuminuria range, this is evidence for incipient nephropathy and warrants increased efforts at glucose control, blood pressure control, and institution of therapy with an angiotensin-converting-enzyme (ACE) inhibitor (if the patient can tolerate it).      Creatinine Urine mg/dL 01/19/2023 199.0  mg/dL Final    The reference ranges have not been established in urine creatinine. The results should be integrated into the clinical context for interpretation.    Prostate Specific Antigen Screen 01/19/2023 2.30  0.00 - 3.50 ng/mL Final    Hemoglobin A1C 01/19/2023 5.8 (H)  <5.7 % Final    Normal <5.7%   Prediabetes 5.7-6.4%    Diabetes 6.5% or higher     Note: Adopted from ADA consensus guidelines.    Uric Acid 01/19/2023 7.2 (H)  3.4 - 7.0 mg/dL Final           Signed Electronically by: Rolando Cortez MD    While the patient was in clinic, I reviewed the pertinent medical history and results.  I discussed the current findings on physical examination, as well as the patient s diagnosis and treatment plan with the resident and agree with the information as documented with the following exceptions: none.  Claus Carrero MD

## 2024-08-06 NOTE — PATIENT INSTRUCTIONS
Bill Wisdom, it was a pleasure seeing you in clinic today (August 6, 2024). To briefly summarize our plan moving forward:    1) We will be getting some blood work, urine labs, and an ultrasound study of your testicles. If the results are unremarkable, then we will reevaluate you thereafter to determine the next best course of action.    If you have any further questions or concerns, please call the clinic ([phone]) or send a message via the [facility] feature.   Thank you for entrusting me with your care, and I look forward to seeing you at your next clinic visit.    Rolando Cortez MD  Internal Medicine  PGY-1  [facility]

## 2024-08-06 NOTE — PROGRESS NOTES
Interval Follow-up:    Patient reassessed after completion of blood/urinary work and testicular ultrasound.  Initial blood work shows an elevated white cell count and elevated CRP consistent with an acute inflammatory process.  Review of testicular ultrasound shows adequate arterial and venous flow ruling out torsion however there were a few minimally lobulated septic lesions noted in the testes as well as epidermidis; discussed with radiology who was not fully convinced that there were underlying malignant etiology.  Additionally, the findings on the ultrasound was not overly convincing for an epididymitis or orchitis.     Given the patient's symptomatology and blood work concerning for an acute inflammatory process, plan to treat the patient for epididymitis/orchitis with a course of oral antibiotics.  Will reassess patient in a week for any clinical improvement.    Patient also requesting physical therapy for his ongoing back pain in addition to his nonopiate pharmacologic agents.      Orchitis and epididymitis  -     sulfamethoxazole-trimethoprim (BACTRIM DS) 800-160 MG tablet; Take 1 tablet by mouth 2 times daily for 10 days    Acute midline low back pain without sciatica  -     Physical Therapy  Referral; Future      Care plan discussed with attending provider, Dr. Claus Carrero.    Rolando Cortez M.D.  Internal Medicine  PGY-2   [facility]  While the patient was in clinic, I reviewed the pertinent medical history and results.  I discussed the current findings on physical examination, as well as the patient s diagnosis and treatment plan with the resident and agree with the information as documented with the following exceptions: none.

## 2024-08-06 NOTE — LETTER
[facility] INTERNAL MEDICINE [address] [address]  Phone: [phone]  Fax: [phone]    August 6, 2024        Bill Wisdom  [facility] [facility] [address]      To whom it may concern:    RE: Bill Wisdom    Patient was seen and treated today at our clinic. Given his various active medical conditions, please excuse the Bill from work for the remainder of this week. Patient may return to work on Monday (8/12/2024) with the following:  Light duty-unable to lift more than 10-15 pounds    Thank you for your understanding. Please contact me for questions or concerns.      Sincerely,      Rolando Cortez M.D.  Internal Medicine  PGY-2   [facility]

## 2024-08-08 ENCOUNTER — TELEPHONE (OUTPATIENT)
Dept: INTERNAL MEDICINE | Facility: CLINIC | Age: 54
End: 2024-08-08
Payer: COMMERCIAL

## 2024-08-12 ENCOUNTER — THERAPY VISIT (OUTPATIENT)
Dept: PHYSICAL THERAPY | Facility: CLINIC | Age: 54
End: 2024-08-12
Attending: INTERNAL MEDICINE
Payer: OTHER MISCELLANEOUS

## 2024-08-12 DIAGNOSIS — M54.50 ACUTE MIDLINE LOW BACK PAIN WITHOUT SCIATICA: ICD-10-CM

## 2024-08-12 PROCEDURE — 97161 PT EVAL LOW COMPLEX 20 MIN: CPT | Mod: GP

## 2024-08-12 PROCEDURE — 97530 THERAPEUTIC ACTIVITIES: CPT | Mod: GP

## 2024-08-12 PROCEDURE — 97110 THERAPEUTIC EXERCISES: CPT | Mod: GP

## 2024-08-12 NOTE — PROGRESS NOTES
"PHYSICAL THERAPY EVALUATION  Type of Visit: Evaluation              Subjective       Presenting condition or subjective complaint: back pain lower back pain into bilateral legs ongoing since injury at work lifting heavy on July 24th. Increased groin pain with injury as well. Reports increased symptoms with increased weight.   Date of onset: 08/06/24 (date of order)    Relevant medical history: COPD; Depression; Diabetes   Dates & types of surgery:      Prior diagnostic imaging/testing results: CT scan; X-ray     Prior therapy history for the same diagnosis, illness or injury:  PT for back pain that was helpful at the time       Prior Level of Function  Transfers:   Ambulation:   ADL:   IADL:     Living Environment  Social support: Alone   Type of home: House   Stairs to enter the home:         Ramp: No   Stairs inside the home: Yes 13     Help at home: Other  Equipment owned: [employer] Cane     Employment:      Hobbies/Interests:      Patient goals for therapy:  walk without pain     Pain assessment:      Objective   LUMBAR SPINE EVALUATION  PAIN: Pain Level at Rest: 4/10  Pain Level with Use: 8/10  Pain Location: lumbar spine, hip, and knee  Pain Quality: Aching and Shooting  Pain Frequency: constant  Pain is Worst: does not matter  Pain is Exacerbated By: certain movements, sitting  Pain is Relieved By: heat  Pain Progression: Unchanged  INTEGUMENTARY (edema, incisions):   POSTURE: Standing Posture: Rounded shoulders, Forward head, Thoracic kyphosis increased  Sitting Posture: Rounded shoulders, Forward head, Lordosis increased  GAIT:   Weightbearing Status: WBAT  Assistive Device(s): None  Gait Deviations: Antalgic  Minimal trunk rotation, guarded, increased base of support    ROM: Lumbar ROM: flexion - mid shins with mod pain, extension - mod limitation (\"hurts but feels good\"), Side glide - mod limitation bilaterally with pain bilaterally    STRENGTH:  Hip flexion - B 4/5, mod pain in LB  Seated ABD/ADD - B 5/5 "   Knee extension - B 5/5  Flexion - L 5/5, R 4+/5      NEURAL TENSION: slump (+) bilaterally     PELVIS/SI SPECIAL TESTS: Thigh thrust (+) bilaterally, pelvic compression and distraction (+)  FUNCTIONAL TESTS: Sit to stand: slow, bilateral UE assist, painful    Directional preference:   Prone on elbows and prone lying - increase bilateral symptoms with radiating symptoms from low back/pelvis up to neck      Assessment & Plan   CLINICAL IMPRESSIONS  Medical Diagnosis: Acute midline low back pain without sciatica.    Treatment Diagnosis: Low back pain   Impression/Assessment: Patient is a 54 year old male with low back pain, symptoms into bilateral lower extremities, reduced activity tolerance and ability to perform occupational tasks such as lifting complaints.  The following significant findings have been identified: Pain, Decreased ROM/flexibility, Decreased joint mobility, Decreased strength, Impaired gait, Impaired muscle performance, Decreased activity tolerance, and Impaired posture. These impairments interfere with their ability to perform self care tasks, work tasks, recreational activities, household chores, household mobility, and community mobility as compared to previous level of function.     Clinical Decision Making (Complexity):  Clinical Presentation: Stable/Uncomplicated  Clinical Presentation Rationale: based on medical and personal factors listed in PT evaluation  Clinical Decision Making (Complexity): Low complexity    PLAN OF CARE  Treatment Interventions:  Modalities: Cryotherapy, Dry Needling, Hot Pack, Mechanical Traction, Ultrasound  Interventions: Manual Therapy, Neuromuscular Re-education, Therapeutic Activity, Therapeutic Exercise, Self-Care/Home Management    Long Term Goals     PT Goal 1  Goal Identifier: Functional mobility  Goal Description: Patient will walk for at least 25 minutes without increase in symptoms or having to stop and rest in 12 weeks  Rationale: to maximize safety and  independence with performance of ADLs and functional tasks;to maximize safety and independence within the community;to maximize safety and independence with self cares  Target Date: 11/03/24  PT Goal 2  Goal Identifier: Low back pain  Goal Description: Patient will report <4/10 low back pain for at least 2 weeks without limitation in desired activities for at least 3 weeks  Rationale: to maximize safety and independence with performance of ADLs and functional tasks  Target Date: 11/03/24  PT Goal 3  Goal Identifier: Lifting  Goal Description: Patient will lift at least 15 pounds from floor to waist height for 10/10 trials without compensation or increased back pain in 12 weeks to perform occupational tasks  Rationale: to maximize safety and independence with performance of ADLs and functional tasks  Target Date: 11/03/24      Frequency of Treatment: 1x per week for 3 weeks, 1x per 2 weeks for 9 weeks, adjusting frequency as indicated by patient presentation  Duration of Treatment: 12 weeks    Recommended Referrals to Other Professionals:   Education Assessment:   Learner/Method: Patient    Risks and benefits of evaluation/treatment have been explained.   Patient/Family/caregiver agrees with Plan of Care.     Evaluation Time:     PT Eval, Low Complexity Minutes (18028): 20       Signing Clinician: Judy Shipley, PT             Retention Suture Bite Size: 3 mm

## 2024-08-29 ENCOUNTER — HOSPITAL ENCOUNTER (EMERGENCY)
Facility: CLINIC | Age: 54
Discharge: HOME | End: 2024-08-29
Attending: EMERGENCY MEDICINE | Admitting: EMERGENCY MEDICINE
Payer: OTHER MISCELLANEOUS

## 2024-08-29 ENCOUNTER — APPOINTMENT (OUTPATIENT)
Dept: ULTRASOUND IMAGING | Facility: CLINIC | Age: 54
End: 2024-08-29
Attending: EMERGENCY MEDICINE
Payer: OTHER MISCELLANEOUS

## 2024-08-29 ENCOUNTER — OFFICE VISIT (OUTPATIENT)
Dept: URGENT CARE | Facility: URGENT CARE | Age: 54
End: 2024-08-29
Payer: OTHER MISCELLANEOUS

## 2024-08-29 VITALS
WEIGHT: 245 LBS | HEART RATE: 68 BPM | RESPIRATION RATE: 18 BRPM | TEMPERATURE: 97.8 F | SYSTOLIC BLOOD PRESSURE: 137 MMHG | OXYGEN SATURATION: 98 % | DIASTOLIC BLOOD PRESSURE: 73 MMHG | BODY MASS INDEX: 39.56 KG/M2

## 2024-08-29 VITALS
RESPIRATION RATE: 18 BRPM | SYSTOLIC BLOOD PRESSURE: 108 MMHG | HEART RATE: 71 BPM | WEIGHT: 244 LBS | DIASTOLIC BLOOD PRESSURE: 69 MMHG | OXYGEN SATURATION: 97 % | BODY MASS INDEX: 39.4 KG/M2 | TEMPERATURE: 97.8 F

## 2024-08-29 DIAGNOSIS — N43.40 SPERMATOCELE: ICD-10-CM

## 2024-08-29 DIAGNOSIS — N50.812 TESTICULAR PAIN, LEFT: ICD-10-CM

## 2024-08-29 DIAGNOSIS — N50.812 PAIN IN LEFT TESTICLE: Primary | ICD-10-CM

## 2024-08-29 LAB
ALBUMIN UR-MCNC: NEGATIVE MG/DL
APPEARANCE UR: CLEAR
BILIRUB UR QL STRIP: NEGATIVE
COLOR UR AUTO: YELLOW
GLUCOSE UR STRIP-MCNC: NEGATIVE MG/DL
HGB UR QL STRIP: NEGATIVE
KETONES UR STRIP-MCNC: NEGATIVE MG/DL
LEUKOCYTE ESTERASE UR QL STRIP: NEGATIVE
MUCOUS THREADS #/AREA URNS LPF: PRESENT /LPF
NITRATE UR QL: NEGATIVE
PH UR STRIP: 5.5 [PH] (ref 5–7)
RBC URINE: 1 /HPF
SP GR UR STRIP: 1.03 (ref 1–1.03)
UROBILINOGEN UR STRIP-MCNC: 3 MG/DL
WBC URINE: 1 /HPF

## 2024-08-29 PROCEDURE — 93976 VASCULAR STUDY: CPT

## 2024-08-29 PROCEDURE — 99284 EMERGENCY DEPT VISIT MOD MDM: CPT | Mod: 25 | Performed by: EMERGENCY MEDICINE

## 2024-08-29 PROCEDURE — 99284 EMERGENCY DEPT VISIT MOD MDM: CPT | Performed by: EMERGENCY MEDICINE

## 2024-08-29 PROCEDURE — 81003 URINALYSIS AUTO W/O SCOPE: CPT | Performed by: EMERGENCY MEDICINE

## 2024-08-29 PROCEDURE — 250N000013 HC RX MED GY IP 250 OP 250 PS 637: Performed by: EMERGENCY MEDICINE

## 2024-08-29 PROCEDURE — 99214 OFFICE O/P EST MOD 30 MIN: CPT | Performed by: MASSAGE THERAPIST

## 2024-08-29 RX ORDER — OXYCODONE HYDROCHLORIDE 5 MG/1
5 TABLET ORAL ONCE
Status: COMPLETED | OUTPATIENT
Start: 2024-08-29 | End: 2024-08-29

## 2024-08-29 RX ORDER — IBUPROFEN 600 MG/1
600 TABLET, FILM COATED ORAL ONCE
Status: COMPLETED | OUTPATIENT
Start: 2024-08-29 | End: 2024-08-29

## 2024-08-29 RX ORDER — IBUPROFEN 600 MG/1
600 TABLET, FILM COATED ORAL EVERY 6 HOURS PRN
Qty: 30 TABLET | Refills: 0 | Status: SHIPPED | OUTPATIENT
Start: 2024-08-29

## 2024-08-29 RX ORDER — OXYCODONE HYDROCHLORIDE 5 MG/1
5 TABLET ORAL EVERY 6 HOURS PRN
Qty: 12 TABLET | Refills: 0 | Status: SHIPPED | OUTPATIENT
Start: 2024-08-29 | End: 2024-09-01

## 2024-08-29 RX ADMIN — OXYCODONE HYDROCHLORIDE 5 MG: 5 TABLET ORAL at 16:11

## 2024-08-29 RX ADMIN — OXYCODONE HYDROCHLORIDE 5 MG: 5 TABLET ORAL at 17:21

## 2024-08-29 RX ADMIN — IBUPROFEN 600 MG: 600 TABLET, FILM COATED ORAL at 17:21

## 2024-08-29 ASSESSMENT — COLUMBIA-SUICIDE SEVERITY RATING SCALE - C-SSRS
1. IN THE PAST MONTH, HAVE YOU WISHED YOU WERE DEAD OR WISHED YOU COULD GO TO SLEEP AND NOT WAKE UP?: NO
2. HAVE YOU ACTUALLY HAD ANY THOUGHTS OF KILLING YOURSELF IN THE PAST MONTH?: NO
6. HAVE YOU EVER DONE ANYTHING, STARTED TO DO ANYTHING, OR PREPARED TO DO ANYTHING TO END YOUR LIFE?: NO

## 2024-08-29 ASSESSMENT — ACTIVITIES OF DAILY LIVING (ADL)
ADLS_ACUITY_SCORE: 35
ADLS_ACUITY_SCORE: 35

## 2024-08-29 NOTE — DISCHARGE INSTRUCTIONS
Wear supportive undergarments such as tight underwear or a jockstrap.  Take ibuprofen as needed for pain.  Take oxycodone as needed for pain not controlled by ibuprofen.    Follow-up with urology.  A referral has been placed.  Urology Clinic (phone: [phone]).    Return if fever, vomiting, worse, or other concerns.

## 2024-08-29 NOTE — ED PROVIDER NOTES
ED Provider Note  [facility] EMERGENCY DEPARTMENT ([facility])    8/29/24      ED PROVIDER NOTE      History     Chief Complaint   Patient presents with    Groin Pain     Fluid on left testicle      The history is provided by the patient and medical records.     Bill Wisdom is a 54 year old male with history of left spermatocele s/pain spermatocelectomy in [place] in 2006 complicated by recurrence s/p repeat left spermatocelectomy on 03/20/2012 with Dr. Nathan Rosario who presents to the emergency department from urgent care clinic with significant testicular pain and swelling.  Symptoms started 6 weeks ago after straining his back while lifting at work.  He was seen in clinic about 2 weeks ago with complaints of significant left testicle pain, was treated with a course of Bactrim for possible orchitis versus epididymitis.  This did not change his symptoms at all.  He does have a remote history of hydrocele and spermatocele.  The patient underwent testicular ultrasound on 8/6/2024 as below.  He states that his testicle has become more swollen since his evaluation 3 weeks ago.  He denies any fever.  He denies any dysuria.  He denies any hematuria.  He denies any rectal or perineal pain.  No difficulty with bowel movements.  Testicle significantly swollen and tender on exam today in urgent care.  They attempted to call urology to see if they could get him into clinic urgently.  However, never heard back from urologist on-call.  Ultimately, recommended patient be seen in the emergency department for more urgent evaluation and treatment of his significant testicle swelling and pain.  - Adult Urology  Referral    US TESTICULAR AND SCROTUM WITH DOPPLER LIMITED 8/6/2024 9:52 AM       CLINICAL HISTORY: Patient with testicular pain, c/f for possible  torsion; Testicular pain, left     COMPARISON: Ultrasound 1/25/2012         PROCEDURE COMMENTS: Ultrasound of the  scrotum was performed with color  and spectral Doppler.     FINDINGS:     Right testis: 4.4 x 3.1 x 2.5 cm, volume of 17.8 mL and demonstrates  normal size, shape and echotexture. Epididymis is normal. There is no  hydrocephalus or mass.     Left testis: 4.1 x 2.9 x 3.0 cm, volume of 18.6 mm demonstrating an  avascular, anechoic, minimally lobulated cyst in the left testicle,  measuring 0.7 x 0.4 x 0.6 cm. The left testes is otherwise normal in  size, shape and echotexture. Unchanged simple subcentimeter epididymal  head cyst. Large multiseptated extratesticular cystic focus with  punctate echogenic debris, measuring 4.7 x 4.3 x 6.1 cm (previously  8.3 x 6.3 x 4.1 cm) and is slightly increased septation since 2012.     Normal testicular blood flow to both testes as documented by both  color Doppler evaluation and spectral Doppler waveforms. No evidence  of testicular torsion.    IMPRESSION:  1. New tiny benign left intratesticular cyst versus cystic tubular  ectasia of the rete testis since 01/25/2012.  2. Decreased size of a large multiseptated left spermatocele.  3. No evidence of testicular torsion.      Past Medical History  Past Medical History:   Diagnosis Date    COPD (chronic obstructive pulmonary disease) (H)     Gastro-oesophageal reflux disease     History of tobacco abuse     Obese     CHANDLER (obstructive sleep apnea)     Prediabetes      Past Surgical History:   Procedure Laterality Date    HYDROCELECTOMY SCROTAL  2006    INJECT SACROILIAC JOINT Right 9/16/2019    Procedure: Right Sacroiliac Joint Injection;  Surgeon: Sina Laboy MD;  Location: [facility] OR    SPERMATOCELECTOMY  3/20/2012    Procedure:SPERMATOCELECTOMY; Left Spermatocelectomy; Surgeon:DELLA CROW; Location:[facility] OR     acetaminophen (TYLENOL) 500 MG tablet  albuterol (PROVENTIL HFA) 108 (90 Base) MCG/ACT inhaler  albuterol (PROVENTIL) (2.5 MG/3ML) 0.083% neb solution  allopurinol (ZYLOPRIM) 100 MG tablet  Cholecalciferol (VITAMIN D3)  75 MCG (3000 UT) TABS  cyclobenzaprine (FLEXERIL) 10 MG tablet  ibuprofen (ADVIL/MOTRIN) 600 MG tablet  Multiple Vitamins-Minerals (MULTIVITAMIN ADULT) CHEW  naproxen (NAPROSYN) 500 MG tablet  naproxen (NAPROSYN) 500 MG tablet  order for DME  order for DME  tiZANidine (ZANAFLEX) 4 MG capsule  topiramate (TOPAMAX) 100 MG tablet  traZODone (DESYREL) 100 MG tablet      No Known Allergies  Family History  Family History   Problem Relation Age of Onset    C.A.D. Mother         triple bypass    Hypertension Father     Cerebrovascular Disease Father     Alcohol/Drug Sister      Social History   Social History     Tobacco Use    Smoking status: Former     Current packs/day: 0.00     Types: Cigarettes     Quit date: 2019     Years since quittin.6    Smokeless tobacco: Never   Substance Use Topics    Alcohol use: No     Comment: quit drinking 2019    Drug use: Yes     Types: Marijuana     Comment: Last used 2 days ago      A medically appropriate review of systems was performed with pertinent positives and negatives noted in the HPI, and all other systems negative.    Physical Exam   BP: 101/59  Pulse: 79  Temp: 97.8  F (36.6  C)  Resp: 16  Weight: 111.1 kg (245 lb)  SpO2: 96 %  Physical Exam  Vitals and nursing note reviewed.   Constitutional:       General: He is not in acute distress.     Appearance: Normal appearance. He is not diaphoretic.   HENT:      Head: Normocephalic and atraumatic.   Eyes:      General: No scleral icterus.     Pupils: Pupils are equal, round, and reactive to light.   Cardiovascular:      Pulses: Normal pulses.   Pulmonary:      Effort: No respiratory distress.   Abdominal:      General: Bowel sounds are normal.      Palpations: Abdomen is soft.      Tenderness: There is no abdominal tenderness.      Hernia: There is no hernia in the left inguinal area or right inguinal area.   Genitourinary:     Penis: Normal.       Testes:         Left: Tenderness and swelling present.    Musculoskeletal:         General: No tenderness.   Skin:     General: Skin is warm.      Findings: No rash.   Neurological:      Mental Status: He is alert.           ED Course, Procedures, & Data      Reviewed [facility] emergency department encounter from 7/24/2024.  Reviewed primary care visit from 8/6/2024 including Uri probe and testicular ultrasound.  Reviewed urgent care visit from earlier today.  Procedures                Results for orders placed or performed during the hospital encounter of 08/29/24   US Testicular & Scrotum w Doppler Ltd     Status: None (Preliminary result)    Narrative    EXAM: US TESTICULAR AND SCROTUM WITH DOPPLER LIMITED  LOCATION: [facility]  DATE: 8/29/2024    INDICATION: Left testicular pain and swelling, evaluate for torsion.    COMPARISON: 8/6/2024.    TECHNIQUE: Ultrasound of scrotum with color flow and spectral Doppler with waveform analysis performed.    FINDINGS:  RIGHT: Right testicle measures 4.5 x 2.9 x 2.4 cm. Normal testicle with no masses. Normal arterial duplex and normal color flow. Small spermatocele or epididymal cyst. No hydrocele. No varicocele.    LEFT: Left testicle measures 4.1 x 3.4 x 2.5 cm. Normal blood flow. No torsion. A few small testicular cysts. Large complex multiseptated debris-containing cysts within the left epididymis compatible with complex spermatocele. This measures 7.2 x 5.6 x   5.4 cm and has increased mildly in size. No hydrocele. No varicocele.      Impression    IMPRESSION:  1.  No evidence for testicular torsion. Blood flow to both testicles.    2.  Large complex multiseptated left spermatocele, increased mildly in size.     UA with Microscopic reflex to Culture     Status: Abnormal    Specimen: Urine, Clean Catch   Result Value Ref Range    Color Urine Yellow Colorless, Straw, Light Yellow, Yellow    Appearance Urine Clear Clear    Glucose Urine Negative Negative mg/dL    Bilirubin  Urine Negative Negative    Ketones Urine Negative Negative mg/dL    Specific Gravity Urine 1.030 1.003 - 1.035    Blood Urine Negative Negative    pH Urine 5.5 5.0 - 7.0    Protein Albumin Urine Negative Negative mg/dL    Urobilinogen Urine 3.0 (A) Normal, 2.0 mg/dL    Nitrite Urine Negative Negative    Leukocyte Esterase Urine Negative Negative    Mucus Urine Present (A) None Seen /LPF    RBC Urine 1 <=2 /HPF    WBC Urine 1 <=5 /HPF    Narrative    Urine Culture not indicated     Medications   oxyCODONE (ROXICODONE) tablet 5 mg (5 mg Oral $Given 8/29/24 1611)   ibuprofen (ADVIL/MOTRIN) tablet 600 mg (600 mg Oral $Given 8/29/24 1721)   oxyCODONE (ROXICODONE) tablet 5 mg (5 mg Oral $Given 8/29/24 1721)     Labs Ordered and Resulted from Time of ED Arrival to Time of ED Departure   ROUTINE UA WITH MICROSCOPIC REFLEX TO CULTURE - Abnormal       Result Value    Color Urine Yellow      Appearance Urine Clear      Glucose Urine Negative      Bilirubin Urine Negative      Ketones Urine Negative      Specific Gravity Urine 1.030      Blood Urine Negative      pH Urine 5.5      Protein Albumin Urine Negative      Urobilinogen Urine 3.0 (*)     Nitrite Urine Negative      Leukocyte Esterase Urine Negative      Mucus Urine Present (*)     RBC Urine 1      WBC Urine 1       US Testicular & Scrotum w Doppler Ltd   Preliminary Result   IMPRESSION:   1.  No evidence for testicular torsion. Blood flow to both testicles.      2.  Large complex multiseptated left spermatocele, increased mildly in size.                Critical care was not performed.     Medical Decision Making  The patient's presentation was of moderate complexity (a chronic illness mild to moderate exacerbation, progression, or side effect of treatment).    The patient's evaluation involved:  review of external note(s) from 2 sources (see separate area of note for details)  review of 2 test result(s) ordered prior to this encounter (see separate area of note for  details)  ordering and/or review of 2 test(s) in this encounter (see separate area of note for details)    The patient's management necessitated moderate risk (prescription drug management including medications given in the ED).    Assessment & Plan    54 year old male with history of left-sided spermatocele to the emergency department with with increased swelling and pain.  UA is normal.  Ultrasound reveals normal blood flow but an enlarging spermatocele.  Patient provided pain medication here in the emergency department.  He will be discharged home with recommendation for supportive underwear.  He also be discharged home with ibuprofen as well as oxycodone for breakthrough pain.  Urgent urology follow-up referral has already been placed out of urgent care.  I agree with this timeframe.    I have reviewed the nursing notes. I have reviewed the findings, diagnosis, plan and need for follow up with the patient.    New Prescriptions    No medications on file       Final diagnoses:   Testicular pain, left   Spermatocele     Chart documentation was completed with Dragon voice-recognition software. Even though reviewed, this chart may still contain some grammatical, spelling, and word errors.     [facility] EMERGENCY DEPARTMENT  8/29/2024     Vignesh Pascal MD  08/29/24 6501

## 2024-08-29 NOTE — PROGRESS NOTES
Assessment & Plan     Pain in left testicle  6 weeks of significant left testicle pain after straining his back while lifting at work.  Seen in clinic about 2 weeks ago.  Completed a course of Bactrim for possible orchitis versus epididymitis.  This did not change his symptoms at all.  He does have a remote history of hydrocele and spermatocele.  Testicle significantly swollen and tender on exam today.  Attempted to call urology to see if they could get him into clinic urgently.  However, never heard back from urologist on-call.  Ultimately, recommended patient be seen in the emergency department for more urgent evaluation and treatment of his significant testicle swelling and pain.  - Adult Urology  Referral       No follow-ups on file.    Benjamín Joshua MD  [facility] URGENT [facility] [place]    Becca Reed is a 54 year old male who presents to clinic today for the following health issues:  Chief Complaint   Patient presents with    Urgent Care     Lower back pain into the left groin, injured at work        HPI    Back/Testicle pain:   Location: left side of genital - hx of hydrocele and spermatocele surgery (years ago) - no numbness or tingingling      Duration: started 7/24-after workplace lifting injury.  Pain in low back and testicles.  Radiation: to both knees   Numbness/ Tingling?  No  Weakness? Some in left   Red flags? No urinary symptoms, bowel movement symptoms.  No saddle paresthesia  Meds tried?  Given a course of Bactrim for possible orchitis versus epididymitis on 8/6-no change in symptoms.  No relief with Tylenol, ibuprofen  PT? Maybe helps a little with back pain but does not help with testicle pain at all  Imaging? US on 8 6    Seen in ED after workplace injury on 7/24.  Has been seen physical therapy.  Most recently 8/12            Objective    /69   Pulse 71   Temp 97.8  F (36.6  C)   Resp 18   Wt 110.7 kg (244 lb)   SpO2 97%   BMI 39.40 kg/m    Physical  Exam     GENERAL: healthy, alert and no distress  RESP: speaking in full sentences, normal work of breathing   CV: extremities appear well perfused  ABDOMEN: nondistended   MS: no gross musculoskeletal defects noted  PSYCH: mentation appears normal, affect normal/bright    Genital: Left testicle significantly swollen.  Tender.

## 2024-08-29 NOTE — LETTER
2024    Bill Wisdom   1970        To Whom it May Concern;    Please excuse Bill Wisdom from work/school for a healthcare visit on Aug 29, 2024.    Sincerely,        Benjamín Joshua MD

## 2024-08-29 NOTE — ED TRIAGE NOTES
Pt comes in due to a work injury. Pt was lifting a heavy crate of milk. The milk crate went down into his groin and he got a hydrocele. Pt states a few weeks ago he was lifting something heavy again and he now has fluid on his left testicle and is in excruciating pain. Pt is having a hard time walking.      Triage Assessment (Adult)       Row Name 08/29/24 2678          Triage Assessment    Airway WDL WDL        Respiratory WDL    Respiratory WDL WDL        Skin Circulation/Temperature WDL    Skin Circulation/Temperature WDL WDL        Cardiac WDL    Cardiac WDL WDL        Peripheral/Neurovascular WDL    Peripheral Neurovascular WDL WDL        Cognitive/Neuro/Behavioral WDL    Cognitive/Neuro/Behavioral WDL WDL

## 2024-09-03 ENCOUNTER — TELEPHONE (OUTPATIENT)
Dept: UROLOGY | Facility: CLINIC | Age: 54
End: 2024-09-03
Payer: COMMERCIAL

## 2024-09-03 NOTE — TELEPHONE ENCOUNTER
"Call placed to patient. I let him know that Shahram feels he should see Dr Rosario or Dr Kidd. He is c/o pain that is not helped by OTC medications. I encouraged him to be seen in urgent care for this. He states, \"I'm just going to go back to the ED\".    Thank you,  Cailin Regalado RN, BSN Urology Triage    "

## 2024-09-03 NOTE — TELEPHONE ENCOUNTER
[facility] Call Center    Phone Message    May a detailed message be left on voicemail: yes     Reason for Call: Appointment Intake    Referring Provider Name: Benjamín Joshua MD  Diagnosis and/or Symptoms: Testicular swelling and pain.  Patient states he was seen in the ER on 8/29/24 for testicular swelling and pain. (Records are in EPIC). Patient states this is related to a work injury from a few weeks ago and is wanting to be seen ASAP. Referral request is 3-5 days. Patient stated at the time of call he is still in a lot of pain and needing more medication for pain. Patient reports he may be returning to ER due to discomfort. Sending to clinic to review for 3-5 day request and writer not being able to meet that. Patient is not comfortable with waiting. Please call patient back after review regarding scheduling an appointment.    Action Taken: Other: UROLOGY    Travel Screening: Not Applicable

## 2024-09-03 NOTE — TELEPHONE ENCOUNTER
"[facility] Call Center    Phone Message    May a detailed message be left on voicemail: yes     Reason for Call: Other: Pt called again. OTC pain meds are not helping at all. Please call pt asap. Writer offered red flag triage, but opted to wait for your call, hoping it's soon. Says \"it feels like a vice is squeezing\"... Thank you!     Action Taken: Message routed to:  [facility] urology    Travel Screening: Not Applicable     Date of Service:                                                                     "

## 2024-09-03 NOTE — TELEPHONE ENCOUNTER
Patient confirmed scheduled appointment:  Date: Sept 10th   Time: 11am  Visit type: New Urology   Provider: Dr. Kidd  Location: [place]

## 2024-09-04 ENCOUNTER — TELEPHONE (OUTPATIENT)
Dept: INTERNAL MEDICINE | Facility: CLINIC | Age: 54
End: 2024-09-04
Payer: COMMERCIAL

## 2024-09-04 NOTE — TELEPHONE ENCOUNTER
Left Voicemail (1st Attempt) for the patient to call back and schedule the following:    Appointment type: RTN  Provider: Amalia Hernandez  Return date: 9/6* reschedule needed  Specialty phone number: [phone]  Additional appointment(s) needed: -  Additonal Notes: OK to use [facility] for reschedule- or patient can change to virtual clinic date

## 2024-09-05 ENCOUNTER — HOSPITAL ENCOUNTER (EMERGENCY)
Facility: CLINIC | Age: 54
Discharge: HOME | End: 2024-09-05
Attending: EMERGENCY MEDICINE | Admitting: EMERGENCY MEDICINE
Payer: COMMERCIAL

## 2024-09-05 ENCOUNTER — APPOINTMENT (OUTPATIENT)
Dept: ULTRASOUND IMAGING | Facility: CLINIC | Age: 54
End: 2024-09-05
Attending: EMERGENCY MEDICINE
Payer: COMMERCIAL

## 2024-09-05 VITALS
RESPIRATION RATE: 18 BRPM | OXYGEN SATURATION: 98 % | HEIGHT: 65 IN | SYSTOLIC BLOOD PRESSURE: 124 MMHG | WEIGHT: 249 LBS | BODY MASS INDEX: 41.48 KG/M2 | TEMPERATURE: 98.1 F | HEART RATE: 73 BPM | DIASTOLIC BLOOD PRESSURE: 74 MMHG

## 2024-09-05 DIAGNOSIS — N43.40 SPERMATOCELE: ICD-10-CM

## 2024-09-05 LAB
ALBUMIN UR-MCNC: 10 MG/DL
APPEARANCE UR: CLEAR
BILIRUB UR QL STRIP: NEGATIVE
COLOR UR AUTO: ABNORMAL
GLUCOSE UR STRIP-MCNC: NEGATIVE MG/DL
HGB UR QL STRIP: NEGATIVE
KETONES UR STRIP-MCNC: NEGATIVE MG/DL
LEUKOCYTE ESTERASE UR QL STRIP: NEGATIVE
MUCOUS THREADS #/AREA URNS LPF: PRESENT /LPF
NITRATE UR QL: NEGATIVE
PH UR STRIP: 7.5 [PH] (ref 5–7)
RBC URINE: 1 /HPF
SP GR UR STRIP: 1.03 (ref 1–1.03)
UROBILINOGEN UR STRIP-MCNC: 2 MG/DL
WBC URINE: 1 /HPF

## 2024-09-05 PROCEDURE — 99284 EMERGENCY DEPT VISIT MOD MDM: CPT | Performed by: EMERGENCY MEDICINE

## 2024-09-05 PROCEDURE — 87491 CHLMYD TRACH DNA AMP PROBE: CPT | Performed by: EMERGENCY MEDICINE

## 2024-09-05 PROCEDURE — 81001 URINALYSIS AUTO W/SCOPE: CPT | Performed by: EMERGENCY MEDICINE

## 2024-09-05 PROCEDURE — 250N000013 HC RX MED GY IP 250 OP 250 PS 637: Performed by: EMERGENCY MEDICINE

## 2024-09-05 PROCEDURE — 87591 N.GONORRHOEAE DNA AMP PROB: CPT | Performed by: EMERGENCY MEDICINE

## 2024-09-05 PROCEDURE — 93976 VASCULAR STUDY: CPT

## 2024-09-05 PROCEDURE — 99284 EMERGENCY DEPT VISIT MOD MDM: CPT | Mod: 25 | Performed by: EMERGENCY MEDICINE

## 2024-09-05 PROCEDURE — 76870 US EXAM SCROTUM: CPT

## 2024-09-05 RX ORDER — IBUPROFEN 600 MG/1
600 TABLET, FILM COATED ORAL ONCE
Status: COMPLETED | OUTPATIENT
Start: 2024-09-05 | End: 2024-09-05

## 2024-09-05 RX ORDER — OXYCODONE HYDROCHLORIDE 5 MG/1
5 TABLET ORAL EVERY 6 HOURS PRN
Qty: 12 TABLET | Refills: 0 | Status: SHIPPED | OUTPATIENT
Start: 2024-09-05 | End: 2024-09-10

## 2024-09-05 RX ORDER — OXYCODONE HYDROCHLORIDE 5 MG/1
5 TABLET ORAL EVERY 6 HOURS PRN
Qty: 12 TABLET | Refills: 0 | Status: SHIPPED | OUTPATIENT
Start: 2024-09-05 | End: 2024-09-05

## 2024-09-05 RX ORDER — OXYCODONE HYDROCHLORIDE 5 MG/1
5 TABLET ORAL ONCE
Status: COMPLETED | OUTPATIENT
Start: 2024-09-05 | End: 2024-09-05

## 2024-09-05 RX ADMIN — IBUPROFEN 600 MG: 600 TABLET, FILM COATED ORAL at 11:35

## 2024-09-05 RX ADMIN — OXYCODONE HYDROCHLORIDE 5 MG: 5 TABLET ORAL at 11:35

## 2024-09-05 RX ADMIN — OXYCODONE HYDROCHLORIDE 5 MG: 5 TABLET ORAL at 13:24

## 2024-09-05 ASSESSMENT — ACTIVITIES OF DAILY LIVING (ADL)
ADLS_ACUITY_SCORE: 35

## 2024-09-05 NOTE — ED TRIAGE NOTES
Pt states that he has a urology appt but the pain is too much     Triage Assessment (Adult)       Row Name 09/05/24 1006          Triage Assessment    Airway WDL WDL        Respiratory WDL    Respiratory WDL WDL        Skin Circulation/Temperature WDL    Skin Circulation/Temperature WDL WDL        Cardiac WDL    Cardiac WDL WDL        Peripheral/Neurovascular WDL    Peripheral Neurovascular WDL WDL        Cognitive/Neuro/Behavioral WDL    Cognitive/Neuro/Behavioral WDL WDL

## 2024-09-05 NOTE — ED PROVIDER NOTES
ED Provider Note  [facility]      History     Chief Complaint   Patient presents with    Groin Injury     Pt states that he had a groin injury last week and was seen here. He is returning with increased swelling and pain     The history is provided by the patient and medical records.     Bill Wisdom is a 54 year old male with history of left spermatocele s/p spermatocelectomy in [place] in 2006 complicated by recurrence s/p repeat left spermatocelectomy on 03/20/2012 who presents to the ED with increased groin pain and swelling. Patient reports pain began around 6 weeks ago after straining his back while doing some heavy lifting at work. He has been taking Tylenol, ibuprofen, and prescribed oxy for pain without much relief. He states that pain and swelling has continued to worsen since his last ED visit on 8/29. He does have an appt with Urology next week. He has had no urinary symptoms.     Per review of chart patient seen initially 7/24 in the [facility] ED after sudden onset back and testicular pain while lifting 10-13 cartons of milk at work. Lumbar XR showed chronic DDD, but no acute fractures. Pain improved and no alarming symptoms so no further imaging done.     He was then seen in IM clinic on 8/6 with increased testicular pain. Blood work showed elevated white count and CRP concerning for acute inflammatory process. Testicular US showed adequate arterial and venous flow ruling out torsion, but showed a few minimally lobulated septic lesions in the testes as well as epidermidis. Radiology not convinced of underlying malignant etiology. Also not concerned  for epididymitis or orchitis, but he was prescribed a 10 day course of Bactrim and given PT referral for ongoing back pain.     Patient then seen in  on 8/29 with ongoing pain and swelling. He completed the course of Bactrim with no improvement in symptoms so was sent here to the ED for further work-up. UA normal. US revealed  normal blood flow, but an enlarging spermatocele. Pain improved with oxycodone and he was discharged with prescription for oxycodone as well as recommendation for supportive underwear and urgent Urology follow up referral placed. Appointment was made for 9/10 at 11 am with Dr. Kidd with  Urology.     US Testicular 8/29/24  IMPRESSION:  1.  No evidence for testicular torsion. Blood flow to both testicles.  2.  Large complex multiseptated left spermatocele, increased mildly in size.    Past Medical History  Past Medical History:   Diagnosis Date    COPD (chronic obstructive pulmonary disease) (H)     Gastro-oesophageal reflux disease     History of tobacco abuse     Obese     CHANDLER (obstructive sleep apnea)     Prediabetes      Past Surgical History:   Procedure Laterality Date    HYDROCELECTOMY SCROTAL  2006    INJECT SACROILIAC JOINT Right 9/16/2019    Procedure: Right Sacroiliac Joint Injection;  Surgeon: Sina Laboy MD;  Location: [facility] OR    SPERMATOCELECTOMY  3/20/2012    Procedure:SPERMATOCELECTOMY; Left Spermatocelectomy; Surgeon:DELLA CROW; Location:[facility] OR     acetaminophen (TYLENOL) 500 MG tablet  albuterol (PROVENTIL HFA) 108 (90 Base) MCG/ACT inhaler  allopurinol (ZYLOPRIM) 100 MG tablet  Cholecalciferol (VITAMIN D3) 75 MCG (3000 UT) TABS  cyclobenzaprine (FLEXERIL) 10 MG tablet  ibuprofen (ADVIL/MOTRIN) 600 MG tablet  tiZANidine (ZANAFLEX) 4 MG capsule  topiramate (TOPAMAX) 100 MG tablet  traZODone (DESYREL) 100 MG tablet  albuterol (PROVENTIL) (2.5 MG/3ML) 0.083% neb solution  ibuprofen (ADVIL/MOTRIN) 600 MG tablet  Multiple Vitamins-Minerals (MULTIVITAMIN ADULT) CHEW  naproxen (NAPROSYN) 500 MG tablet  naproxen (NAPROSYN) 500 MG tablet  order for DME  order for DME      No Known Allergies  Family History  Family History   Problem Relation Age of Onset    C.A.D. Mother         triple bypass    Hypertension Father     Cerebrovascular Disease Father     Alcohol/Drug Sister      Social  "History   Social History     Tobacco Use    Smoking status: Former     Current packs/day: 0.00     Types: Cigarettes     Quit date: 2019     Years since quittin.6    Smokeless tobacco: Never   Substance Use Topics    Alcohol use: No     Comment: quit drinking 2019    Drug use: Yes     Types: Marijuana     Comment: Last used 2 days ago      A complete review of systems was performed with pertinent positives and negatives noted in the HPI, and all other systems negative.    Physical Exam   BP: 124/74  Pulse: 73  Temp: 98.1  F (36.7  C)  Resp: 18  Height: 165.1 cm (5' 5\")  Weight: 112.9 kg (249 lb)  SpO2: 98 %  Physical Exam  Vitals and nursing note reviewed. Exam conducted with a chaperone present.   Constitutional:       Comments: Lying on bed,  sweating.    HENT:      Head: Normocephalic and atraumatic.      Right Ear: External ear normal.      Left Ear: External ear normal.      Nose: Nose normal.   Eyes:      Extraocular Movements: Extraocular movements intact.   Cardiovascular:      Rate and Rhythm: Normal rate and regular rhythm.      Heart sounds: Normal heart sounds.   Pulmonary:      Effort: Pulmonary effort is normal.      Breath sounds: Normal breath sounds.   Abdominal:      General: There is no distension.      Palpations: Abdomen is soft. There is no mass.      Tenderness: There is no abdominal tenderness. There is no guarding or rebound.      Hernia: No hernia is present.   Genitourinary:     Penis: Normal and circumcised.       Testes:         Left: Mass, tenderness and swelling present.      Comments: Swelling tender left scrotum. No redness, warmth or crepitance.  Mass is easily reducible.   Musculoskeletal:         General: Normal range of motion.      Cervical back: Normal range of motion.   Skin:     General: Skin is warm and dry.      Findings: No rash.   Neurological:      General: No focal deficit present.      Mental Status: He is alert and oriented to person, place, and time. "   Psychiatric:         Mood and Affect: Mood is anxious.           ED Course, Procedures, & Data      Procedures        Results for orders placed or performed during the hospital encounter of 09/05/24   US Testicular & Scrotum w Doppler Ltd     Status: None (Preliminary result)    Narrative    US TESTICULAR AND SCROTUM WITH DOPPLER LIMITED 9/5/2024 11:05 AM    CLINICAL HISTORY: Left testicular pain and swelling.  TECHNIQUE: Ultrasound of scrotum with color flow and spectral Doppler  with waveform analysis performed.  COMPARISON: Testicular ultrasound 8/29/2024.    FINDINGS:    RIGHT: Right testicle measures 4.6 x 2.9 x 2.8 cm. Normal testicle  with no masses. Normal arterial duplex and normal color flow. Normal  epididymis. No hydrocele. No varicocele.    LEFT: Left testicle measures 4 x 2.6 x 3 cm. Small intratesticular  cyst on the left inferiorly measures 0.7 cm, unchanged. Otherwise  unremarkable testicle with no masses. Normal arterial duplex and  normal color flow. Multiseptated extratesticular cystic lesion likely  originates from the left epididymis, measuring 7.8 x 3.9 x 4.1 cm,  similar to the previous exam. No hydrocele. No varicocele.      Impression    IMPRESSION:  1.  No significant interval change. No convincing sonographic evidence  for testicular torsion.  2.  Large multiseptated extratesticular cystic lesion on the left is  not significantly changed, and is likely a spermatocele originating  from the left epididymis.     Medications   oxyCODONE (ROXICODONE) tablet 5 mg (has no administration in time range)   oxyCODONE (ROXICODONE) tablet 5 mg (5 mg Oral $Given 9/5/24 1135)   ibuprofen (ADVIL/MOTRIN) tablet 600 mg (600 mg Oral $Given 9/5/24 1135)     Labs Ordered and Resulted from Time of ED Arrival to Time of ED Departure - No data to display  US Testicular & Scrotum w Doppler Ltd   Preliminary Result   IMPRESSION:   1.  No significant interval change. No convincing sonographic evidence   for  testicular torsion.   2.  Large multiseptated extratesticular cystic lesion on the left is   not significantly changed, and is likely a spermatocele originating   from the left epididymis.             Critical care was not performed.     Medical Decision Making  The patient's presentation was of moderate complexity (an acute illness with systemic symptoms).    The patient's evaluation involved:  review of external note(s) from 1 sources (see separate area of note for details)  review of 3+ test result(s) ordered prior to this encounter (8/29/24)  ordering and/or review of 3+ test(s) in this encounter (see separate area of note for details)  discussion of management or test interpretation with another health professional (urology resident)    The patient's management necessitated moderate risk (prescription drug management including medications given in the ED).    Assessment & Plan    Bill Wisdom is a 54 year old male with history of left spermatocele s/p spermatocelectomy in [place] in 2006 complicated by recurrence s/p repeat left spermatocelectomy on 03/20/2012 who presents to the ED with increased groin pain and swelling.  Epic was reviewed for recent ed visit, recent ultrasound and labs.  He has a large scrotal mass with no redness or crepitance. It is easily reducible.  Testicular ultrasound ordered again today and urology consulted. Patient declined any blood tests.  Testicular ultrasound shows no new concerns or need for emergent surgery.  I discussed his case with urology who felt this was not emergent and that he needs to continue with plan to see Dr. Kidd next week as already schedule.  They suggested warmth soaks to help with pain, supportive underwear, elevation of testicle could be helpful.  I discussed these recommendations with the patient.  Will given another script for oxycodone for breakthrough pain.  UA on 8/29/24 did not suggest uti.      I have reviewed the nursing notes. I have reviewed  the findings, diagnosis, plan and need for follow up with the patient.    New Prescriptions    No medications on file       Final diagnoses:   Spermatocele   IMary, am serving as a trained medical scribe to document services personally performed by Melisa Mejias MD, based on the provider's statements to me.     Melisa LEIGH MD, was physically present and have reviewed and verified the accuracy of this note documented by Mary Mancini.      Melisa Mejias MD  [facility] EMERGENCY DEPARTMENT  9/5/2024        Melisa Mejias MD  09/06/24 [id number]

## 2024-09-05 NOTE — Clinical Note
Bill Wisdom was seen and treated in our emergency department on 9/5/2024.  He may return to work on 09/07/2024.       If you have any questions or concerns, please don't hesitate to call.      Melisa Mejias MD

## 2024-09-05 NOTE — DISCHARGE INSTRUCTIONS
It is very important that you follow up with Urology as already scheduled to discuss treatment.     Patient confirmed scheduled appointment:  September 10th at 11 am. Arrive at 1045 am.    Counts include 234 beds at the [facility]  [address]  Cl[address]  Enter on the main entrance, continue on the main floor hallway to Clinic D. down the main hallway     Provider: Dr. Kidd    Urology Clinic (phone: [phone])               For now you can sit in a warm bath to help with the pain.     Wear supportive underwear and you can also try putting a couple socks under the left testicle to help elevate it while wearing the supportive underwear.    Continue taking ibuprofen 600 mg every 6 hours for pain. Take with food to avoid stomach upset.     Take oxycodone for breakthrough pain (pain not relieved by ibuprofen.  Prescription given.     Work note given.

## 2024-09-06 LAB
C TRACH DNA SPEC QL NAA+PROBE: NEGATIVE
N GONORRHOEA DNA SPEC QL NAA+PROBE: NEGATIVE

## 2024-09-09 NOTE — PROGRESS NOTES
UROLOGY CLINIC NEW VISIT    Chief Complaint: Spermatocele, low back pain, L groin pain     Referring Provider:  Benjamín Joshua    Subjective:     Bill Wisdom is a 54 year old male and is a new patient to the urology clinic, with a PMHx significant for those items listed, who is presenting to clinic today to discuss recurrent spermatocele.    Spermatocelectomy 2006, then repeat spermatocelectomy 2012 with R groin pain and swelling recently seen in ED 9/5. Lifting heavy crates 8/24/24  Has shooting pain in the back and spine with radicular referring down legs to groin. Notes that he had some recurrent fluid/swelling since last spermatocele, however this became precipitously worse with back injury and now constant, sharp, and irrespective of activity or position.     No incontinence of urine or stool. Getting urine out well.   UA negative, GC negative. No fevers chills nausea or vomiting hematuria or discharge.     Ultrasound 8/6/24  Large multiseptated extratesticular cystic lesion on the left is  not significantly changed, and is likely a spermatocele originating  from the left epididymis.    Currently the patient has no fever, chills, nausea, vomiting or other signs/symptoms suggestive of acute systemic infection.    PMH  Past Medical History:   Diagnosis Date    COPD (chronic obstructive pulmonary disease) (H)     Gastro-oesophageal reflux disease     History of tobacco abuse     Obese     CHANDLER (obstructive sleep apnea)     Prediabetes      PSH  Past Surgical History:   Procedure Laterality Date    HYDROCELECTOMY SCROTAL  2006    INJECT SACROILIAC JOINT Right 9/16/2019    Procedure: Right Sacroiliac Joint Injection;  Surgeon: Sina Laboy MD;  [facility]: [facility] OR    SPERMATOCELECTOMY  3/20/2012    Procedure:SPERMATOCELECTOMY; Left Spermatocelectomy; Surgeon:DELLA CROW; Location: OR     [facility]  Family History   Problem Relation Age of Onset    C.A.D. Mother         triple bypass    Hypertension Father  "    Cerebrovascular Disease Father     Alcohol/Drug Sister      ALLERGY  No Known Allergies  MEDICATIONS  has a current medication list which includes the following prescription(s): acetaminophen, albuterol, albuterol, allopurinol, vitamin d3, cyclobenzaprine, ibuprofen, ibuprofen, multivitamin adult, naproxen, order for dme, order for dme, oxycodone, tizanidine, topiramate, and trazodone.  ROS:  Constitutional: negative  Eyes: negative  Ears/Nose/Throat/Mouth: negative  Respiratory: negative  Cardiovascular: negative  Gastrointestinal: negative  Genito-Urinary: as documented above in HPI  Musculoskeletal: negative  Neurological: negative  Integumentary: negative      Objective:     There were no vitals taken for this visit.   Physical Exam:  GENERAL: Patient is AOx3, in no acute distress  CARDIAC: regular rate  LUNG: breathing non labored  ABD: soft, nondistended  : normal uncircumcised phallus, testes descended bilaterally  Patient with hydrocele vs spermatocele on examination, tight with significant TTP. No erythema or drainage   Mild R epididymal pain to much less of a degree.   Rectal exam, moderate rectal tone. No numbness or tingling in the perineal groin or buttock area    LABORATORY:  No results found for: \"CREATININE\"  Lab Results   Component Value Date    PSA 2.30 01/19/2023       Most Recent  Urinalysis:  Recent Labs   Lab Test 09/05/24  1310   COLOR Light Yellow   APPEARANCE Clear   URINEGLC Negative   URINEBILI Negative   URINEKETONE Negative   SG 1.029   UBLD Negative   URINEPH 7.5*   PROTEIN 10*   NITRITE Negative   LEUKEST Negative   RBCU 1   WBCU 1     Independent review of labs, cultures and imaging performed. Also discussion with another Physician regarding case performed.       Assessment:       Bill Wisdom is a 54 year old male, and is a new patient to the urology clinic, with a PMHx significant for those items listed, who is presenting to clinic today to discuss spermatocele, low back " pain, groin pain.       Plan:     L>R Scrotal/Testicular pain  Recurrent Spermatocele  Lumbar Spondylosis   - CT Abdomen pelvis given no recent cross sectional imaging since the injury to assess groin and scrotum. No current concern for cauda equina or testicular torsion or pyocele based on imaging   - Ortho  referral spine, given the mechanism of injury   - PFPT   - Doxy 2 weeks. Mobic, RICE  - Offered cord block/drainage of fluid in clinic for symptom relief. He does not want in clinic procedure and refused   - Discussed case with partner Dr. Rosario and radiology who is also agrees that spinal source more likely at this point and requires investigation. ROV once he has completed imaging, PFPT and Ortho consult. If there is still discomfort in the testicular area once the back and radicular pain has improved then would consider reoperative surgery though as discussed the risks would be substantially amplified. ED precautions extensively discussed, voiced understanding of this and plan

## 2024-09-10 ENCOUNTER — OFFICE VISIT (OUTPATIENT)
Dept: UROLOGY | Facility: CLINIC | Age: 54
End: 2024-09-10
Payer: OTHER MISCELLANEOUS

## 2024-09-10 VITALS
WEIGHT: 249 LBS | TEMPERATURE: 97.8 F | HEART RATE: 66 BPM | HEIGHT: 65 IN | RESPIRATION RATE: 18 BRPM | BODY MASS INDEX: 41.48 KG/M2 | DIASTOLIC BLOOD PRESSURE: 77 MMHG | SYSTOLIC BLOOD PRESSURE: 121 MMHG

## 2024-09-10 DIAGNOSIS — N50.812 PAIN IN LEFT TESTICLE: Primary | ICD-10-CM

## 2024-09-10 DIAGNOSIS — M54.50 ACUTE MIDLINE LOW BACK PAIN WITHOUT SCIATICA: ICD-10-CM

## 2024-09-10 PROCEDURE — 99204 OFFICE O/P NEW MOD 45 MIN: CPT | Performed by: STUDENT IN AN ORGANIZED HEALTH CARE EDUCATION/TRAINING PROGRAM

## 2024-09-10 RX ORDER — DOXYCYCLINE HYCLATE 100 MG
100 TABLET ORAL 2 TIMES DAILY
Qty: 28 TABLET | Refills: 0 | Status: SHIPPED | OUTPATIENT
Start: 2024-09-10 | End: 2024-09-24

## 2024-09-10 RX ORDER — MELOXICAM 15 MG/1
15 TABLET ORAL DAILY
Qty: 14 TABLET | Refills: 0 | Status: SHIPPED | OUTPATIENT
Start: 2024-09-10 | End: 2024-09-24

## 2024-09-10 ASSESSMENT — PAIN SCALES - GENERAL: PAINLEVEL: EXTREME PAIN (9)

## 2024-09-10 NOTE — NURSING NOTE
"Chief Complaint   Patient presents with    Testicular/scrotal Pain     Workman's comp case- patient is in extreme pain today.        Vitals:    09/10/24 1027   BP: 121/77   BP Location: Left arm   Patient Position: Sitting   Cuff Size: Adult Large   Pulse: 66   Resp: 18   Temp: 97.8  F (36.6  C)   TempSrc: Tympanic   Weight: 112.9 kg (249 lb)   Height: 1.651 m (5' 5\")     Wt Readings from Last 1 Encounters:   09/10/24 112.9 kg (249 lb)       Hedy WOODRUFF CMA.................9/10/2024    "

## 2024-09-10 NOTE — LETTER
September 10, 2024      Bill Wisdom  [address] [id number]        To Whom It May Concern:    Bill Wisdom  was seen on 9/10/24.  Please excuse him  until 9/24/24 due to injury and continued workup.        Sincerely,        Dioni Kidd MD

## 2024-09-10 NOTE — Clinical Note
9/10/2024    Bill Wisdom   1970        To Whom it May Concern;    Please excuse Bill Wisdom from work/school for a healthcare visit on Sep 10, 2024.    Sincerely,        Dioni Kidd MD

## 2024-09-12 NOTE — CONFIDENTIAL NOTE
NEUROSURGERY - NEW PREVISIT PLANNING    Referring Provider: Dioni Kidd MD    OVN 09/10/2024   Reason For Visit: M54.50 (ICD-10-CM) - Acute midline low back pain without sciatica        IMAGING STATUS/LOCATION DATE/TYPE   MRI PACS 03/16/2020  Lumbar  [facility]   CT N/A    XRAY PACS 07/24/2024  Lumbar  [facility]   NOTES STATUS/LOCATION DATE/TYPE   Other specialist OVN: Care Everywhere / ED 07/24/2024   [facility] Scan 08/19/2019   INJECTION Encounters 09/16/2019, right SI joint injection   PHYSICAL THERAPY Encounters 2024 (1 visit), 2019/2020   SURGERY N/A

## 2024-09-17 ENCOUNTER — PRE VISIT (OUTPATIENT)
Dept: NEUROSURGERY | Facility: CLINIC | Age: 54
End: 2024-09-17
Payer: COMMERCIAL

## 2024-09-20 ENCOUNTER — LAB (OUTPATIENT)
Dept: LAB | Facility: CLINIC | Age: 54
End: 2024-09-20
Payer: OTHER MISCELLANEOUS

## 2024-09-20 ENCOUNTER — OFFICE VISIT (OUTPATIENT)
Dept: INTERNAL MEDICINE | Facility: CLINIC | Age: 54
End: 2024-09-20
Payer: COMMERCIAL

## 2024-09-20 VITALS
SYSTOLIC BLOOD PRESSURE: 124 MMHG | TEMPERATURE: 97.6 F | HEART RATE: 75 BPM | DIASTOLIC BLOOD PRESSURE: 80 MMHG | WEIGHT: 254.8 LBS | OXYGEN SATURATION: 95 % | BODY MASS INDEX: 42.45 KG/M2 | HEIGHT: 65 IN

## 2024-09-20 DIAGNOSIS — Z12.11 SCREEN FOR COLON CANCER: ICD-10-CM

## 2024-09-20 DIAGNOSIS — Z11.59 NEED FOR HEPATITIS C SCREENING TEST: ICD-10-CM

## 2024-09-20 DIAGNOSIS — Z87.891 HISTORY OF TOBACCO ABUSE: ICD-10-CM

## 2024-09-20 DIAGNOSIS — F33.0 MILD RECURRENT MAJOR DEPRESSION (H): ICD-10-CM

## 2024-09-20 DIAGNOSIS — M54.50 ACUTE BILATERAL LOW BACK PAIN WITHOUT SCIATICA: ICD-10-CM

## 2024-09-20 DIAGNOSIS — Z87.891 PERSONAL HISTORY OF TOBACCO USE: ICD-10-CM

## 2024-09-20 DIAGNOSIS — Z11.4 SCREENING FOR HIV (HUMAN IMMUNODEFICIENCY VIRUS): ICD-10-CM

## 2024-09-20 DIAGNOSIS — R91.8 PULMONARY NODULES: ICD-10-CM

## 2024-09-20 DIAGNOSIS — E11.9 TYPE 2 DIABETES MELLITUS WITHOUT COMPLICATION, WITHOUT LONG-TERM CURRENT USE OF INSULIN (H): ICD-10-CM

## 2024-09-20 DIAGNOSIS — N50.812 TESTICULAR PAIN, LEFT: ICD-10-CM

## 2024-09-20 DIAGNOSIS — E11.9 TYPE 2 DIABETES MELLITUS WITHOUT COMPLICATION, WITHOUT LONG-TERM CURRENT USE OF INSULIN (H): Primary | ICD-10-CM

## 2024-09-20 LAB
ALBUMIN SERPL BCG-MCNC: 4.3 G/DL (ref 3.5–5.2)
ALP SERPL-CCNC: 95 U/L (ref 40–150)
ALT SERPL W P-5'-P-CCNC: 24 U/L (ref 0–70)
ANION GAP SERPL CALCULATED.3IONS-SCNC: 11 MMOL/L (ref 7–15)
AST SERPL W P-5'-P-CCNC: 20 U/L (ref 0–45)
BASOPHILS # BLD AUTO: 0.1 10E3/UL (ref 0–0.2)
BASOPHILS NFR BLD AUTO: 1 %
BILIRUB SERPL-MCNC: 0.2 MG/DL
BUN SERPL-MCNC: 16.5 MG/DL (ref 6–20)
CALCIUM SERPL-MCNC: 9.6 MG/DL (ref 8.8–10.4)
CHLORIDE SERPL-SCNC: 106 MMOL/L (ref 98–107)
CREAT SERPL-MCNC: 1.01 MG/DL (ref 0.67–1.17)
CREAT UR-MCNC: 81.8 MG/DL
EGFRCR SERPLBLD CKD-EPI 2021: 87 ML/MIN/1.73M2
EOSINOPHIL # BLD AUTO: 0.4 10E3/UL (ref 0–0.7)
EOSINOPHIL NFR BLD AUTO: 3 %
ERYTHROCYTE [DISTWIDTH] IN BLOOD BY AUTOMATED COUNT: 14.1 % (ref 10–15)
EST. AVERAGE GLUCOSE BLD GHB EST-MCNC: 128 MG/DL
GLUCOSE SERPL-MCNC: 76 MG/DL (ref 70–99)
HBA1C MFR BLD: 6.1 %
HCO3 SERPL-SCNC: 25 MMOL/L (ref 22–29)
HCT VFR BLD AUTO: 49.4 % (ref 40–53)
HCV AB SERPL QL IA: NONREACTIVE
HGB BLD-MCNC: 15.6 G/DL (ref 13.3–17.7)
HIV 1+2 AB+HIV1 P24 AG SERPL QL IA: NONREACTIVE
IMM GRANULOCYTES # BLD: 0.3 10E3/UL
IMM GRANULOCYTES NFR BLD: 2 %
LYMPHOCYTES # BLD AUTO: 2.3 10E3/UL (ref 0.8–5.3)
LYMPHOCYTES NFR BLD AUTO: 18 %
MCH RBC QN AUTO: 27.5 PG (ref 26.5–33)
MCHC RBC AUTO-ENTMCNC: 31.6 G/DL (ref 31.5–36.5)
MCV RBC AUTO: 87 FL (ref 78–100)
MICROALBUMIN UR-MCNC: <12 MG/L
MICROALBUMIN/CREAT UR: NORMAL MG/G{CREAT}
MONOCYTES # BLD AUTO: 1 10E3/UL (ref 0–1.3)
MONOCYTES NFR BLD AUTO: 8 %
NEUTROPHILS # BLD AUTO: 9 10E3/UL (ref 1.6–8.3)
NEUTROPHILS NFR BLD AUTO: 68 %
NRBC # BLD AUTO: 0 10E3/UL
NRBC BLD AUTO-RTO: 0 /100
PLATELET # BLD AUTO: 341 10E3/UL (ref 150–450)
POTASSIUM SERPL-SCNC: 4.2 MMOL/L (ref 3.4–5.3)
PROT SERPL-MCNC: 7.2 G/DL (ref 6.4–8.3)
RBC # BLD AUTO: 5.68 10E6/UL (ref 4.4–5.9)
SODIUM SERPL-SCNC: 142 MMOL/L (ref 135–145)
TSH SERPL DL<=0.005 MIU/L-ACNC: 0.88 UIU/ML (ref 0.3–4.2)
WBC # BLD AUTO: 13.2 10E3/UL (ref 4–11)

## 2024-09-20 PROCEDURE — G0296 VISIT TO DETERM LDCT ELIG: HCPCS | Performed by: NURSE PRACTITIONER

## 2024-09-20 PROCEDURE — 80053 COMPREHEN METABOLIC PANEL: CPT | Performed by: PATHOLOGY

## 2024-09-20 PROCEDURE — 84443 ASSAY THYROID STIM HORMONE: CPT | Performed by: PATHOLOGY

## 2024-09-20 PROCEDURE — 85025 COMPLETE CBC W/AUTO DIFF WBC: CPT | Performed by: PATHOLOGY

## 2024-09-20 PROCEDURE — 87389 HIV-1 AG W/HIV-1&-2 AB AG IA: CPT | Performed by: NURSE PRACTITIONER

## 2024-09-20 PROCEDURE — 86803 HEPATITIS C AB TEST: CPT | Performed by: NURSE PRACTITIONER

## 2024-09-20 PROCEDURE — 99214 OFFICE O/P EST MOD 30 MIN: CPT | Mod: 25 | Performed by: NURSE PRACTITIONER

## 2024-09-20 PROCEDURE — 82043 UR ALBUMIN QUANTITATIVE: CPT | Performed by: NURSE PRACTITIONER

## 2024-09-20 PROCEDURE — 83036 HEMOGLOBIN GLYCOSYLATED A1C: CPT | Performed by: NURSE PRACTITIONER

## 2024-09-20 PROCEDURE — 36415 COLL VENOUS BLD VENIPUNCTURE: CPT | Performed by: PATHOLOGY

## 2024-09-20 RX ORDER — HYDROXYZINE HYDROCHLORIDE 25 MG/1
25 TABLET, FILM COATED ORAL 3 TIMES DAILY PRN
Qty: 30 TABLET | Refills: 1 | Status: SHIPPED | OUTPATIENT
Start: 2024-09-20

## 2024-09-20 RX ORDER — OXYCODONE HYDROCHLORIDE 5 MG/1
5 TABLET ORAL 2 TIMES DAILY PRN
Qty: 12 TABLET | Refills: 0 | Status: SHIPPED | OUTPATIENT
Start: 2024-09-20 | End: 2024-09-26

## 2024-09-20 ASSESSMENT — PATIENT HEALTH QUESTIONNAIRE - PHQ9
10. IF YOU CHECKED OFF ANY PROBLEMS, HOW DIFFICULT HAVE THESE PROBLEMS MADE IT FOR YOU TO DO YOUR WORK, TAKE CARE OF THINGS AT HOME, OR GET ALONG WITH OTHER PEOPLE: SOMEWHAT DIFFICULT
SUM OF ALL RESPONSES TO PHQ QUESTIONS 1-9: 9
SUM OF ALL RESPONSES TO PHQ QUESTIONS 1-9: 9

## 2024-09-20 NOTE — PROGRESS NOTES
"  Assessment & Plan       Type 2 diabetes mellitus without complication, without long-term current use of insulin (H)  Overdue for routine labs, will check CMP, CBC, and A1c as well as microalbumin screening.  He is due for diabetic eye exam.    - OPTOMETRY REFERRAL  - HEMOGLOBIN A1C  - Albumin Random Urine Quantitative with Creat Ratio  - TSH with free T4 reflex  - Hemoglobin A1c  - CBC with platelets and differential  - Comprehensive metabolic panel (BMP + Alb, Alk Phos, ALT, AST, Total. Bili, TP)    Screen for colon cancer  Due for routine Colon Cancer Screening--he is overdue for screening.  - Colonoscopy Screening  Referral    Screening for HIV (human immunodeficiency virus)  Due for routine HIV screening, he is low-risk.  - HIV Screening    Need for hepatitis C screening test  Due for routine one-time Hep C screening per USPSTF guidelines.  - Hepatitis C Screen Reflex to HCV RNA Quant and Genotype    Testicular pain, left  Acute bilateral low back pain without sciatica  Spermatocele of left testicle.  Urology assessed patient, ortho/spine also involved.  Will defer to Urology for spermatocele management.  Reports severe left testicular pain, which he feels is different from the lower back pain; he will follow-up with the Spine team next week as scheduled.    - hydrOXYzine HCl (ATARAX) 25 MG tablet  Dispense: 30 tablet; Refill: 1  - oxyCODONE (ROXICODONE) 5 MG tablet  Dispense: 12 tablet; Refill: 0    History of tobacco abuse, Pulmonary nodules  Hx of pulmonary nodule, he has a long history of smoking and quit 2-3 years ago.    - CT Chest Lung Cancer Scrn Low Dose wo    Mild recurrent major depressive disorder (H)  Today Bill Wisdom reports passive thoughts of being \"better off dead\". In addition, he has notable risk factors for self-harm, including single status. However, risk is mitigated by [employer] beliefs and history of seeking help when needed. Therefore, based on all available evidence " "including the factors cited above, he does not appear to be at imminent risk for self-harm, does not meet criteria for a 72-hr hold, and therefore remains appropriate for ongoing outpatient level of care.  Re-address on follow-up.      Declines vaccines today.    Return in about 3 months (around 12/20/2024).    Becca Reed is a 54 year old, presenting for the following health issues:  Follow Up (Patient would like to get medication )        9/20/2024     1:41 PM   Additional Questions   Roomed by      History of Present Illness       Reason for visit:  Check up  Symptom onset:  More than a month  Symptoms include:  Pain  Symptom intensity:  Severe  Symptom progression:  Staying the same  Had these symptoms before:  Yes  Has tried/received treatment for these symptoms:  Yes  Previous treatment was successful:  No  What makes it worse:  Naomy bower  What makes it better:  Being still   He is taking medications regularly.         HPI  Pain began in the lower back bilaterally and left testicle at the same time in July 2024 from lifting a milk crate at work.  Pain is constant during the day and at noc.  9/10 in left testicle and lower back.  Feels severe testicular pressure on the left side.  Began taking Oxycodone following the ER visit with good relief, twice daily.  Urology prescribed Meloxicam PO with no relief.  Takes Tylenol and Ibuprofen BID without relief    Mood--endorses some thoughts of being better off dead, but adamantly denies plan to act on these thoughts or any thoughts of self-harm.  He states that he is \"still an achiever\" and has goals. He enjoys social media, teaching online, reads Bible.        Review of Systems  Constitutional, HEENT, cardiovascular, pulmonary, gi and gu systems are negative, except as otherwise noted.          Objective    /80 (BP Location: Right arm, Patient Position: Sitting, Cuff Size: Adult Large)   Pulse 75   Temp 97.6  F (36.4  C) (Oral)   Ht 1.651 m (5' 5\")   Wt " 115.6 kg (254 lb 12.8 oz)   SpO2 95%   BMI 42.40 kg/m    Body mass index is 42.4 kg/m .  Physical Exam   GENERAL: alert and no distress  EYES: Eyes grossly normal to inspection, PERRL and conjunctivae and sclerae normal  HENT: ear canals and TM's normal, nose and mouth without ulcers or lesions  NECK: no adenopathy, no asymmetry, masses, or scars  RESP: lungs clear to auscultation - no rales, rhonchi or wheezes  CV: regular rate and rhythm, normal S1 S2, no S3 or S4, no murmur, click or rub, no peripheral edema  ABDOMEN: soft, nontender, no hepatosplenomegaly, no masses and bowel sounds normal  MS: no gross musculoskeletal defects noted, no edema  SKIN: no suspicious lesions or rashes  NEURO: Normal strength and tone, mentation intact and speech normal  PSYCH: mentation appears normal, affect normal/bright  :   exam deferred    Madiha Baumann FNP Student    I saw and examined this patient with the NP student and agree with the student's findings and plan of care as documented in the student's note with the following modifications: none    Signed Electronically by: JUNIOR Cabello CNP  Lung Cancer Screening Shared Decision Making Visit     Bill Wisdom, a 54 year old male, is eligible for lung cancer screening    History   Smoking Status    Former    Types: Cigarettes   Smokeless Tobacco    Never       I have discussed with patient the risks and benefits of screening for lung cancer with low-dose CT.     The risks include:    radiation exposure: one low dose chest CT has as much ionizing radiation as about 15 chest x-rays, or 6 months of background radiation living in [place]      false positives: most findings/nodules are NOT cancer, but some might still require additional diagnostic evaluation, including biopsy    over-diagnosis: some slow growing cancers that might never have been clinically significant will be detected and treated unnecessarily     The benefit of early detection of lung  cancer is contingent upon adherence to annual screening or more frequent follow up if indicated.     Furthermore, to benefit from screening, Bill must be willing and able to undergo diagnostic procedures, if indicated. Although no specific guide is available for determining severity of comorbidities, it is reasonable to withhold screening in patients who have greater mortality risk from other diseases.     We did discuss that the best way to prevent lung cancer is to not smoke.    Some patients may value a numeric estimation of lung cancer risk when evaluating if lung cancer screening is right for them, here is one calculator:    ShouldIScreen

## 2024-09-20 NOTE — PATIENT INSTRUCTIONS
Lung Cancer Screening   Frequently Asked Questions  If you are at high-risk for lung cancer, getting screened with low-dose computed tomography (LDCT) every year can help save your life. This handout offers answers to some of the most common questions about lung cancer screening. If you have other questions, please call [phone] ([phone]).     What is it?  Lung cancer screening uses special X-ray technology to create an image of your lung tissue. The exam is quick and easy and takes less than 10 seconds. We don t give you any medicine or use any needles. You can eat before and after the exam. You don t need to change your clothes as long as the clothing on your chest doesn t contain metal. But, you do need to be able to hold your breath for at least 6 seconds during the exam.    What is the goal of lung cancer screening?  The goal of lung cancer screening is to save lives. Many times, lung cancer is not found until a person starts having physical symptoms. Lung cancer screening can help detect lung cancer in the earliest stages when it may be easier to treat.    Who should be screened for lung cancer?  We suggest lung cancer screening for anyone who is at high-risk for lung cancer. You are in the high-risk group if you:      are between the ages of 55 and 79, and    have smoked at least 1 pack of cigarettes a day for 20 or more years, and    still smoke or have quit within the past 15 years.    However, if you have a new cough or shortness of breath, you should talk to your doctor before being screened.    Why does it matter if I have symptoms?  Certain symptoms can be a sign that you have a condition in your lungs that should be checked and treated by your doctor. These symptoms include fever, chest pain, a new or changing cough, shortness of breath that you have never felt before, coughing up blood or unexplained weight loss. Having any of these symptoms can greatly affect the results of lung  cancer screening.       Should all smokers get an LDCT lung cancer screening exam?  It depends. Lung cancer screening is for a very specific group of men and women who have a history of heavy smoking over a long period of time (see  Who should be screened for lung cancer  above).  I am in the high-risk group, but have been diagnosed with cancer in the past. Is LDCT lung cancer screening right for me?  In some cases, you should not have LDCT lung screening, such as when your doctor is already following your cancer with CT scan studies. Your doctor will help you decide if LDCT lung screening is right for you.  Do I need to have a screening exam every year?  Yes. If you are in the high-risk group described earlier, you should get an LDCT lung cancer screening exam every year until you are 79, or are no longer willing or able to undergo screening and possible procedures to diagnose and treat lung cancer.  How effective is LDCT at preventing death from lung cancer?  Studies have shown that LDCT lung cancer screening can lower the risk of death from lung cancer by 20 percent in people who are at high-risk.  What are the risks?  There are some risks and limitations of LDCT lung cancer screening. We want to make sure you understand the risks and benefits, so please let us know if you have any questions. Your doctor may want to talk with you more about these risks.    Radiation exposure: As with any exam that uses radiation, there is a very small increased risk of cancer. The amount of radiation in LDCT is small--about the same amount a person would get from a mammogram. Your doctor orders the exam when he or she feels the potential benefits outweigh the risks.    False negatives: No test is perfect, including LDCT. It is possible that you may have a medical condition, including lung cancer, that is not found during your exam. This is called a false negative result.    False positives and more testing: LDCT very often finds  something in the lung that could be cancer, but in fact is not. This is called a false positive result. False positive tests often cause anxiety. To make sure these findings are not cancer, you may need to have more tests. These tests will be done only if you give us permission. Sometimes patients need a treatment that can have side effects, such as a biopsy. For more information on false positives, see  What can I expect from the results?     Findings not related to lung cancer: Your LDCT exam also takes pictures of areas of your body next to your lungs. In a very small number of cases, the CT scan will show an abnormal finding in one of these areas, such as your kidneys, adrenal glands, liver or thyroid. This finding may not be serious, but you may need more tests. Your doctor can help you decide what other tests you may need, if any.  What can I expect from the results?  About 1 out of 4 LDCT exams will find something that may need more tests. Most of the time, these findings are lung nodules. Lung nodules are very small collections of tissue in the lung. These nodules are very common, and the vast majority--more than 97 percent--are not cancer (benign). Most are normal lymph nodes or small areas of scarring from past infections.  But, if a small lung nodule is found to be cancer, the cancer can be cured more than 90 percent of the time. To know if the nodule is cancer, we may need to get more images before your next yearly screening exam. If the nodule has suspicious features (for example, it is large, has an odd shape or grows over time), we will refer you to a specialist for further testing.  Will my doctor also get the results?  Yes. Your doctor will get a copy of your results.  Is it okay to keep smoking now that there s a cancer screening exam?  No. Tobacco is one of the strongest cancer-causing agents. It causes not only lung cancer, but other cancers and cardiovascular (heart) diseases as well. The damage  caused by smoking builds over time. This means that the longer you smoke, the higher your risk of disease. While it is never too late to quit, the sooner you quit, the better.  Where can I find help to quit smoking?  The best way to prevent lung cancer is to stop smoking. If you have already quit smoking, congratulations and keep it up! For help on quitting smoking, please call [employer] at 2-441-QUITNOW (1-641.911.8539) or the American [employer] Society at 1-480.884.8574 to find local resources near you.  One-on-one health coaching:  If you d prefer to work individually with a health care provider on tobacco cessation, we offer:      Medication Therapy Management:  Our specially trained pharmacists work closely with you and your doctor to help you quit smoking.  Call [phone] or [phone] (toll free).

## 2024-09-24 NOTE — TELEPHONE ENCOUNTER
SPINE PATIENTS - NEW PROTOCOL PREVISIT    RECORDS RECEIVED FROM: Referred by Dioni Kidd MD   REASON FOR VISIT: Acute midline low back pain without sciatica   PROVIDER: Roselia   DATE OF APPT: 09/25/2024   NOTES (FOR ALL VISITS) STATUS DETAILS   OFFICE NOTE from referring provider Internal Referral and notes in chart   NO WORK-UP  No imaging/no prev work-up Internal XR Lumbar 07/24/2024

## 2024-09-25 ENCOUNTER — OFFICE VISIT (OUTPATIENT)
Dept: NEUROSURGERY | Facility: CLINIC | Age: 54
End: 2024-09-25
Attending: PHYSICIAN ASSISTANT

## 2024-09-25 ENCOUNTER — PRE VISIT (OUTPATIENT)
Dept: NEUROSURGERY | Facility: CLINIC | Age: 54
End: 2024-09-25
Payer: COMMERCIAL

## 2024-09-25 VITALS
WEIGHT: 254 LBS | OXYGEN SATURATION: 99 % | BODY MASS INDEX: 42.27 KG/M2 | HEART RATE: 60 BPM | DIASTOLIC BLOOD PRESSURE: 85 MMHG | SYSTOLIC BLOOD PRESSURE: 131 MMHG

## 2024-09-25 DIAGNOSIS — M47.816 FACET ARTHROPATHY, LUMBAR: ICD-10-CM

## 2024-09-25 DIAGNOSIS — M54.50 ACUTE MIDLINE LOW BACK PAIN WITHOUT SCIATICA: Primary | ICD-10-CM

## 2024-09-25 PROCEDURE — 99213 OFFICE O/P EST LOW 20 MIN: CPT | Performed by: PHYSICIAN ASSISTANT

## 2024-09-25 PROCEDURE — 99204 OFFICE O/P NEW MOD 45 MIN: CPT | Performed by: PHYSICIAN ASSISTANT

## 2024-09-25 ASSESSMENT — PAIN SCALES - GENERAL: PAINLEVEL: EXTREME PAIN (9)

## 2024-09-25 NOTE — NURSING NOTE
"Bill Wisdom is a 54 year old male who presents for:  Chief Complaint   Patient presents with    Consult        Vitals:    Vitals:    09/25/24 1431   BP: 131/85   Pulse: 60   SpO2: 99%   Weight: 254 lb (115.2 kg)       BMI:  Estimated body mass index is 42.27 kg/m  as calculated from the following:    Height as of 9/20/24: 5' 5\" (1.651 m).    Weight as of this encounter: 254 lb (115.2 kg).    Pain Score:  Extreme Pain (9)        Amendo Phorn      "

## 2024-09-25 NOTE — PATIENT INSTRUCTIONS
-Evaluated for lumbar spine pain with left testicular pain after lifting a crate of milk cartons at work. This is a workman's compensation case.     -Unlikely related to lumbosacral spine.     -Has a CT of abdomen and pelvis this Saturday. We will review images once completed.     -Once you have your imaging, please contact our office to schedule a follow-up appointment either over the phone or in person. You can contact my office at [phone]. Since you are scheduling the imaging studies, I will not be notified when they have been completed.     -Exam is normal.       SHAZIA Hernandez, NATALIYA  [facility] Neurosurgery  [facility]     Tel: [phone]  Fax: [phone]

## 2024-09-25 NOTE — LETTER
"9/25/2024      Bill Wisdom  [address]  [place] [address]      Dear Colleague,    Thank you for referring your patient, Bill Wisdom, to the [facility] NEUROSURGERY [facility]INIC [place]. Please see a copy of my visit note below.    NEUROSURGERY CLINIC CONSULT NOTE     DATE OF VISIT: 9/25/2024     SUBJECTIVE:     Bill Wisdom is a pleasant 54 year old male who presents to the clinic today for consultation on lumbar spine pain with left testicular pain. He is referred to the Neurosurgery Clinic by Dr. Kidd in Primary Care.     This is a workman's compensation case.     Today, he reports a two-month history of symptoms. He describes a daily with fluctuating intensity, sharp, aching, \"vicelike\" pain that initiates in the low lumbar / facet region as well as his left testicle. This pain is not accompanied by paresthesia, numbness or perceived weakness. Any type of mobility seems to aggravate the symptoms, while alleviation is obtained by sitting and lying down. 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 There are no bowel or bladder changes. He denies saddle anesthesia. He denies changes in gait, instability, or falling episodes.     He was prescribed physical therapy, but has not participated yet.        Current Outpatient Medications:      acetaminophen (TYLENOL) 500 MG tablet, Take 1-2 tablets (500-1,000 mg) by mouth every 6 hours as needed for mild pain or fever, Disp: 100 tablet, Rfl: 0     albuterol (PROVENTIL HFA) 108 (90 Base) MCG/ACT inhaler, Inhale 2 puffs into the lungs every 6 hours as needed for shortness of breath or wheezing *Pharmacy may dispense brand covered by insurance (Proair, or proventil or ventolin or generic albuterol inhaler), Disp: 8.5 g, Rfl: 0     albuterol (PROVENTIL) (2.5 MG/3ML) 0.083% neb solution, Take 1 vial (2.5 mg) by nebulization every 6 hours as needed for shortness of breath or wheezing, Disp: 60 mL, Rfl: 0     " allopurinol (ZYLOPRIM) 100 MG tablet, Take 1 tablet (100 mg) by mouth daily, Disp: 90 tablet, Rfl: 3     Cholecalciferol (VITAMIN D3) 75 MCG (3000 UT) TABS, Take 1 tablet by mouth daily, Disp: 90 tablet, Rfl: 3     cyclobenzaprine (FLEXERIL) 10 MG tablet, Take 1 tablet (10 mg) by mouth 3 times daily as needed for muscle spasms (Patient not taking: Reported on 9/10/2024), Disp: 20 tablet, Rfl: 0     hydrOXYzine HCl (ATARAX) 25 MG tablet, Take 1 tablet (25 mg) by mouth 3 times daily as needed for itching, anxiety or other (pain)., Disp: 30 tablet, Rfl: 1     ibuprofen (ADVIL/MOTRIN) 600 MG tablet, Take 1 tablet (600 mg) by mouth every 6 hours as needed for moderate pain., Disp: 30 tablet, Rfl: 0     ibuprofen (ADVIL/MOTRIN) 600 MG tablet, Take 1 tablet (600 mg) by mouth every 6 hours as needed for moderate pain (4-6), Disp: 120 tablet, Rfl: 1     meloxicam (MOBIC) 15 MG tablet, Take 1 tablet (15 mg) by mouth daily for 14 days., Disp: 14 tablet, Rfl: 0     Multiple Vitamins-Minerals (MULTIVITAMIN ADULT) CHEW, Take 1 chew tab by mouth daily (Patient not taking: Reported on 9/10/2024), Disp: 60 tablet, Rfl: 5     naproxen (NAPROSYN) 500 MG tablet, Take 1 tablet (500 mg) by mouth 2 times daily as needed for moderate pain, Disp: 30 tablet, Rfl: 1     order for DME, Equipment being ordered: Nebulizer and equipment, Disp: 1 each, Rfl: 0     order for DME, Equipment being ordered: Cane, Disp: 1 Device, Rfl: 0     oxyCODONE (ROXICODONE) 5 MG tablet, Take 1 tablet (5 mg) by mouth 2 times daily as needed for moderate to severe pain., Disp: 12 tablet, Rfl: 0     tiZANidine (ZANAFLEX) 4 MG capsule, Take 1 capsule (4 mg) by mouth 2 times daily as needed for muscle spasms (Patient not taking: Reported on 9/10/2024), Disp: 30 capsule, Rfl: 1     topiramate (TOPAMAX) 100 MG tablet, Take 1 tablet (100 mg) by mouth daily (with dinner) (Patient not taking: Reported on 9/10/2024), Disp: 30 tablet, Rfl: 3     traZODone (DESYREL) 100 MG  tablet, Take 100 mg by mouth At Bedtime (Patient not taking: Reported on 9/10/2024), Disp: , Rfl:      No Known Allergies     Past Medical History:   Diagnosis Date     COPD (chronic obstructive pulmonary disease) (H)      Gastro-oesophageal reflux disease      History of tobacco abuse      Obese      CHANDLER (obstructive sleep apnea)      Prediabetes         ROS: 10 point review of symptoms are negative other than the symptoms noted above in the HPI.     Family History has been reviewed with the patient, there are no pertinent findings to presenting concern.     Past Surgical History:   Procedure Laterality Date     HYDROCELECTOMY SCROTAL  2006     INJECT SACROILIAC JOINT Right 2019    Procedure: Right Sacroiliac Joint Injection;  Surgeon: Sina Laboy MD;  Location: [facility] OR     SPERMATOCELECTOMY  3/20/2012    Procedure:SPERMATOCELECTOMY; Left Spermatocelectomy; Surgeon:DELLA CROW; Location: OR        Social History     Tobacco Use     Smoking status: Former     Current packs/day: 0.00     Types: Cigarettes     Quit date: 2019     Years since quittin.6     Smokeless tobacco: Never   Substance Use Topics     Alcohol use: No     Comment: quit drinking 2019     Drug use: Not Currently     Types: Marijuana     Comment: Last used 2 days ago        OBJECTIVE:   /85   Pulse 60   Wt 115.2 kg (254 lb)   SpO2 99%   BMI 42.27 kg/m     Body mass index is 42.27 kg/m .     Imaging:     XR LUMBAR SPINE 3 VIEWS   LOCATION: [facility]   DATE: 2024     INDICATION: Sudden onset low back pain, PAIN   COMPARISON: Lumbar spine radiographs: 6/3/2024.     IMPRESSION:     1. No radiographic evidence of acute fracture involving the lumbar spine. Vertebral body heights are within normal limits and unchanged from radiographs performed in 2024.   2.  Alignment is similar to prior with grade 1 anterolisthesis of L3 on L4.   3.  Mild multilevel degenerative disc and facet disease.   4.   Mild calcified atherosclerosis of the infrarenal abdominal aorta.     US TESTICULAR AND SCROTUM WITH DOPPLER LIMITED 9/5/2024 11:05 AM     CLINICAL HISTORY: Left testicular pain and swelling.  TECHNIQUE: Ultrasound of scrotum with color flow and spectral Doppler  with waveform analysis performed.  COMPARISON: Testicular ultrasound 8/29/2024.     FINDINGS:     RIGHT: Right testicle measures 4.6 x 2.9 x 2.8 cm. Normal testicle  with no masses. Normal arterial duplex and normal color flow. Normal  epididymis. No hydrocele. No varicocele.     LEFT: Left testicle measures 4 x 2.6 x 3 cm. Small intratesticular  cyst on the left inferiorly measures 0.7 cm, unchanged. Otherwise  unremarkable testicle with no masses. Normal arterial duplex and  normal color flow. Multiseptated extratesticular cystic lesion likely  originates from the left epididymis, measuring 7.8 x 3.9 x 4.1 cm,  similar to the previous exam. No hydrocele. No varicocele.                                                               IMPRESSION:  1.  No significant interval change. No convincing sonographic evidence  for testicular torsion.  2.  Large multiseptated extratesticular cystic lesion on the left is  not significantly changed, and is likely a spermatocele originating  from the left epididymis.     ELIEL HIGHTOWER MD     Full radiological report in chart. Imaging was reviewed with with patient today.     Exam:     Patient appears comfortable, conversational, and in no apparent distress.   Head: Normocephalic, without obvious abnormality, atraumatic, no facial asymmetry.   Eyes: conjunctivae/corneas clear. PERRL, EOM's intact.   Throat: lips, mucosa, and tongue normal; teeth and gums normal.   Neck: supple, symmetrical, trachea midline, no adenopathy and thyroid: not enlarged, symmetric, no tenderness/mass/nodules.   Lungs: clear to auscultation bilaterally.   Heart: regular rate and rhythm.   Abdomen: soft, non-tender; bowel sounds normal; no  "masses, no organomegaly.   Pulses: 2+ and symmetric.   Skin: Skin color, texture, turgor normal. No rashes or lesions.     CN II-XII grossly intact, alert and appropriate with conversation and following commands.   Gait is non-antalgic. Able to tandem walk. Able to walk on toes and heels without difficulty.   Cervical spine is non tender to palpation. Appropriate range of motion of neck, not concerning for lhermitte's phenomenon.   Bilateral bicep 2/4 and tricep reflexes 1/4. Sensation intact throughout upper extremities.     UE muscle strength  Right  Left    Deltoid  5/5  5/5    Biceps  5/5  5/5    Triceps  5/5  5/5    Hand intrinsics  5/5  5/5    Hand grasp  5/5  5/5    Soria signs  neg  neg      Lumbar spine is non tender to palpation.  Intact sensation throughout lower extremities.   Bilateral patellar 2/4 and achilles reflex 1/4. Negative for pain with straight leg raise.     LE muscle strength  Right  Left    Iliopsoas (hip flexion)  5/5  5/5    Quad (knee extension)  5/5  5/5    Hamstring (knee flexion)  5/5  5/5    Gastrocnemius (PF)  5/5  5/5    Tibialis Ant. (DF)  5/5  5/5    EHL  5/5  5/5      Negative Babinski bilaterally. Negative for clonus.   Calves are soft and non-tender bilaterally.       ASSESSMENT/PLAN:     Bill Wisdom is a 54 year old male who presents to the clinic for consultation on a two-month history of a daily with fluctuating intensity, sharp, aching, \"vicelike\" pain that initiates in the low lumbar / facet region as well as his left testicle. This pain is not accompanied by paresthesia, numbness or perceived weakness.     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     The patient's most recent imaging was reviewed with him today. It was explained that images do not show any radiographic evidence of acute fracture involving the lumbar spine, a stable grade 1 anterolisthesis of L3 on L4 and mild multilevel degenerative disc and facet disease. " These findings do not correlates to his subjective concerns. Typically testicular pain would be more of a L1-2 level pathology.    Interestingly, his testicular / scrotum doppler and ultrasound show a large multiseptated extratesticular cystic lesion on the left and is likely a spermatocele originating from the left epididymis.     On exam, he is noted to have appropriate strength, sensation and range of motion.     He does have a abdomen / pelvis CT scan scheduled for this coming Saturday. We have agreed to review the results once completed. We also discussed signs of a worsening problem that he should seek being evaluated.        Respectfully,     SHAZIA Hernandez PA-C  [facility] Neurosurgery  [facility]  Tel: [phone]      Dr. Alanna dias.       Again, thank you for allowing me to participate in the care of your patient.        Sincerely,        Leonid Veloz PA-C

## 2024-09-25 NOTE — PROGRESS NOTES
"NEUROSURGERY CLINIC CONSULT NOTE     DATE OF VISIT: 9/25/2024     SUBJECTIVE:     Bill Wisdom is a pleasant 54 year old male who presents to the clinic today for consultation on lumbar spine pain with left testicular pain. He is referred to the Neurosurgery Clinic by Dr. Kidd in Primary Care.     This is a workman's compensation case.     Today, he reports a two-month history of symptoms. He describes a daily with fluctuating intensity, sharp, aching, \"vicelike\" pain that initiates in the low lumbar / facet region as well as his left testicle. This pain is not accompanied by paresthesia, numbness or perceived weakness. Any type of mobility seems to aggravate the symptoms, while alleviation is obtained by sitting and lying down. 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 There are no bowel or bladder changes. He denies saddle anesthesia. He denies changes in gait, instability, or falling episodes.     He was prescribed physical therapy, but has not participated yet.        Current Outpatient Medications:     acetaminophen (TYLENOL) 500 MG tablet, Take 1-2 tablets (500-1,000 mg) by mouth every 6 hours as needed for mild pain or fever, Disp: 100 tablet, Rfl: 0    albuterol (PROVENTIL HFA) 108 (90 Base) MCG/ACT inhaler, Inhale 2 puffs into the lungs every 6 hours as needed for shortness of breath or wheezing *Pharmacy may dispense brand covered by insurance (Proair, or proventil or ventolin or generic albuterol inhaler), Disp: 8.5 g, Rfl: 0    albuterol (PROVENTIL) (2.5 MG/3ML) 0.083% neb solution, Take 1 vial (2.5 mg) by nebulization every 6 hours as needed for shortness of breath or wheezing, Disp: 60 mL, Rfl: 0    allopurinol (ZYLOPRIM) 100 MG tablet, Take 1 tablet (100 mg) by mouth daily, Disp: 90 tablet, Rfl: 3    Cholecalciferol (VITAMIN D3) 75 MCG (3000 UT) TABS, Take 1 tablet by mouth daily, Disp: 90 tablet, Rfl: 3    cyclobenzaprine (FLEXERIL) 10 MG tablet, Take 1 " tablet (10 mg) by mouth 3 times daily as needed for muscle spasms (Patient not taking: Reported on 9/10/2024), Disp: 20 tablet, Rfl: 0    hydrOXYzine HCl (ATARAX) 25 MG tablet, Take 1 tablet (25 mg) by mouth 3 times daily as needed for itching, anxiety or other (pain)., Disp: 30 tablet, Rfl: 1    ibuprofen (ADVIL/MOTRIN) 600 MG tablet, Take 1 tablet (600 mg) by mouth every 6 hours as needed for moderate pain., Disp: 30 tablet, Rfl: 0    ibuprofen (ADVIL/MOTRIN) 600 MG tablet, Take 1 tablet (600 mg) by mouth every 6 hours as needed for moderate pain (4-6), Disp: 120 tablet, Rfl: 1    meloxicam (MOBIC) 15 MG tablet, Take 1 tablet (15 mg) by mouth daily for 14 days., Disp: 14 tablet, Rfl: 0    Multiple Vitamins-Minerals (MULTIVITAMIN ADULT) CHEW, Take 1 chew tab by mouth daily (Patient not taking: Reported on 9/10/2024), Disp: 60 tablet, Rfl: 5    naproxen (NAPROSYN) 500 MG tablet, Take 1 tablet (500 mg) by mouth 2 times daily as needed for moderate pain, Disp: 30 tablet, Rfl: 1    order for DME, Equipment being ordered: Nebulizer and equipment, Disp: 1 each, Rfl: 0    order for DME, Equipment being ordered: Cane, Disp: 1 Device, Rfl: 0    oxyCODONE (ROXICODONE) 5 MG tablet, Take 1 tablet (5 mg) by mouth 2 times daily as needed for moderate to severe pain., Disp: 12 tablet, Rfl: 0    tiZANidine (ZANAFLEX) 4 MG capsule, Take 1 capsule (4 mg) by mouth 2 times daily as needed for muscle spasms (Patient not taking: Reported on 9/10/2024), Disp: 30 capsule, Rfl: 1    topiramate (TOPAMAX) 100 MG tablet, Take 1 tablet (100 mg) by mouth daily (with dinner) (Patient not taking: Reported on 9/10/2024), Disp: 30 tablet, Rfl: 3    traZODone (DESYREL) 100 MG tablet, Take 100 mg by mouth At Bedtime (Patient not taking: Reported on 9/10/2024), Disp: , Rfl:      No Known Allergies     Past Medical History:   Diagnosis Date    COPD (chronic obstructive pulmonary disease) (H)     Gastro-oesophageal reflux disease     History of tobacco  abuse     Obese     CHANDLER (obstructive sleep apnea)     Prediabetes         ROS: 10 point review of symptoms are negative other than the symptoms noted above in the HPI.     Family History has been reviewed with the patient, there are no pertinent findings to presenting concern.     Past Surgical History:   Procedure Laterality Date    HYDROCELECTOMY SCROTAL  2006    INJECT SACROILIAC JOINT Right 2019    Procedure: Right Sacroiliac Joint Injection;  Surgeon: Sina Laboy MD;  Location: [facility] OR    SPERMATOCELECTOMY  3/20/2012    Procedure:SPERMATOCELECTOMY; Left Spermatocelectomy; Surgeon:DELLA CROW; Location: OR        Social History     Tobacco Use    Smoking status: Former     Current packs/day: 0.00     Types: Cigarettes     Quit date: 2019     Years since quittin.6    Smokeless tobacco: Never   Substance Use Topics    Alcohol use: No     Comment: quit drinking 2019    Drug use: Not Currently     Types: Marijuana     Comment: Last used 2 days ago        OBJECTIVE:   /85   Pulse 60   Wt 115.2 kg (254 lb)   SpO2 99%   BMI 42.27 kg/m     Body mass index is 42.27 kg/m .     Imaging:     XR LUMBAR SPINE 3 VIEWS   LOC[facility]: [facility]   DATE: 2024     INDICATION: Sudden onset low back pain, PAIN   COMPARISON: Lumbar spine radiographs: 6/3/2024.     IMPRESSION:     1. No radiographic evidence of acute fracture involving the lumbar spine. Vertebral body heights are within normal limits and unchanged from radiographs performed in 2024.   2.  Alignment is similar to prior with grade 1 anterolisthesis of L3 on L4.   3.  Mild multilevel degenerative disc and facet disease.   4.  Mild calcified atherosclerosis of the infrarenal abdominal aorta.     US TESTICULAR AND SCROTUM WITH DOPPLER LIMITED 2024 11:05 AM     CLINICAL HISTORY: Left testicular pain and swelling.  TECHNIQUE: Ultrasound of scrotum with color flow and spectral Doppler  with waveform analysis  performed.  COMPARISON: Testicular ultrasound 8/29/2024.     FINDINGS:     RIGHT: Right testicle measures 4.6 x 2.9 x 2.8 cm. Normal testicle  with no masses. Normal arterial duplex and normal color flow. Normal  epididymis. No hydrocele. No varicocele.     LEFT: Left testicle measures 4 x 2.6 x 3 cm. Small intratesticular  cyst on the left inferiorly measures 0.7 cm, unchanged. Otherwise  unremarkable testicle with no masses. Normal arterial duplex and  normal color flow. Multiseptated extratesticular cystic lesion likely  originates from the left epididymis, measuring 7.8 x 3.9 x 4.1 cm,  similar to the previous exam. No hydrocele. No varicocele.                                                               IMPRESSION:  1.  No significant interval change. No convincing sonographic evidence  for testicular torsion.  2.  Large multiseptated extratesticular cystic lesion on the left is  not significantly changed, and is likely a spermatocele originating  from the left epididymis.     ELIEL HIGHTOWER MD     Full radiological report in chart. Imaging was reviewed with with patient today.     Exam:     Patient appears comfortable, conversational, and in no apparent distress.   Head: Normocephalic, without obvious abnormality, atraumatic, no facial asymmetry.   Eyes: conjunctivae/corneas clear. PERRL, EOM's intact.   Throat: lips, mucosa, and tongue normal; teeth and gums normal.   Neck: supple, symmetrical, trachea midline, no adenopathy and thyroid: not enlarged, symmetric, no tenderness/mass/nodules.   Lungs: clear to auscultation bilaterally.   Heart: regular rate and rhythm.   Abdomen: soft, non-tender; bowel sounds normal; no masses, no organomegaly.   Pulses: 2+ and symmetric.   Skin: Skin color, texture, turgor normal. No rashes or lesions.     CN II-XII grossly intact, alert and appropriate with conversation and following commands.   Gait is non-antalgic. Able to tandem walk. Able to walk on toes and heels  "without difficulty.   Cervical spine is non tender to palpation. Appropriate range of motion of neck, not concerning for lhermitte's phenomenon.   Bilateral bicep 2/4 and tricep reflexes 1/4. Sensation intact throughout upper extremities.     UE muscle strength  Right  Left    Deltoid  5/5  5/5    Biceps  5/5  5/5    Triceps  5/5  5/5    Hand intrinsics  5/5  5/5    Hand grasp  5/5  5/5    Soria signs  neg  neg      Lumbar spine is non tender to palpation.  Intact sensation throughout lower extremities.   Bilateral patellar 2/4 and achilles reflex 1/4. Negative for pain with straight leg raise.     LE muscle strength  Right  Left    Iliopsoas (hip flexion)  5/5  5/5    Quad (knee extension)  5/5  5/5    Hamstring (knee flexion)  5/5  5/5    Gastrocnemius (PF)  5/5  5/5    Tibialis Ant. (DF)  5/5  5/5    EHL  5/5  5/5      Negative Babinski bilaterally. Negative for clonus.   Calves are soft and non-tender bilaterally.       ASSESSMENT/PLAN:     Bill Wisdom is a 54 year old male who presents to the clinic for consultation on a two-month history of a daily with fluctuating intensity, sharp, aching, \"vicelike\" pain that initiates in the low lumbar / facet region as well as his left testicle. This pain is not accompanied by paresthesia, numbness or perceived weakness.     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     The patient's most recent imaging was reviewed with him today. It was explained that images do not show any radiographic evidence of acute fracture involving the lumbar spine, a stable grade 1 anterolisthesis of L3 on L4 and mild multilevel degenerative disc and facet disease. These findings do not correlates to his subjective concerns. Typically testicular pain would be more of a L1-2 level pathology.    Interestingly, his testicular / scrotum doppler and ultrasound show a large multiseptated extratesticular cystic lesion on the left and is likely a spermatocele " originating from the left epididymis.     On exam, he is noted to have appropriate strength, sensation and range of motion.     He does have a abdomen / pelvis CT scan scheduled for this coming Saturday. We have agreed to review the results once completed. We also discussed signs of a worsening problem that he should seek being evaluated.        Respectfully,     SHAZIA Hernandez, NATALIYA  [facility] Neurosurgery  [facility]  Tel: [phone]      Dr. Alanna dias.

## 2024-09-30 ENCOUNTER — ANCILLARY PROCEDURE (OUTPATIENT)
Dept: CT IMAGING | Facility: CLINIC | Age: 54
End: 2024-09-30
Attending: STUDENT IN AN ORGANIZED HEALTH CARE EDUCATION/TRAINING PROGRAM
Payer: COMMERCIAL

## 2024-09-30 PROCEDURE — 74177 CT ABD & PELVIS W/CONTRAST: CPT | Mod: GC | Performed by: STUDENT IN AN ORGANIZED HEALTH CARE EDUCATION/TRAINING PROGRAM

## 2024-09-30 RX ORDER — IOPAMIDOL 755 MG/ML
124 INJECTION, SOLUTION INTRAVASCULAR ONCE
Status: COMPLETED | OUTPATIENT
Start: 2024-09-30 | End: 2024-09-30

## 2024-09-30 RX ADMIN — IOPAMIDOL 124 ML: 755 INJECTION, SOLUTION INTRAVASCULAR at 12:06

## 2024-09-30 NOTE — DISCHARGE INSTRUCTIONS

## 2024-10-08 ENCOUNTER — TELEPHONE (OUTPATIENT)
Dept: SURGERY | Facility: CLINIC | Age: 54
End: 2024-10-08
Payer: COMMERCIAL

## 2024-10-08 NOTE — TELEPHONE ENCOUNTER
Attempted return call to patient for Scrotal pain. Unable to reach mailbox full. Will send Divshot message with information.

## 2024-10-14 ENCOUNTER — TELEPHONE (OUTPATIENT)
Dept: UROLOGY | Facility: CLINIC | Age: 54
End: 2024-10-14
Payer: COMMERCIAL

## 2024-10-14 NOTE — TELEPHONE ENCOUNTER
[facility] Call Center    Phone Message    May a detailed message be left on voicemail: yes     Reason for Call: Other: Pt is in pain and wants to get in sooner appointment. Please call pt back. Thanks.     Action Taken: Other: [place]  UROLOGY    Travel Screening: Not Applicable     Date of Service:

## 2024-10-14 NOTE — TELEPHONE ENCOUNTER
10/2/2024: Your CT scan does not have any major abnormalities in the abdomen. If you continuing to have sever pain, please come back to clinic for a re-evaluation.      I would highly recommend coming to the [facility] location so we can perform a cord block and see if you have pain relief before we can commit to any procedure down the road.      Dioni Kidd MD   to Derek GRESHAM Alyx       10/8/24  4:06 PM  I tried to call you back twice but no answer and your mailbox is full.      If you are still having pain please come to clinic. We need to discuss a spermatic cord block procedure before we can go ahead with surgery.  ____________________      Provider no longer in [place] today and is back at [facility] clinic tomorrow.    Will forward to [facility] team for clinic appt consideration.    Hazel STROUD   Specialty Clinic RN

## 2024-10-16 ENCOUNTER — HOSPITAL ENCOUNTER (EMERGENCY)
Facility: HOSPITAL | Age: 54
Discharge: HOME | End: 2024-10-16
Attending: EMERGENCY MEDICINE | Admitting: EMERGENCY MEDICINE
Payer: COMMERCIAL

## 2024-10-16 VITALS
DIASTOLIC BLOOD PRESSURE: 69 MMHG | OXYGEN SATURATION: 99 % | BODY MASS INDEX: 40.66 KG/M2 | RESPIRATION RATE: 18 BRPM | HEART RATE: 65 BPM | WEIGHT: 253 LBS | TEMPERATURE: 98 F | HEIGHT: 66 IN | SYSTOLIC BLOOD PRESSURE: 134 MMHG

## 2024-10-16 DIAGNOSIS — N43.40 SPERMATOCELE: ICD-10-CM

## 2024-10-16 PROCEDURE — 250N000013 HC RX MED GY IP 250 OP 250 PS 637: Performed by: EMERGENCY MEDICINE

## 2024-10-16 PROCEDURE — 99283 EMERGENCY DEPT VISIT LOW MDM: CPT | Performed by: EMERGENCY MEDICINE

## 2024-10-16 RX ORDER — OXYCODONE HYDROCHLORIDE 5 MG/1
5 TABLET ORAL EVERY 6 HOURS PRN
Qty: 15 TABLET | Refills: 0 | Status: SHIPPED | OUTPATIENT
Start: 2024-10-16 | End: 2024-10-20

## 2024-10-16 RX ORDER — OXYCODONE HYDROCHLORIDE 5 MG/1
5 TABLET ORAL ONCE
Status: COMPLETED | OUTPATIENT
Start: 2024-10-16 | End: 2024-10-16

## 2024-10-16 RX ADMIN — OXYCODONE HYDROCHLORIDE 5 MG: 5 TABLET ORAL at 13:15

## 2024-10-16 ASSESSMENT — COLUMBIA-SUICIDE SEVERITY RATING SCALE - C-SSRS
6. HAVE YOU EVER DONE ANYTHING, STARTED TO DO ANYTHING, OR PREPARED TO DO ANYTHING TO END YOUR LIFE?: NO
1. IN THE PAST MONTH, HAVE YOU WISHED YOU WERE DEAD OR WISHED YOU COULD GO TO SLEEP AND NOT WAKE UP?: NO
2. HAVE YOU ACTUALLY HAD ANY THOUGHTS OF KILLING YOURSELF IN THE PAST MONTH?: NO

## 2024-10-16 ASSESSMENT — ACTIVITIES OF DAILY LIVING (ADL)
ADLS_ACUITY_SCORE: 35
ADLS_ACUITY_SCORE: 35

## 2024-10-16 NOTE — ED NOTES
[facility] rx [facility] to [facility] [address] in [facility], noted written rx ready and pt states that's ok I'll just take that since you already have it.

## 2024-10-16 NOTE — ED PROVIDER NOTES
EMERGENCY DEPARTMENT ENCOUNTER      NAME: Bill Wisdom  AGE: 54 year old male  YOB: 1970  MRN: [id number]  EVALUATION DATE & TIME: No admission date for patient encounter.    PCP: Amalia Hernandez    ED PROVIDER: Tommy Orta M.D.      Chief Complaint   Patient presents with    Back Pain         FINAL IMPRESSION:  1. Spermatocele          ED COURSE & MEDICAL DECISION MAKING:    Pertinent Labs & Imaging studies reviewed. (See chart for details)  54 year old male presents to the Emergency Department for evaluation of testicle pain. Patient appears non toxic with stable vitals signs, patient afebrile with no tachycardia or hypoxia.  Patient appears uncomfortable but otherwise lungs are clear and abdomen is benign,  exam significant for enlarged left testicle but appears in normal vertical lie, no skin changes, no urethral discharge.  Per review of the medical record, did review urology progress note from 9/10/2024 through [facility] urology clinic with Dr. Dioni Davison, patient has noted history of spermatocele, plan at that time was to have the patient follow-up with Ortho consult and if pain persistent consider reoperative surgery.  Did discuss patient case with pharmacy who reviewed outpatient prescription medications and patient was last dosed with narcotics on 9/20/2024 for 6-day course, patient coming in today simply because he is run out of pain medicines at home, endorses the same pain that he had for about 2 months, denies any new falls or trauma, discharge, skin changes or rashes, fevers or other new symptoms.  Did discuss patient case and findings with on-call urology, Dr. Santos, and at this time urology agrees that there has been no new change in the patient's symptoms and he is coming in simply for pain management no indication for repeat imaging or urine studies.  I did review recent ultrasound imaging on 9/5/2024 which showed no significant interval change, no  convincing sonographic evidence for testicular torsion but then again noted large multiseptated extratesticular cystic lesion on the left likely spermatocele.  Did discuss patient case with pharmacy and we reviewed outpatient prescriptions, patient has not filled oxycodone since 9/20/2024 and that was only for 6-day course.  Patient does have follow-up with urology, Dr. Kidd next week.  Again given otherwise no change in his symptoms, benign exam, no indication for emergent repeat ultrasound or urine studies though this was considered.  Will discharge patient with a short course of oxycodone and recommend close follow-up with Dr. Kidd and urology next week.  All of his questions were answered and reasons to return discussed.  Patient felt comfortable this plan was discharged stable condition.      12:45 PM I met with the patient, obtained history, performed an initial exam, and discussed options and plan for diagnostics and treatment here in the ED.   1:03 PM Spoke with pharmacy and reviewed prescription monitoring program. Patient last filled oxycodone 9/20/2024 with 12 tabs for 6 day course.   1:20 PM Spoke with Tom Hancock MD of urology.  1:46 PM Repeat exam benign.  Discussed urology recommendations, discharge, pain management and close follow-up with urology.    Medical Decision Making  Obtained supplemental history:Supplemental history obtained?: Documented in chart  Reviewed external records: External records reviewed?: Documented in chart and Outpatient Record: 09/10/2024 (36 days ago) urology office visit at [facility],  9/25/2024 (21 days ago) [facility] [place] visit  Care impacted by chronic illness:Chronic Pain  Did you consider but not order tests?: Work up considered but not performed and documented in chart, if applicable  Did you interpret images independently?: Independent interpretation of ECG and images noted in  documentation, when applicable.  Consultation discussion with other provider:Did you involve another provider (consultant, MH, pharmacy, etc.)?: I discussed the care with another health care provider, see documentation for details.  Discharge. I prescribed additional prescription strength medication(s) as charted. See documentation for any additional details.    MIPS: Not Applicable        At the conclusion of the encounter I discussed the results of all of the tests and the disposition. The questions were answered and return precautions provided. The patient or family acknowledged understanding and was agreeable with the care plan.         MEDICATIONS GIVEN IN THE EMERGENCY:  Medications   oxyCODONE (ROXICODONE) tablet 5 mg (5 mg Oral $Given 10/16/24 1315)       NEW PRESCRIPTIONS STARTED AT TODAY'S ER VISIT  Discharge Medication List as of 10/16/2024  2:04 PM        START taking these medications    Details   oxyCODONE (ROXICODONE) 5 MG tablet Take 1 tablet (5 mg) by mouth every 6 hours as needed., Disp-15 tablet, R-0, Local Print                  =================================================================    HPI    Patient information was obtained from: Patient    Use of Intrepreter: N/A       Bill Wisdom is a 54 year old male with a past medical history significant for left spermatocele s/p spermatocelectomy, diabetes mellitus type 2, who presents for left testicular pain management. Patient states two months of back and left testicular pain since lifting a heavy create. Patient is requesting pain management specifically for his left testicular pain. Patient denies any changes to testicular pain recently, including severity or location. Patient has been taking ibuprofen and tylenol to manage pain. He most recently took ibuprofen a few hours ago.     Patient notes he was last filled oxycodone 9/20/2024 with 12 tabs for 6 day course. Patient states he ran out of this medication.    Patient denies recent  testicular trauma. Patient denies recent other trauma or falls. Patient denies dysuria, hematuria, urinary retention, urinary frequency, vomiting, or any other symptoms at this time.      Per chart review, 09/10/2024 (36 days ago) urology office visit at [facility] with Dioni Kidd MD for right groin pain and swelling. Prior to this visit, patient seen in the ED on 09/05/2024 (41 days ago) for right groin swelling and pain, and back pain after lifting heavy crates six weeks prior. During urology visit, patient had some recurrent fluid/swelling since last spermatocele, however this became precipitously worse with back injury and now constant, sharp, and irrespective of activity or position. Discussed case with partner Dr. Rosario and radiology who agreed that spinal source more likely at this point and requires investigation. ED precautions extensively discussed, voiced understanding of this and plan.    Per chart review, 9/25/2024 (21 days ago) [facility] [place] visit bakari Veloz, Leonid Mcrae PA-C referred by Dr. Kidd. Discussed recent imaging with patient. Reviewed imaging with patient that does not show any radiographic evidence of acute fracture involving the lumbar spine, a stable grade 1 anterolisthesis of L3 on L4 and mild multilevel degenerative disc and facet disease. Discussed testicular / scrotum doppler and ultrasound showed a large multiseptated extratesticular cystic lesion on the left and is likely a spermatocele originating from the left epididymis. Noted Upcoming abdomen / pelvis CT scan scheduled in near future.        PAST MEDICAL HISTORY:  Past Medical History:   Diagnosis Date    COPD (chronic obstructive pulmonary disease) (H)     Gastro-oesophageal reflux disease     History of tobacco abuse     Obese     CHANDLER (obstructive sleep apnea)     Prediabetes        PAST SURGICAL HISTORY:  Past Surgical History:   Procedure  Laterality Date    HYDROCELECTOMY SCROTAL  2006    INJECT SACROILIAC JOINT Right 9/16/2019    Procedure: Right Sacroiliac Joint Injection;  Surgeon: Snia Laboy MD;  Location: [facility] OR    SPERMATOCELECTOMY  3/20/2012    Procedure:SPERMATOCELECTOMY; Left Spermatocelectomy; Surgeon:DELLA CROW; Location:[facility] OR         CURRENT MEDICATIONS:    Prior to Admission medications    Medication Sig Start Date End Date Taking? Authorizing Provider   acetaminophen (TYLENOL) 500 MG tablet Take 1-2 tablets (500-1,000 mg) by mouth every 6 hours as needed for mild pain or fever 4/9/24   Lorie Zavala APRN CNP   albuterol (PROVENTIL HFA) 108 (90 Base) MCG/ACT inhaler Inhale 2 puffs into the lungs every 6 hours as needed for shortness of breath or wheezing *Pharmacy may dispense brand covered by insurance (Proair, or proventil or ventolin or generic albuterol inhaler) 4/9/24   Lorie aZvala APRN CNP   albuterol (PROVENTIL) (2.5 MG/3ML) 0.083% neb solution Take 1 vial (2.5 mg) by nebulization every 6 hours as needed for shortness of breath or wheezing 4/9/24   Lorie Zavala APRN CNP   allopurinol (ZYLOPRIM) 100 MG tablet Take 1 tablet (100 mg) by mouth daily 1/19/23   Lorie Zavala APRN CNP   Cholecalciferol (VITAMIN D3) 75 MCG (3000 UT) TABS Take 1 tablet by mouth daily 7/9/21   Amalia Hernandez APRN CNP   cyclobenzaprine (FLEXERIL) 10 MG tablet Take 1 tablet (10 mg) by mouth 3 times daily as needed for muscle spasms  Patient not taking: Reported on 9/10/2024 2/14/22   Danica Lee MD   hydrOXYzine HCl (ATARAX) 25 MG tablet Take 1 tablet (25 mg) by mouth 3 times daily as needed for itching, anxiety or other (pain). 9/20/24   Amalia Hernandez APRN CNP   ibuprofen (ADVIL/MOTRIN) 600 MG tablet Take 1 tablet (600 mg) by mouth every 6 hours as needed for moderate pain. 8/29/24   Vignesh Pascal MD   ibuprofen (ADVIL/MOTRIN) 600 MG tablet Take 1 tablet (600 mg) by mouth every 6 hours as  needed for moderate pain (4-6) 23   Lorie Zavala APRN CNP   meloxicam (MOBIC) 15 MG tablet Take 1 tablet (15 mg) by mouth daily for 14 days. 9/10/24 9/24/24  Dioni Kidd MD   Multiple Vitamins-Minerals (MULTIVITAMIN ADULT) CHEW Take 1 chew tab by mouth daily  Patient not taking: Reported on 9/10/2024 5/26/20   Amalia Hernandez APRN CNP   naproxen (NAPROSYN) 500 MG tablet Take 1 tablet (500 mg) by mouth 2 times daily as needed for moderate pain 21   Amalia Hernandez APRN CNP   order for DME Equipment being ordered: Nebulizer and equipment 19   Amalia Hernandez APRN CNP   order for DME Equipment being ordered: Cane 19   Amalia Hernandez APRN CNP   tiZANidine (ZANAFLEX) 4 MG capsule Take 1 capsule (4 mg) by mouth 2 times daily as needed for muscle spasms  Patient not taking: Reported on 9/10/2024 7/9/21   Amalia Hernandez APRN CNP   topiramate (TOPAMAX) 100 MG tablet Take 1 tablet (100 mg) by mouth daily (with dinner)  Patient not taking: Reported on 9/10/2024 12/4/19   Anna Marie Bejarano PA-C   traZODone (DESYREL) 100 MG tablet Take 100 mg by mouth At Bedtime  Patient not taking: Reported on 9/10/2024    Reported, Patient        ALLERGIES:  No Known Allergies    FAMILY HISTORY:  Family History   Problem Relation Age of Onset    C.A.D. Mother         triple bypass    Hypertension Father     Cerebrovascular Disease Father     Alcohol/Drug Sister        SOCIAL HISTORY:   Social History     Socioeconomic History    Marital status: Single   Tobacco Use    Smoking status: Former     Current packs/day: 0.00     Types: Cigarettes     Quit date: 2019     Years since quittin.7    Smokeless tobacco: Never   Substance and Sexual Activity    Alcohol use: No     Comment: quit drinking 2019    Drug use: Not Currently     Types: Marijuana     Comment: Last used 2 days ago    Sexual activity: Not Currently     Partners: Female     Comment: Past history of  "cocaine use   Other Topics Concern    Parent/sibling w/ CABG, MI or angioplasty before 65F 55M? No     Social Determinants of Health     Financial Resource Strain: Not on File (2021)    Received from [facility]    Financial Resource Strain     Financial Resource Strain: 0   Food Insecurity: Not on File (2024)    Received from [facility]    Food Insecurity     Food: 0   Transportation Needs: Not on File (2021)    Received from [facility]    Transportation Needs     Transportation: 0   Physical Activity: Not on File (2021)    Received from [facility]    Physical Activity     Physical Activity: 0   Stress: Not on File (2021)    Received from [facility]    Stress     Stress: 0   Social Connections: Not on File (2024)    Received from [facility]    Social Connections     Connectedness: 0   Interpersonal Safety: Unknown (2024)    Received from [facility]    Humiliation, Afraid, Rape, and Kick questionnaire     Emotionally Abused: No     Physically Abused: No     Sexually Abused: No   Housing Stability: Not on File (2021)    Received from [facility]    Housing Stability     Housin       VITALS:  Patient Vitals for the past 24 hrs:   BP Temp Pulse Resp SpO2 Height Weight   10/16/24 1219 134/69 98  F (36.7  C) 65 18 99 % 1.676 m (5' 6\") 114.8 kg (253 lb)        PHYSICAL EXAM    Constitutional:  Awake, alert, in no apparent distress  HENT:  Normocephalic, Atraumatic. Bilateral external ears normal. Oropharynx moist. Nose normal. Neck- Normal range of motion with no guarding, No midline cervical tenderness, Supple, No stridor.   Eyes:  PERRL, EOMI with no signs of entrapment, Conjunctiva normal, No discharge.   Respiratory:  Normal breath sounds, No respiratory distress, No wheezing.    Cardiovascular:  Normal heart rate, Normal rhythm, No appreciable rubs or gallops.   GI:  Soft, No tenderness, No distension, No palpable masses  : Enlarged and tender left testicle that appears to " be in normal vertical lie, right testicle and normal vertical lie with no tenderness, no urethral discharge, no skin changes.  Musculoskeletal:  Intact distal pulses, No edema. Good range of motion in all major joints. No tenderness to palpation or major deformities noted.  Integument:  Warm, Dry, No erythema, No rash.   Neurologic:  Alert & oriented, Normal motor function, Normal sensory function, No focal deficits noted.   Psychiatric:  Affect normal, Judgment normal, Mood normal.     LAB:  All pertinent labs reviewed and interpreted.       RADIOLOGY:  No orders to display          EKG:      I have independently reviewed and interpreted the EKG(s) documented above.    PROCEDURES:        [employer] [facility] Documentation:       I, Justin Gallagher, am serving as a scribe to document services personally performed by Tommy Orta MD, based on my observation and the provider's statements to me. I, Tommy Orta MD attest that Justin Gallagher is acting in a scribe capacity, has observed my performance of the services and has documented them in accordance with my direction.    Tommy Orta M.D.  Emergency Medicine  [facility] EMERGENCY DEPARTMENT  [address] [place] [id number]  [phone]  Dept: [phone]      Tommy Orta MD  10/16/24 8947

## 2024-10-17 NOTE — PROGRESS NOTES
Remote Note    CC: scrotal pain    HPI:  I spoke to ER physician  Patient is 53 yo male  He saw my colleague Dr Kidd 9/10/2024 about month ago    Reviewed notes  Spermatocelectomy 2006, then repeat spermatocelectomy 2012 with R groin pain and swelling recently seen in ED 9/5. Lifting heavy crates 8/24/24  Has shooting pain in the back and spine with radicular referring down legs to groin. Notes that he had some recurrent fluid/swelling since last spermatocele, however this became precipitously worse with back injury and now constant, sharp, and irrespective of activity or position.     Ultrasound 8/6/24  Large multiseptated extratesticular cystic lesion on the left is  not significantly changed, and is likely a spermatocele originating  from the left epididymis.    He has had this left testicular pain for months now and he comes in after running out of oxycodone  Per discussion with ER doctor, the pain is not different    Labs:  none  Imaging  9/5/2024  IMPRESSION:  1.  No significant interval change. No convincing sonographic evidence  for testicular torsion.  2.  Large multiseptated extratesticular cystic lesion on the left is  not significantly changed, and is likely a spermatocele originating  from the left epididymis.    None  A/P:  53 yo male with chronic testicular pain  I was called via phone to discuss case, to see if any additional workup needed, per discussion this is stable pain for last 2 months similar, he has had multiple scrotal US that shows the same, I discussed that if symptoms different then consider repeat imaging to rule out torsion however if unchanged for months and since he has had multiple US that shows the same finding with no evidence of torsion I think unlikely to be torsion. Has follow-up next week with urology for spermatic cord block    The above treatment consideration and suggestions are based on discussion with the consulting provider. I have not personally examined the patient.  All recommendations should be implemented with consideration of the patients relevant prior history and current clinical status. Please feel free to contact me with any questions about this patient's care    Santi Sanchez MD  Urology

## 2024-11-06 ENCOUNTER — TELEPHONE (OUTPATIENT)
Dept: OPHTHALMOLOGY | Facility: CLINIC | Age: 54
End: 2024-11-06
Payer: COMMERCIAL

## 2024-11-25 ENCOUNTER — PATIENT OUTREACH (OUTPATIENT)
Dept: CARE COORDINATION | Facility: CLINIC | Age: 54
End: 2024-11-25
Payer: COMMERCIAL

## 2024-11-26 ENCOUNTER — TELEPHONE (OUTPATIENT)
Dept: UROLOGY | Facility: CLINIC | Age: 54
End: 2024-11-26
Payer: COMMERCIAL

## 2024-11-26 NOTE — TELEPHONE ENCOUNTER
[facility] Call Center    Phone Message    May a detailed message be left on voicemail: yes     Reason for Call: Other: pt calling and making sure you got all paper work for him to have  2 weeks off, please reach out to pt    Action Taken: Other: urology    Travel Screening: Not Applicable     Date of Service:

## 2024-11-26 NOTE — PROGRESS NOTES
Clinic Care Coordination Contact  Community Health Worker Initial Outreach    CHW Initial Information Gathering:  Referral Source: PCP  Preferred Hospital: [facility]  [phone]  Preferred Urgent Care: Other  Current living arrangement:: Other ([facility] off [address] in [place])  Type of residence:: Homeless  Community Resources: [facility], [facility] Worker (Cadi waiver)  Supplies Currently Used at Home: None  Equipment Currently Used at Home: cane, straight  Informal Support system:: None  No PCP office visit in Past Year: No  Transportation means:: Other (1,000 dollars woth of lyft rides for free a month)  CHW Additional Questions  If ED/Hospital discharge, follow-up appointment scheduled as recommended?: N/A  Medication changes made following ED/Hospital discharge?: N/A  MyChart active?: Yes  Patient sent Social Drivers of Health questionnaire?: No    CHW spoke with patient and patient indicated that he would like to speak to someone to gain information on possibly having surgery due to some fluid retention that he is having. Also the patient is currently staying at [facility] off of [address] in [place] and would like to talk about housing options.    Patient accepts CC: Yes. Patient scheduled for assessment with [facility] on 12/02/24 at 11:00 AM. Patient noted desire to discuss getting advice on possibly having surgery due to fluid retention and housing concerns.    Latia HENDERSON, Community Health Worker  Clinic Care Coordination  [facility]   Clinic #: [phone]  Direct #: [phone]

## 2024-11-27 NOTE — TELEPHONE ENCOUNTER
Patient will contact employer to have any paperwork needed, faxed to the clinic.  Tatiana Almeida RN

## 2024-12-02 ENCOUNTER — PATIENT OUTREACH (OUTPATIENT)
Dept: INTERNAL MEDICINE | Facility: CLINIC | Age: 54
End: 2024-12-02
Payer: COMMERCIAL

## 2024-12-02 ASSESSMENT — ACTIVITIES OF DAILY LIVING (ADL): DEPENDENT_IADLS:: INDEPENDENT

## 2024-12-02 NOTE — PROGRESS NOTES
"Clinic Care Coordination Contact  Clinic Care Coordination Contact  OUTREACH    Referral Information:  Referral Source: PCP    Referral from Urology MD: \"Discussed that prior to performing spermatoceleectomy/epididymectomy recommend a PCP visit as well as a social work visit to help optimize postoperative outcomes.  Will plan to book surgery once he has reliable plans in these to regards.  He understands the plan risk benefits precautions and agrees to proceed as such\"      Chief Complaint   Patient presents with    Clinic Care Coordination - Initial        Universal Utilization: 85.5% Admission or ED Risk        Utilization      No Show Count (past year)  9             ED Visits  3             Hospital Admissions  0                    Current as of: 12/1/2024  7:32 PM                Clinical Concerns:  Current Medical Concerns:    RN called and spoke with patient. Pt stated that actually, he is weighing more towards not doing the surgery as it seems like a lot of \"risky business\". He states he is uncomfortable sometimes, but may be willing to cope with it. RN did share that if the has further questions it would be best to talk with urology versus primary care. Patient agrees with this plan.     On top of his housing concerns within a couple- working with [facility] and [facility]     Current Behavioral Concerns: RN did ask patient about housing concerns that were also in the referral reason- pt states that he is on top of this and is working through his concerns with a couple of different people, [facility] and [facility]. He states that he does not need any further support with this either and it should be handled next week. He will call with any concerns or future needs for care coordination.       Education Provided to patient: See as above       Health Maintenance Reviewed:    Clinical Pathway: None    Functional Status:  Dependent ADLs:: Independent  Dependent IADLs:: Independent  Bed or wheelchair confined:: No  Mobility Status: " Independent  Fallen 2 or more times in the past year?: No  Any fall with injury in the past year?: No    Living Situation:  Type of residence:: Homeless    Lifestyle & Psychosocial Needs:    Social Drivers of Health     Food Insecurity: Not on File (9/26/2024)    Received from [facility]    Food Insecurity     Food: 0   Depression: At risk (9/20/2024)    PHQ-2     PHQ-2 Score: 4   Housing Stability: High Risk (12/2/2024)    Housing Stability     Do you have housing? : No     Are you worried about losing your housing?: Not on file   Tobacco Use: Medium Risk (9/25/2024)    Patient History     Smoking Tobacco Use: Former     Smokeless Tobacco Use: Never     Passive Exposure: Not on file   Financial Resource Strain: Not on File (8/9/2021)    Received from [facility]    Financial Resource Strain     Financial Resource Strain: 0   Alcohol Use: Not on file   Transportation Needs: Not on File (8/9/2021)    Received from [facility]    Transportation Needs     Transportation: 0   Physical Activity: Not on File (8/9/2021)    Received from [facility]    Physical Activity     Physical Activity: 0   Interpersonal Safety: Unknown (7/24/2024)    Received from [facility]    Humiliation, Afraid, Rape, and Kick questionnaire     Fear of Current or Ex-Partner: Not on file     Emotionally Abused: No     Physically Abused: No     Sexually Abused: No   Stress: Not on File (8/9/2021)    Received from [facility]    Stress     Stress: 0   Social Connections: Not on File (9/20/2024)    Received from [facility]    Social Connections     Connectedness: 0   Health Literacy: Not on file        Sabianist or spiritual beliefs that impact treatment:: No  Mental health management concern (GOAL):: No  Chemical Dependency Status: No Current Concerns  Informal Support system:: None          Resources and Interventions:     Community Resources: County Programs, County Worker  Supplies Currently Used at Home: None  Equipment Currently Used at Home:  cane, straight     Advance Care Plan/Directive  Advanced Care Plans/Directives on file:: No  Discussed with patient/caregiver:: Declined Further Information    Referrals Placed: None    Patient/Caregiver understanding: Patient states understanding. Patient has no further questions or concerns regarding above assessment. Patient is aware to call the clinic and reach out to the care team with any further questions.          Future Appointments                In 2 days Judy Shipley PT [facility],     In 2 weeks Amalia Hernandez APRN CNP [facility]            Plan: RN will close program, resources given with no ongoing needs.     WILMER VALDERRAMA RN on 12/2/2024 at 12:05 PM

## 2024-12-11 ENCOUNTER — TELEPHONE (OUTPATIENT)
Dept: GASTROENTEROLOGY | Facility: CLINIC | Age: 54
End: 2024-12-11
Payer: COMMERCIAL

## 2024-12-11 NOTE — TELEPHONE ENCOUNTER
"Endoscopy Scheduling Screen    Have you had any respiratory illness or flu-like symptoms in the last 10 days?  No    What is your communication preference for Instructions and/or Bowel Prep?   MyChart    What insurance is in the chart?  Other:  [employer] [place]    Ordering/Referring Provider:   DYLON PEACOCK        (If ordering provider performs procedure, schedule with ordering provider unless otherwise instructed. )    BMI: Estimated body mass index is 40.84 kg/m  as calculated from the following:    Height as of 10/16/24: 1.676 m (5' 6\").    Weight as of 10/16/24: 114.8 kg (253 lb).     Sedation Ordered  moderate sedation.   If patient BMI > 50 do not schedule in [facility].    If patient BMI > 45 do not schedule at [facility].    Are you taking methadone or Suboxone?  NO, No RN review required.    Have you been diagnosed and are being treated for severe PTSD or severe anxiety?  YES, Schedule with [facility]. (If patient has additional questions please InBasket to RN pool for .)    Are you taking any prescription medications for pain 3 or more times per week?   NO, No RN review required.    Do you have a history of malignant hyperthermia?  No    (Females) Are you currently pregnant?   No     Have you been diagnosed or told you have pulmonary hypertension?   No    Do you have an LVAD?  No    Have you been told you have moderate to severe sleep apnea?  Yes. Do you use a CPAP? No. (RN Review required for scheduling unless scheduling in Hospital.)     Have you been told you have COPD, asthma, or any other lung disease?  Yes     What breathing problems do you have?  COPD     Do you use home oxygen?  No    Have your breathing problems required an ED visit or hospitalization in the last year?  No.    Do you have any heart conditions?  No     Have you ever had or are you waiting for an organ transplant?  No. Continue scheduling, no site restrictions.    Have you had a stroke or transient ischemic attack (TIA aka \"mini stroke\" in the " "last 6 months?   No    Have you been diagnosed with or been told you have cirrhosis of the liver?   No.    Are you currently on dialysis?   No    Do you need assistance transferring?   No    BMI: Estimated body mass index is 40.84 kg/m  as calculated from the following:    Height as of 10/16/24: 1.676 m (5' 6\").    Weight as of 10/16/24: 114.8 kg (253 lb).     Is patients BMI > 40 and scheduling location [facility]?  Yes (If MAC sedation is ordered, schedule PAC eval)    Do you take an injectable or oral medication for weight loss or diabetes (excluding insulin)?  No    Do you take the medication Naltrexone?  No    Do you take blood thinners?  No       Prep   Are you currently on dialysis or do you have chronic kidney disease?  No    Do you have a diagnosis of diabetes?  Yes (Golytely Prep)    Do you have a diagnosis of cystic fibrosis (CF)?  No    On a regular basis do you go 3 -5 days between bowel movements?  No    BMI > 40?  Yes (Extended Prep)    Preferred Pharmacy:        [id number] - [place], [place] - [address] [place] [address]  Phone: [phone] Fax: [phone]      Final Scheduling Details     Procedure scheduled  Colonoscopy    Surgeon:  BRENDA     Date of procedure:  6/12/25     Pre-OP / PAC:   Yes - PAC clinic evaluation scheduled.    Location  [facility] - Per exclusion criteria.    Sedation   MAC/Deep Sedation - Per exclusion criteria.      Patient Reminders:   You will receive a call from a Nurse to review instructions and health history.  This assessment must be completed prior to your procedure.  Failure to complete the Nurse assessment may result in the procedure being cancelled.      On the day of your procedure, please designate an adult(s) who can drive you home stay with you for the next 24 hours. The medicines used in the exam will make you sleepy. You will not be able to drive.      You cannot take public transportation, ride share services, or non-medical taxi " service without a responsible caregiver.  Medical transport services are allowed with the requirement that a responsible caregiver will receive you at your destination.  We require that drivers and caregivers are confirmed prior to your procedure.

## 2024-12-27 PROBLEM — N50.812 PAIN IN LEFT TESTICLE: Status: ACTIVE | Noted: 2024-12-27

## 2025-01-11 ENCOUNTER — HEALTH MAINTENANCE LETTER (OUTPATIENT)
Age: 55
End: 2025-01-11

## 2025-01-13 ENCOUNTER — TELEPHONE (OUTPATIENT)
Dept: UROLOGY | Facility: CLINIC | Age: 55
End: 2025-01-13
Payer: COMMERCIAL

## 2025-01-13 NOTE — LETTER
1/13/2025            To Whom It May Concern:    Bill Wisdom has been under the care at [facility] with ongoing care.  Please excuse patient from work on 11/22/24 to 12/6/24.  We can be contacted at [phone] with any questions or concerns.  Thank you.      Sincerely,         Dr. Kidd/Trini Sotelo, CMA

## 2025-01-13 NOTE — TELEPHONE ENCOUNTER
[facility] Call Center    Phone Message    May a detailed message be left on voicemail: yes     Reason for Call: Patient called in and requested a letter to be off work for 2 weeks. States this was for his appointments back in November 2024 for work comp. Please review and call patient to further discuss. Patient is asking for a call ASAP.     Action Taken: Other: Urology    Travel Screening: Not Applicable

## 2025-01-21 ENCOUNTER — THERAPY VISIT (OUTPATIENT)
Dept: PHYSICAL THERAPY | Facility: CLINIC | Age: 55
End: 2025-01-21
Payer: OTHER MISCELLANEOUS

## 2025-01-21 DIAGNOSIS — N50.812 PAIN IN LEFT TESTICLE: Primary | ICD-10-CM

## 2025-01-21 PROCEDURE — 97110 THERAPEUTIC EXERCISES: CPT | Mod: GP

## 2025-01-21 PROCEDURE — 97530 THERAPEUTIC ACTIVITIES: CPT | Mod: GP

## 2025-01-21 NOTE — PROGRESS NOTES
Biofeedback Assessment:   Supine Baseline:      Seated Quick Flicks:       Seated Endurance:      Final Baseline:

## 2025-02-04 ENCOUNTER — THERAPY VISIT (OUTPATIENT)
Dept: PHYSICAL THERAPY | Facility: CLINIC | Age: 55
End: 2025-02-04
Payer: OTHER MISCELLANEOUS

## 2025-02-04 DIAGNOSIS — N50.812 PAIN IN LEFT TESTICLE: Primary | ICD-10-CM

## 2025-02-04 PROCEDURE — 97530 THERAPEUTIC ACTIVITIES: CPT | Mod: GP

## 2025-02-04 PROCEDURE — 97110 THERAPEUTIC EXERCISES: CPT | Mod: GP

## 2025-02-27 NOTE — TELEPHONE ENCOUNTER
FUTURE VISIT INFORMATION      SURGERY INFORMATION:  Date: 6/12/25  Location:  [facility]  Surgeon:  Ammon Morales MD   Anesthesia Type:  mac  Procedure: Colonoscopy     RECORDS REQUESTED FROM:       Primary Care Provider: Amalia Hernandez APRN [facility]    Pertinent Medical History: jett    Most recent EKG+ Tracing: 3/2/20

## 2025-04-20 ENCOUNTER — HEALTH MAINTENANCE LETTER (OUTPATIENT)
Age: 55
End: 2025-04-20

## 2025-05-27 ENCOUNTER — PRE VISIT (OUTPATIENT)
Dept: SURGERY | Facility: CLINIC | Age: 55
End: 2025-05-27

## (undated) DEVICE — PREP CHLORAPREP W/ORANGE TINT 10.5ML 260715

## (undated) DEVICE — TRAY PAIN INJECTION 97A 640

## (undated) DEVICE — GLOVE PROTEXIS POWDER FREE SMT 8.0  2D72PT80X

## (undated) DEVICE — NDL SPINAL 22GA 5" QUINCKE 405148

## (undated) RX ORDER — DOBUTAMINE HYDROCHLORIDE 200 MG/100ML
INJECTION INTRAVENOUS
Status: DISPENSED
Start: 2019-11-11

## (undated) RX ORDER — METOPROLOL TARTRATE 1 MG/ML
INJECTION, SOLUTION INTRAVENOUS
Status: DISPENSED
Start: 2019-11-11

## (undated) RX ORDER — ATROPINE SULFATE 0.4 MG/ML
AMPUL (ML) INJECTION
Status: DISPENSED
Start: 2019-11-11